# Patient Record
Sex: MALE | Race: BLACK OR AFRICAN AMERICAN | NOT HISPANIC OR LATINO | URBAN - METROPOLITAN AREA
[De-identification: names, ages, dates, MRNs, and addresses within clinical notes are randomized per-mention and may not be internally consistent; named-entity substitution may affect disease eponyms.]

---

## 2017-01-24 ENCOUNTER — INPATIENT (INPATIENT)
Facility: HOSPITAL | Age: 57
LOS: 2 days | Discharge: HOME | End: 2017-01-27
Attending: THORACIC SURGERY (CARDIOTHORACIC VASCULAR SURGERY) | Admitting: THORACIC SURGERY (CARDIOTHORACIC VASCULAR SURGERY)
Payer: MEDICAID

## 2017-01-24 VITALS
RESPIRATION RATE: 20 BRPM | HEIGHT: 65 IN | DIASTOLIC BLOOD PRESSURE: 84 MMHG | OXYGEN SATURATION: 99 % | HEART RATE: 100 BPM | SYSTOLIC BLOOD PRESSURE: 151 MMHG | TEMPERATURE: 98 F | WEIGHT: 187.83 LBS

## 2017-01-24 DIAGNOSIS — J39.8 OTHER SPECIFIED DISEASES OF UPPER RESPIRATORY TRACT: ICD-10-CM

## 2017-01-24 PROCEDURE — 99221 1ST HOSP IP/OBS SF/LOW 40: CPT

## 2017-01-24 RX ORDER — ATORVASTATIN CALCIUM 80 MG/1
80 TABLET, FILM COATED ORAL AT BEDTIME
Qty: 0 | Refills: 0 | Status: DISCONTINUED | OUTPATIENT
Start: 2017-01-24 | End: 2017-01-27

## 2017-01-24 RX ORDER — FAMOTIDINE 10 MG/ML
20 INJECTION INTRAVENOUS DAILY
Qty: 0 | Refills: 0 | Status: DISCONTINUED | OUTPATIENT
Start: 2017-01-24 | End: 2017-01-27

## 2017-01-24 RX ORDER — BUDESONIDE AND FORMOTEROL FUMARATE DIHYDRATE 160; 4.5 UG/1; UG/1
2 AEROSOL RESPIRATORY (INHALATION)
Qty: 0 | Refills: 0 | Status: DISCONTINUED | OUTPATIENT
Start: 2017-01-24 | End: 2017-01-27

## 2017-01-24 RX ORDER — GABAPENTIN 400 MG/1
300 CAPSULE ORAL EVERY 8 HOURS
Qty: 0 | Refills: 0 | Status: DISCONTINUED | OUTPATIENT
Start: 2017-01-24 | End: 2017-01-27

## 2017-01-24 RX ORDER — IPRATROPIUM/ALBUTEROL SULFATE 18-103MCG
3 AEROSOL WITH ADAPTER (GRAM) INHALATION EVERY 6 HOURS
Qty: 0 | Refills: 0 | Status: DISCONTINUED | OUTPATIENT
Start: 2017-01-24 | End: 2017-01-25

## 2017-01-24 RX ORDER — HEPARIN SODIUM 5000 [USP'U]/ML
5000 INJECTION INTRAVENOUS; SUBCUTANEOUS EVERY 8 HOURS
Qty: 0 | Refills: 0 | Status: DISCONTINUED | OUTPATIENT
Start: 2017-01-24 | End: 2017-01-27

## 2017-01-24 RX ORDER — QUETIAPINE FUMARATE 200 MG/1
200 TABLET, FILM COATED ORAL
Qty: 0 | Refills: 0 | Status: DISCONTINUED | OUTPATIENT
Start: 2017-01-24 | End: 2017-01-27

## 2017-01-24 RX ORDER — LOSARTAN POTASSIUM 100 MG/1
50 TABLET, FILM COATED ORAL DAILY
Qty: 0 | Refills: 0 | Status: DISCONTINUED | OUTPATIENT
Start: 2017-01-24 | End: 2017-01-27

## 2017-01-24 NOTE — H&P ADULT. - PSH
History of appendectomy    S/P diskectomy  5/7/12 anterior cervical diskectomy and fusion C 3 to C 6 with posterior laminectomy and fusion C 3 to C 6

## 2017-01-24 NOTE — H&P ADULT. - PMH
AA (alcohol abuse)    Detached retina, right  Right eye blind  DM (diabetes mellitus)    DM (diabetes mellitus), type 2    H/O: HTN (hypertension)    HLD (hyperlipidemia)    HTN (hypertension)    Tracheal stenosis AA (alcohol abuse)    Detached retina, right  Right eye blind  DM (diabetes mellitus)    DM (diabetes mellitus), type 2    H/O: HTN (hypertension)    HLD (hyperlipidemia)    HTN (hypertension)    Seizure  secondary to alcohol abuse  Tracheal stenosis

## 2017-01-24 NOTE — H&P ADULT. - HISTORY OF PRESENT ILLNESS
56 year old male transferred from [facility] with tracheal stenosis.  Patient presented to Emergency Room on 1/20/17 with complaints of shortness of breath and diaphoresis over the past three weeks.   Patient was admitted on 12/26 with similar complaints along with chest pain, discharged on 12/28 with inhaled and po steroids, neb treatments and po abx.  He has returned to the ED 6 times with continued sob, each time patient was using inhaled albuterol with poor symptom control.  Per patient, he was in a coma at [facility] for approximately 90 days until late November/ early December and he suddenly awoke.  Since then patient has had this new onset copd and chest tightness.  Patient quit tobacco use 1.5 weeks ago.  CT of neck showing thickening through the glottis, narrowing of the airway through the larynx.  Patient scoped by ENT 1/24 and found to have stenosis of trachea.  Patient seen by GI for rectal bleeding, possible hemorrhoids recommends miralax daily and outpatient follow up.

## 2017-01-24 NOTE — PATIENT PROFILE ADULT. - VISION (WITH CORRECTIVE LENSES IF THE PATIENT USUALLY WEARS THEM):
Normal vision: sees adequately in most situations; can see medication labels, newsprint/blind in rt eye

## 2017-01-25 DIAGNOSIS — Z98.890 OTHER SPECIFIED POSTPROCEDURAL STATES: Chronic | ICD-10-CM

## 2017-01-25 DIAGNOSIS — E11.9 TYPE 2 DIABETES MELLITUS WITHOUT COMPLICATIONS: ICD-10-CM

## 2017-01-25 DIAGNOSIS — J39.8 OTHER SPECIFIED DISEASES OF UPPER RESPIRATORY TRACT: ICD-10-CM

## 2017-01-25 LAB
APTT BLD: 25.5 SEC — LOW (ref 27.5–37.4)
BLD GP AB SCN SERPL QL: NEGATIVE — SIGNIFICANT CHANGE UP
BUN SERPL-MCNC: 22 MG/DL — SIGNIFICANT CHANGE UP (ref 7–23)
CALCIUM SERPL-MCNC: 9.7 MG/DL — SIGNIFICANT CHANGE UP (ref 8.4–10.5)
CHLORIDE SERPL-SCNC: 94 MMOL/L — LOW (ref 98–107)
CO2 SERPL-SCNC: 29 MMOL/L — SIGNIFICANT CHANGE UP (ref 22–31)
CREAT SERPL-MCNC: 0.94 MG/DL — SIGNIFICANT CHANGE UP (ref 0.5–1.3)
GLUCOSE SERPL-MCNC: 386 MG/DL — HIGH (ref 70–99)
HBA1C BLD-MCNC: 6.3 % — HIGH (ref 4–5.6)
HCT VFR BLD CALC: 40.2 % — SIGNIFICANT CHANGE UP (ref 39–50)
HGB BLD-MCNC: 13.1 G/DL — SIGNIFICANT CHANGE UP (ref 13–17)
INR BLD: 1.03 — SIGNIFICANT CHANGE UP (ref 0.87–1.18)
MCHC RBC-ENTMCNC: 29.6 PG — SIGNIFICANT CHANGE UP (ref 27–34)
MCHC RBC-ENTMCNC: 32.6 % — SIGNIFICANT CHANGE UP (ref 32–36)
MCV RBC AUTO: 91 FL — SIGNIFICANT CHANGE UP (ref 80–100)
PLATELET # BLD AUTO: 346 K/UL — SIGNIFICANT CHANGE UP (ref 150–400)
PMV BLD: 10.2 FL — SIGNIFICANT CHANGE UP (ref 7–13)
POTASSIUM SERPL-MCNC: 4.2 MMOL/L — SIGNIFICANT CHANGE UP (ref 3.5–5.3)
POTASSIUM SERPL-SCNC: 4.2 MMOL/L — SIGNIFICANT CHANGE UP (ref 3.5–5.3)
PROTHROM AB SERPL-ACNC: 11.8 SEC — SIGNIFICANT CHANGE UP (ref 10–13.1)
RBC # BLD: 4.42 M/UL — SIGNIFICANT CHANGE UP (ref 4.2–5.8)
RBC # FLD: 16.2 % — HIGH (ref 10.3–14.5)
RH IG SCN BLD-IMP: POSITIVE — SIGNIFICANT CHANGE UP
SODIUM SERPL-SCNC: 133 MMOL/L — LOW (ref 135–145)
WBC # BLD: 16.53 K/UL — HIGH (ref 3.8–10.5)
WBC # FLD AUTO: 16.53 K/UL — HIGH (ref 3.8–10.5)

## 2017-01-25 PROCEDURE — 70490 CT SOFT TISSUE NECK W/O DYE: CPT | Mod: 26

## 2017-01-25 PROCEDURE — 71010: CPT | Mod: 26

## 2017-01-25 PROCEDURE — 71010: CPT | Mod: 26,77

## 2017-01-25 PROCEDURE — 99223 1ST HOSP IP/OBS HIGH 75: CPT

## 2017-01-25 PROCEDURE — 71250 CT THORAX DX C-: CPT | Mod: 26

## 2017-01-25 PROCEDURE — 93306 TTE W/DOPPLER COMPLETE: CPT | Mod: 26

## 2017-01-25 RX ORDER — GLUCAGON INJECTION, SOLUTION 0.5 MG/.1ML
1 INJECTION, SOLUTION SUBCUTANEOUS ONCE
Qty: 0 | Refills: 0 | Status: DISCONTINUED | OUTPATIENT
Start: 2017-01-25 | End: 2017-01-27

## 2017-01-25 RX ORDER — DEXTROSE 50 % IN WATER 50 %
12.5 SYRINGE (ML) INTRAVENOUS ONCE
Qty: 0 | Refills: 0 | Status: DISCONTINUED | OUTPATIENT
Start: 2017-01-25 | End: 2017-01-27

## 2017-01-25 RX ORDER — INSULIN LISPRO 100/ML
VIAL (ML) SUBCUTANEOUS
Qty: 0 | Refills: 0 | Status: DISCONTINUED | OUTPATIENT
Start: 2017-01-25 | End: 2017-01-27

## 2017-01-25 RX ORDER — POLYETHYLENE GLYCOL 3350 17 G/17G
17 POWDER, FOR SOLUTION ORAL DAILY
Qty: 0 | Refills: 0 | Status: DISCONTINUED | OUTPATIENT
Start: 2017-01-25 | End: 2017-01-27

## 2017-01-25 RX ORDER — DEXTROSE 50 % IN WATER 50 %
1 SYRINGE (ML) INTRAVENOUS ONCE
Qty: 0 | Refills: 0 | Status: DISCONTINUED | OUTPATIENT
Start: 2017-01-25 | End: 2017-01-27

## 2017-01-25 RX ORDER — FOLIC ACID 0.8 MG
1 TABLET ORAL DAILY
Qty: 0 | Refills: 0 | Status: DISCONTINUED | OUTPATIENT
Start: 2017-01-25 | End: 2017-01-27

## 2017-01-25 RX ORDER — INSULIN LISPRO 100/ML
4 VIAL (ML) SUBCUTANEOUS
Qty: 0 | Refills: 0 | Status: DISCONTINUED | OUTPATIENT
Start: 2017-01-25 | End: 2017-01-27

## 2017-01-25 RX ORDER — INSULIN LISPRO 100/ML
VIAL (ML) SUBCUTANEOUS AT BEDTIME
Qty: 0 | Refills: 0 | Status: DISCONTINUED | OUTPATIENT
Start: 2017-01-25 | End: 2017-01-27

## 2017-01-25 RX ORDER — ACETAMINOPHEN 500 MG
650 TABLET ORAL EVERY 6 HOURS
Qty: 0 | Refills: 0 | Status: DISCONTINUED | OUTPATIENT
Start: 2017-01-25 | End: 2017-01-27

## 2017-01-25 RX ORDER — NIFEDIPINE 30 MG
60 TABLET, EXTENDED RELEASE 24 HR ORAL DAILY
Qty: 0 | Refills: 0 | Status: DISCONTINUED | OUTPATIENT
Start: 2017-01-25 | End: 2017-01-25

## 2017-01-25 RX ORDER — SODIUM CHLORIDE 9 MG/ML
1000 INJECTION, SOLUTION INTRAVENOUS
Qty: 0 | Refills: 0 | Status: DISCONTINUED | OUTPATIENT
Start: 2017-01-25 | End: 2017-01-27

## 2017-01-25 RX ORDER — DEXTROSE 50 % IN WATER 50 %
25 SYRINGE (ML) INTRAVENOUS ONCE
Qty: 0 | Refills: 0 | Status: DISCONTINUED | OUTPATIENT
Start: 2017-01-25 | End: 2017-01-27

## 2017-01-25 RX ORDER — SENNA PLUS 8.6 MG/1
2 TABLET ORAL AT BEDTIME
Qty: 0 | Refills: 0 | Status: DISCONTINUED | OUTPATIENT
Start: 2017-01-25 | End: 2017-01-27

## 2017-01-25 RX ORDER — DOCUSATE SODIUM 100 MG
100 CAPSULE ORAL
Qty: 0 | Refills: 0 | Status: DISCONTINUED | OUTPATIENT
Start: 2017-01-25 | End: 2017-01-27

## 2017-01-25 RX ORDER — INSULIN GLARGINE 100 [IU]/ML
16 INJECTION, SOLUTION SUBCUTANEOUS AT BEDTIME
Qty: 0 | Refills: 0 | Status: DISCONTINUED | OUTPATIENT
Start: 2017-01-25 | End: 2017-01-27

## 2017-01-25 RX ORDER — NIFEDIPINE 30 MG
60 TABLET, EXTENDED RELEASE 24 HR ORAL DAILY
Qty: 0 | Refills: 0 | Status: DISCONTINUED | OUTPATIENT
Start: 2017-01-25 | End: 2017-01-27

## 2017-01-25 RX ORDER — INSULIN LISPRO 100/ML
VIAL (ML) SUBCUTANEOUS
Qty: 0 | Refills: 0 | Status: DISCONTINUED | OUTPATIENT
Start: 2017-01-25 | End: 2017-01-25

## 2017-01-25 RX ORDER — AMLODIPINE BESYLATE 2.5 MG/1
5 TABLET ORAL DAILY
Qty: 0 | Refills: 0 | Status: DISCONTINUED | OUTPATIENT
Start: 2017-01-25 | End: 2017-01-25

## 2017-01-25 RX ORDER — THIAMINE MONONITRATE (VIT B1) 100 MG
100 TABLET ORAL DAILY
Qty: 0 | Refills: 0 | Status: DISCONTINUED | OUTPATIENT
Start: 2017-01-25 | End: 2017-01-27

## 2017-01-25 RX ADMIN — POLYETHYLENE GLYCOL 3350 17 GRAM(S): 17 POWDER, FOR SOLUTION ORAL at 11:22

## 2017-01-25 RX ADMIN — HEPARIN SODIUM 5000 UNIT(S): 5000 INJECTION INTRAVENOUS; SUBCUTANEOUS at 21:09

## 2017-01-25 RX ADMIN — Medication: at 12:47

## 2017-01-25 RX ADMIN — FAMOTIDINE 20 MILLIGRAM(S): 10 INJECTION INTRAVENOUS at 11:23

## 2017-01-25 RX ADMIN — BUDESONIDE AND FORMOTEROL FUMARATE DIHYDRATE 2 PUFF(S): 160; 4.5 AEROSOL RESPIRATORY (INHALATION) at 23:32

## 2017-01-25 RX ADMIN — Medication 650 MILLIGRAM(S): at 14:56

## 2017-01-25 RX ADMIN — Medication: at 18:00

## 2017-01-25 RX ADMIN — Medication 100 MILLIGRAM(S): at 17:00

## 2017-01-25 RX ADMIN — Medication 60 MILLIGRAM(S): at 01:16

## 2017-01-25 RX ADMIN — SENNA PLUS 2 TABLET(S): 8.6 TABLET ORAL at 21:09

## 2017-01-25 RX ADMIN — QUETIAPINE FUMARATE 200 MILLIGRAM(S): 200 TABLET, FILM COATED ORAL at 17:14

## 2017-01-25 RX ADMIN — Medication 60 MILLIGRAM(S): at 18:41

## 2017-01-25 RX ADMIN — Medication 100 MILLIGRAM(S): at 11:22

## 2017-01-25 RX ADMIN — Medication 4: at 23:33

## 2017-01-25 RX ADMIN — Medication 60 MILLIGRAM(S): at 21:09

## 2017-01-25 RX ADMIN — INSULIN GLARGINE 16 UNIT(S): 100 INJECTION, SOLUTION SUBCUTANEOUS at 23:33

## 2017-01-25 RX ADMIN — BUDESONIDE AND FORMOTEROL FUMARATE DIHYDRATE 2 PUFF(S): 160; 4.5 AEROSOL RESPIRATORY (INHALATION) at 09:48

## 2017-01-25 RX ADMIN — Medication 100 MILLIGRAM(S): at 06:06

## 2017-01-25 RX ADMIN — Medication: at 08:55

## 2017-01-25 RX ADMIN — HEPARIN SODIUM 5000 UNIT(S): 5000 INJECTION INTRAVENOUS; SUBCUTANEOUS at 06:06

## 2017-01-25 RX ADMIN — GABAPENTIN 300 MILLIGRAM(S): 400 CAPSULE ORAL at 21:10

## 2017-01-25 RX ADMIN — AMLODIPINE BESYLATE 5 MILLIGRAM(S): 2.5 TABLET ORAL at 11:22

## 2017-01-25 RX ADMIN — GABAPENTIN 300 MILLIGRAM(S): 400 CAPSULE ORAL at 06:06

## 2017-01-25 RX ADMIN — QUETIAPINE FUMARATE 200 MILLIGRAM(S): 200 TABLET, FILM COATED ORAL at 06:06

## 2017-01-25 RX ADMIN — Medication 1 MILLIGRAM(S): at 11:22

## 2017-01-25 RX ADMIN — GABAPENTIN 300 MILLIGRAM(S): 400 CAPSULE ORAL at 14:49

## 2017-01-25 RX ADMIN — HEPARIN SODIUM 5000 UNIT(S): 5000 INJECTION INTRAVENOUS; SUBCUTANEOUS at 14:49

## 2017-01-25 RX ADMIN — Medication 3 MILLILITER(S): at 04:59

## 2017-01-25 RX ADMIN — ATORVASTATIN CALCIUM 80 MILLIGRAM(S): 80 TABLET, FILM COATED ORAL at 21:09

## 2017-01-25 RX ADMIN — Medication 4 UNIT(S): at 18:00

## 2017-01-25 RX ADMIN — Medication 650 MILLIGRAM(S): at 15:30

## 2017-01-25 RX ADMIN — Medication 60 MILLIGRAM(S): at 14:48

## 2017-01-25 RX ADMIN — LOSARTAN POTASSIUM 50 MILLIGRAM(S): 100 TABLET, FILM COATED ORAL at 06:06

## 2017-01-26 ENCOUNTER — APPOINTMENT (OUTPATIENT)
Dept: THORACIC SURGERY | Facility: HOSPITAL | Age: 57
End: 2017-01-26

## 2017-01-26 LAB
CHOLEST SERPL-MCNC: 160 MG/DL — SIGNIFICANT CHANGE UP (ref 120–199)
HBA1C BLD-MCNC: 6.6 % — HIGH (ref 4–5.6)
HDLC SERPL-MCNC: 73 MG/DL — HIGH (ref 35–55)
HIV1 AG SER QL: SIGNIFICANT CHANGE UP
HIV1+2 AB SPEC QL: SIGNIFICANT CHANGE UP
LIPID PNL WITH DIRECT LDL SERPL: 77 MG/DL — SIGNIFICANT CHANGE UP
RH IG SCN BLD-IMP: POSITIVE — SIGNIFICANT CHANGE UP
TRIGL SERPL-MCNC: 75 MG/DL — SIGNIFICANT CHANGE UP (ref 10–149)

## 2017-01-26 PROCEDURE — 99232 SBSQ HOSP IP/OBS MODERATE 35: CPT

## 2017-01-26 PROCEDURE — 71010: CPT | Mod: 26

## 2017-01-26 RX ORDER — ONDANSETRON 8 MG/1
4 TABLET, FILM COATED ORAL ONCE
Qty: 0 | Refills: 0 | Status: DISCONTINUED | OUTPATIENT
Start: 2017-01-26 | End: 2017-01-26

## 2017-01-26 RX ORDER — SODIUM CHLORIDE 9 MG/ML
1000 INJECTION, SOLUTION INTRAVENOUS
Qty: 0 | Refills: 0 | Status: DISCONTINUED | OUTPATIENT
Start: 2017-01-26 | End: 2017-01-26

## 2017-01-26 RX ORDER — FENTANYL CITRATE 50 UG/ML
25 INJECTION INTRAVENOUS
Qty: 0 | Refills: 0 | Status: DISCONTINUED | OUTPATIENT
Start: 2017-01-26 | End: 2017-01-26

## 2017-01-26 RX ORDER — BENZOCAINE AND MENTHOL 5; 1 G/100ML; G/100ML
1 LIQUID ORAL
Qty: 0 | Refills: 0 | Status: DISCONTINUED | OUTPATIENT
Start: 2017-01-26 | End: 2017-01-27

## 2017-01-26 RX ADMIN — FAMOTIDINE 20 MILLIGRAM(S): 10 INJECTION INTRAVENOUS at 18:31

## 2017-01-26 RX ADMIN — Medication 60 MILLIGRAM(S): at 21:06

## 2017-01-26 RX ADMIN — GABAPENTIN 300 MILLIGRAM(S): 400 CAPSULE ORAL at 05:43

## 2017-01-26 RX ADMIN — Medication 100 MILLIGRAM(S): at 05:43

## 2017-01-26 RX ADMIN — QUETIAPINE FUMARATE 200 MILLIGRAM(S): 200 TABLET, FILM COATED ORAL at 05:43

## 2017-01-26 RX ADMIN — HEPARIN SODIUM 5000 UNIT(S): 5000 INJECTION INTRAVENOUS; SUBCUTANEOUS at 05:43

## 2017-01-26 RX ADMIN — Medication 6: at 08:10

## 2017-01-26 RX ADMIN — Medication 60 MILLIGRAM(S): at 05:43

## 2017-01-26 RX ADMIN — SENNA PLUS 2 TABLET(S): 8.6 TABLET ORAL at 21:06

## 2017-01-26 RX ADMIN — INSULIN GLARGINE 16 UNIT(S): 100 INJECTION, SOLUTION SUBCUTANEOUS at 21:16

## 2017-01-26 RX ADMIN — HEPARIN SODIUM 5000 UNIT(S): 5000 INJECTION INTRAVENOUS; SUBCUTANEOUS at 21:06

## 2017-01-26 RX ADMIN — SODIUM CHLORIDE 50 MILLILITER(S): 9 INJECTION, SOLUTION INTRAVENOUS at 13:15

## 2017-01-26 RX ADMIN — Medication 60 MILLIGRAM(S): at 15:00

## 2017-01-26 RX ADMIN — BUDESONIDE AND FORMOTEROL FUMARATE DIHYDRATE 2 PUFF(S): 160; 4.5 AEROSOL RESPIRATORY (INHALATION) at 08:10

## 2017-01-26 RX ADMIN — ATORVASTATIN CALCIUM 80 MILLIGRAM(S): 80 TABLET, FILM COATED ORAL at 21:06

## 2017-01-26 RX ADMIN — LOSARTAN POTASSIUM 50 MILLIGRAM(S): 100 TABLET, FILM COATED ORAL at 05:43

## 2017-01-26 RX ADMIN — QUETIAPINE FUMARATE 200 MILLIGRAM(S): 200 TABLET, FILM COATED ORAL at 21:06

## 2017-01-26 RX ADMIN — Medication 1 MILLIGRAM(S): at 18:31

## 2017-01-26 RX ADMIN — Medication 100 MILLIGRAM(S): at 18:31

## 2017-01-26 RX ADMIN — Medication 4 UNIT(S): at 16:30

## 2017-01-26 RX ADMIN — HEPARIN SODIUM 5000 UNIT(S): 5000 INJECTION INTRAVENOUS; SUBCUTANEOUS at 16:08

## 2017-01-26 RX ADMIN — GABAPENTIN 300 MILLIGRAM(S): 400 CAPSULE ORAL at 16:08

## 2017-01-26 RX ADMIN — BENZOCAINE AND MENTHOL 1 LOZENGE: 5; 1 LIQUID ORAL at 21:15

## 2017-01-26 RX ADMIN — BUDESONIDE AND FORMOTEROL FUMARATE DIHYDRATE 2 PUFF(S): 160; 4.5 AEROSOL RESPIRATORY (INHALATION) at 21:06

## 2017-01-26 NOTE — BRIEF OPERATIVE NOTE - PROCEDURE
Bronchoscopy  01/26/2017  Flexible bronchoscopy, BAL, Laser fulgeration, Electrofulgeration and cryotherapy of tracheal stenosis  Active  CSUMMERS

## 2017-01-27 ENCOUNTER — TRANSCRIPTION ENCOUNTER (OUTPATIENT)
Age: 57
End: 2017-01-27

## 2017-01-27 VITALS — WEIGHT: 149.47 LBS

## 2017-01-27 PROCEDURE — 71010: CPT | Mod: 26

## 2017-01-27 PROCEDURE — 99232 SBSQ HOSP IP/OBS MODERATE 35: CPT

## 2017-01-27 RX ORDER — GABAPENTIN 400 MG/1
1 CAPSULE ORAL
Qty: 42 | Refills: 0 | OUTPATIENT
Start: 2017-01-27 | End: 2017-02-10

## 2017-01-27 RX ORDER — NIFEDIPINE 30 MG
1 TABLET, EXTENDED RELEASE 24 HR ORAL
Qty: 30 | Refills: 0 | OUTPATIENT
Start: 2017-01-27 | End: 2017-02-26

## 2017-01-27 RX ORDER — ATORVASTATIN CALCIUM 80 MG/1
1 TABLET, FILM COATED ORAL
Qty: 30 | Refills: 0 | OUTPATIENT
Start: 2017-01-27 | End: 2017-02-26

## 2017-01-27 RX ORDER — LOSARTAN POTASSIUM 100 MG/1
1 TABLET, FILM COATED ORAL
Qty: 0 | Refills: 0 | COMMUNITY
Start: 2017-01-27

## 2017-01-27 RX ORDER — QUETIAPINE FUMARATE 200 MG/1
1 TABLET, FILM COATED ORAL
Qty: 60 | Refills: 0 | OUTPATIENT
Start: 2017-01-27 | End: 2017-02-26

## 2017-01-27 RX ORDER — ACETAMINOPHEN 500 MG
2 TABLET ORAL
Qty: 0 | Refills: 0 | COMMUNITY
Start: 2017-01-27

## 2017-01-27 RX ORDER — METFORMIN HYDROCHLORIDE 850 MG/1
1 TABLET ORAL
Qty: 60 | Refills: 0 | OUTPATIENT
Start: 2017-01-27 | End: 2017-02-26

## 2017-01-27 RX ORDER — ASPIRIN/CALCIUM CARB/MAGNESIUM 324 MG
1 TABLET ORAL
Qty: 30 | Refills: 0 | OUTPATIENT
Start: 2017-01-27 | End: 2017-02-26

## 2017-01-27 RX ORDER — THIAMINE MONONITRATE (VIT B1) 100 MG
1 TABLET ORAL
Qty: 30 | Refills: 0 | OUTPATIENT
Start: 2017-01-27

## 2017-01-27 RX ORDER — IPRATROPIUM/ALBUTEROL SULFATE 18-103MCG
3 AEROSOL WITH ADAPTER (GRAM) INHALATION
Qty: 0 | Refills: 0 | COMMUNITY

## 2017-01-27 RX ORDER — IPRATROPIUM/ALBUTEROL SULFATE 18-103MCG
3 AEROSOL WITH ADAPTER (GRAM) INHALATION
Qty: 30 | Refills: 0 | OUTPATIENT
Start: 2017-01-27 | End: 2017-02-26

## 2017-01-27 RX ORDER — BUDESONIDE AND FORMOTEROL FUMARATE DIHYDRATE 160; 4.5 UG/1; UG/1
2 AEROSOL RESPIRATORY (INHALATION)
Qty: 30 | Refills: 0 | OUTPATIENT
Start: 2017-01-27 | End: 2017-02-26

## 2017-01-27 RX ORDER — TUBERCULIN PURIFIED PROTEIN DERIVATIVE 5 [IU]/.1ML
5 INJECTION, SOLUTION INTRADERMAL ONCE
Qty: 0 | Refills: 0 | Status: COMPLETED | OUTPATIENT
Start: 2017-01-27 | End: 2017-01-27

## 2017-01-27 RX ORDER — FOLIC ACID 0.8 MG
1 TABLET ORAL
Qty: 30 | Refills: 0 | OUTPATIENT
Start: 2017-01-27 | End: 2017-02-26

## 2017-01-27 RX ORDER — OXYCODONE HYDROCHLORIDE 5 MG/1
1 TABLET ORAL
Qty: 20 | Refills: 0 | OUTPATIENT
Start: 2017-01-27 | End: 2017-02-01

## 2017-01-27 RX ORDER — BUDESONIDE AND FORMOTEROL FUMARATE DIHYDRATE 160; 4.5 UG/1; UG/1
2 AEROSOL RESPIRATORY (INHALATION)
Qty: 0 | Refills: 0 | COMMUNITY

## 2017-01-27 RX ORDER — MOXIFLOXACIN HYDROCHLORIDE TABLETS, 400 MG 400 MG/1
250 TABLET, FILM COATED ORAL
Qty: 0 | Refills: 0 | COMMUNITY

## 2017-01-27 RX ADMIN — Medication 8: at 12:01

## 2017-01-27 RX ADMIN — LOSARTAN POTASSIUM 50 MILLIGRAM(S): 100 TABLET, FILM COATED ORAL at 05:05

## 2017-01-27 RX ADMIN — Medication 100 MILLIGRAM(S): at 12:15

## 2017-01-27 RX ADMIN — Medication 4 UNIT(S): at 08:41

## 2017-01-27 RX ADMIN — Medication 60 MILLIGRAM(S): at 05:05

## 2017-01-27 RX ADMIN — GABAPENTIN 300 MILLIGRAM(S): 400 CAPSULE ORAL at 05:04

## 2017-01-27 RX ADMIN — BUDESONIDE AND FORMOTEROL FUMARATE DIHYDRATE 2 PUFF(S): 160; 4.5 AEROSOL RESPIRATORY (INHALATION) at 09:36

## 2017-01-27 RX ADMIN — HEPARIN SODIUM 5000 UNIT(S): 5000 INJECTION INTRAVENOUS; SUBCUTANEOUS at 05:04

## 2017-01-27 RX ADMIN — GABAPENTIN 300 MILLIGRAM(S): 400 CAPSULE ORAL at 14:30

## 2017-01-27 RX ADMIN — Medication 4 UNIT(S): at 12:01

## 2017-01-27 RX ADMIN — Medication 100 MILLIGRAM(S): at 05:04

## 2017-01-27 RX ADMIN — POLYETHYLENE GLYCOL 3350 17 GRAM(S): 17 POWDER, FOR SOLUTION ORAL at 12:16

## 2017-01-27 RX ADMIN — HEPARIN SODIUM 5000 UNIT(S): 5000 INJECTION INTRAVENOUS; SUBCUTANEOUS at 14:31

## 2017-01-27 RX ADMIN — QUETIAPINE FUMARATE 200 MILLIGRAM(S): 200 TABLET, FILM COATED ORAL at 05:05

## 2017-01-27 RX ADMIN — Medication 1 MILLIGRAM(S): at 12:14

## 2017-01-27 RX ADMIN — BENZOCAINE AND MENTHOL 1 LOZENGE: 5; 1 LIQUID ORAL at 09:54

## 2017-01-27 RX ADMIN — Medication: at 08:41

## 2017-01-27 RX ADMIN — Medication 60 MILLIGRAM(S): at 14:02

## 2017-01-27 RX ADMIN — FAMOTIDINE 20 MILLIGRAM(S): 10 INJECTION INTRAVENOUS at 12:14

## 2017-01-27 RX ADMIN — TUBERCULIN PURIFIED PROTEIN DERIVATIVE 5 UNIT(S): 5 INJECTION, SOLUTION INTRADERMAL at 13:00

## 2017-01-27 RX ADMIN — BENZOCAINE AND MENTHOL 1 LOZENGE: 5; 1 LIQUID ORAL at 04:21

## 2017-01-27 NOTE — DIETITIAN INITIAL EVALUATION ADULT. - NS AS NUTRI INTERV ED CONTENT
Purpose of the nutrition education/Recommended modifications/Nutrition relationship to health/disease

## 2017-01-27 NOTE — DISCHARGE NOTE ADULT - INSTRUCTIONS
no new restrictions Take all medications as prescribed, keep all follow up appointments. If symptoms persist seek medical attention.

## 2017-01-27 NOTE — DISCHARGE NOTE ADULT - CARE PROVIDER_API CALL
Ashu Blackman), Surgery; Thoracic Surgery  [address] [place] [address]  Phone: [phone]  Fax: [phone]

## 2017-01-27 NOTE — DIETITIAN INITIAL EVALUATION ADULT. - PERTINENT MEDS FT
oxycodone, ducolax, colace, folic acid, lantus, humalog, miralax, senna, thiamine, Lipitor, seroquel

## 2017-01-27 NOTE — DIETITIAN INITIAL EVALUATION ADULT. - OTHER INFO
Nutrition consult received for presenting , HbA1c, DM2 on steriods, needs edu. Pt currently on CSTCHO (no snack) with PO supplement Glucerna Shake x3. Endorses good appetite and PO intake 100%. Patient denies any nausea/vomiting/diarrhea/constipation or difficulty chewing and swallowing. NKFA. RD provided the patient with extensive verbal and written DM diet education; including, carb counting, label reading, meal planning, pre-prandial and post-prandial finger stick goals, and HbA1c goal. Pt receptive to information provided; made aware RDN remains available.

## 2017-01-27 NOTE — DIETITIAN INITIAL EVALUATION ADULT. - NS AS NUTRI INTERV MEALS SNACK
1. Continue current diet order, which remains appropriate at this time. 2. Monitor weights, labs, BM's, skin integrity, p.o. intake./Other (specify)/Composition of meals/snacks/Carbohydrate - modified diet

## 2017-01-27 NOTE — DISCHARGE NOTE ADULT - CARE PLAN
Principal Discharge DX:	Tracheal stenosis  Goal:	s/p flex bronch, electofulgaration, cryotherapy  Instructions for follow-up, activity and diet:	as instructed

## 2017-01-27 NOTE — DIETITIAN INITIAL EVALUATION ADULT. - NS AS NUTRI INTERV COLLABORAT
Suggest outpt follow up with RD for purpose of long term nutrition evaluation/Collaboration with other nutrition professionals

## 2017-01-27 NOTE — DISCHARGE NOTE ADULT - ADDITIONAL INSTRUCTIONS
continue steroid taper as instructed  See your PCP on Monday 1/30 to have PPD (TB) test checked continue steroid taper as instructed  See your PCP on Monday 1/30 to have PPD (TB) test checked  Call thoracic surgery office at  to make appointment for surveillance bronchoscopy in 4-6 weeks; We will see you in the office and schedule the tracheal resection procedure for about 3 months from now.

## 2017-01-27 NOTE — DISCHARGE NOTE ADULT - MEDICATION SUMMARY - MEDICATIONS TO TAKE
I will START or STAY ON the medications listed below when I get home from the hospital:    predniSONE 20 mg oral tablet  -- 1 tab(s) by mouth once a day as instructed    1/28 40mg  1/29 40mg  1/30 40mg    1/31 30mg  2/1 30mg  2/2 30mg    2/3 20mg  2/4 20mg  2/5 20mg    2/6 10mg  2/7 10mg  2/8 10mg    -- It is very important that you take or use this exactly as directed.  Do not skip doses or discontinue unless directed by your doctor.  Obtain medical advice before taking any non-prescription drugs as some may affect the action of this medication.  Take with food or milk.    -- Indication: For prednisone taper    Aspir 81 81 mg oral delayed release tablet  -- 1 tab(s) by mouth once a day  -- Swallow whole.  Do not crush.  Take with food or milk.    -- Indication: For vessel protection    acetaminophen 325 mg oral tablet  -- 2 tab(s) by mouth every 6 hours, As needed, Mild Pain (1 - 3)  -- Indication: For pain control    losartan 50 mg oral tablet  -- 1 tab(s) by mouth once a day  -- Indication: For hypertension    gabapentin 300 mg oral capsule  -- 1 cap(s) by mouth every 8 hours, As Needed -for moderate pain MDD:3  -- Indication: For pain control    metFORMIN 1000 mg oral tablet  -- 1 tab(s) by mouth 2 times a day  -- Check with your doctor before becoming pregnant.  Do not drink alcoholic beverages when taking this medication.  It is very important that you take or use this exactly as directed.  Do not skip doses or discontinue unless directed by your doctor.  Obtain medical advice before taking any non-prescription drugs as some may affect the action of this medication.  Take with food or milk.    -- Indication: For Diabetes    atorvastatin 80 mg oral tablet  -- 1 tab(s) by mouth once a day (at bedtime)  -- Indication: For hyperlipidemia    QUEtiapine 200 mg oral tablet  -- 1 tab(s) by mouth 2 times a day  -- Indication: For home med    DuoNeb 0.5 mg-2.5 mg/3 mL inhalation solution  -- 3 milliliter(s) inhaled every 4 hours, As Needed -for shortness of breath and/or wheezing  -- Indication: For Opens airways    Symbicort 160 mcg-4.5 mcg/inh inhalation aerosol  -- 2 puff(s) inhaled 2 times a day  -- Indication: For Opens airways    NIFEdipine 60 mg oral tablet, extended release  -- 1 tab(s) by mouth once a day  -- Indication: For cardiac med    folic acid 1 mg oral tablet  -- 1 tab(s) by mouth once a day  -- Indication: For Supplement    thiamine 100 mg oral tablet  -- 1 tab(s) by mouth once a day  -- Indication: For Supplement I will START or STAY ON the medications listed below when I get home from the hospital:    predniSONE 20 mg oral tablet  -- 1 tab(s) by mouth once a day as instructed    1/28 40mg  1/29 40mg  1/30 40mg    1/31 30mg  2/1 30mg  2/2 30mg    2/3 20mg  2/4 20mg  2/5 20mg    2/6 10mg  2/7 10mg  2/8 10mg    -- It is very important that you take or use this exactly as directed.  Do not skip doses or discontinue unless directed by your doctor.  Obtain medical advice before taking any non-prescription drugs as some may affect the action of this medication.  Take with food or milk.    -- Indication: For prednisone taper    Aspir 81 81 mg oral delayed release tablet  -- 1 tab(s) by mouth once a day  -- Swallow whole.  Do not crush.  Take with food or milk.    -- Indication: For vessel protection    acetaminophen 325 mg oral tablet  -- 2 tab(s) by mouth every 6 hours, As needed, Mild Pain (1 - 3)  -- Indication: For pain control    losartan 50 mg oral tablet  -- 1 tab(s) by mouth once a day  -- Indication: For hypertension    gabapentin 300 mg oral capsule  -- 1 cap(s) by mouth every 8 hours, As Needed -for moderate pain MDD:3  -- Indication: For pain control    metFORMIN 1000 mg oral tablet  -- 1 tab(s) by mouth 2 times a day  -- Check with your doctor before becoming pregnant.  Do not drink alcoholic beverages when taking this medication.  It is very important that you take or use this exactly as directed.  Do not skip doses or discontinue unless directed by your doctor.  Obtain medical advice before taking any non-prescription drugs as some may affect the action of this medication.  Take with food or milk.    -- Indication: For Diabetes    atorvastatin 80 mg oral tablet  -- 1 tab(s) by mouth once a day (at bedtime)  -- Indication: For hyperlipidemia    QUEtiapine 200 mg oral tablet  -- 1 tab(s) by mouth 2 times a day  -- Indication: For home med    Symbicort 160 mcg-4.5 mcg/inh inhalation aerosol  -- 2 puff(s) inhaled 2 times a day  -- Indication: For Opens airways    DuoNeb 0.5 mg-2.5 mg/3 mL inhalation solution  -- 3 milliliter(s) inhaled every 4 hours, As Needed -for shortness of breath and/or wheezing  -- Indication: For Opens airways    NIFEdipine 60 mg oral tablet, extended release  -- 1 tab(s) by mouth once a day  -- Indication: For cardiac med    folic acid 1 mg oral tablet  -- 1 tab(s) by mouth once a day  -- Indication: For Supplement    thiamine 100 mg oral tablet  -- 1 tab(s) by mouth once a day  -- Indication: For Supplement

## 2017-01-27 NOTE — DISCHARGE NOTE ADULT - HOSPITAL COURSE
56 year old male transferred from [facility] with tracheal stenosis.  Patient presented to Emergency Room on 1/20/17 with complaints of shortness of breath and diaphoresis over the past three weeks.   Patient was admitted on 12/26 with similar complaints along with chest pain, discharged on 12/28 with inhaled and po steroids, neb treatments and po abx.  He has returned to the ED 6 times with continued sob, each time patient was using inhaled albuterol with poor symptom control.  Per patient, he was in a coma at [facility] for approximately 90 days until late November/ early December and he suddenly awoke.  Since then patient has had this new onset copd and chest tightness.  Patient quit tobacco use 1.5 weeks ago.  CT of neck showing thickening through the glottis, narrowing of the airway through the larynx.  Patient scoped by ENT 1/24 and found to have stenosis of trachea.  Patient seen by GI for rectal bleeding, possible hemorrhoids recommends miralax daily and outpatient follow up.        Pt s/p flex bronch, electro fulgeration and cryotherapy of tracheal stenosis 1/26/17.  Postop course routine. Pt started steroid taper.  He is stable for discharge home to follow up as an outpatient. 56 year old male transferred from [facility] with tracheal stenosis.  Patient presented to Emergency Room on 1/20/17 with complaints of shortness of breath and diaphoresis over the past three weeks.   Patient was admitted on 12/26 with similar complaints along with chest pain, discharged on 12/28 with inhaled and po steroids, neb treatments and po abx.  He has returned to the ED 6 times with continued sob, each time patient was using inhaled albuterol with poor symptom control.  Per patient, he was in a coma at [facility] for approximately 90 days until late November/ early December and he suddenly awoke.  Since then patient has had this new onset copd and chest tightness.  Patient quit tobacco use 1.5 weeks ago.  CT of neck showing thickening through the glottis, narrowing of the airway through the larynx.  Patient scoped by ENT 1/24 and found to have stenosis of trachea.  Patient seen by GI for rectal bleeding, possible hemorrhoids recommends miralax daily and outpatient follow up.        Pt s/p flex bronch, electro fulgeration and cryotherapy of tracheal stenosis 1/26/17.  Postop course routine. Pt started steroid taper.  He is stable for discharge home to follow up as an outpatient. Surveillance bronch in 4-6 weeks; return for tracheal resection in about 3 months from now - must be off steroids for 3 months prior to procedure

## 2017-01-27 NOTE — DISCHARGE NOTE ADULT - PATIENT PORTAL LINK FT
“You can access the FollowHealth Patient Portal, offered by [facility], by registering with the following website: [email or web address]”

## 2017-01-31 PROBLEM — H33.21 SEROUS RETINAL DETACHMENT, RIGHT EYE: Chronic | Status: ACTIVE | Noted: 2017-01-25

## 2017-01-31 PROBLEM — J39.8 OTHER SPECIFIED DISEASES OF UPPER RESPIRATORY TRACT: Chronic | Status: ACTIVE | Noted: 2017-01-25

## 2017-01-31 PROBLEM — R56.9 UNSPECIFIED CONVULSIONS: Chronic | Status: ACTIVE | Noted: 2017-01-25

## 2017-02-21 ENCOUNTER — INPATIENT (INPATIENT)
Facility: HOSPITAL | Age: 57
LOS: 5 days | Discharge: HOME | End: 2017-02-27
Attending: THORACIC SURGERY (CARDIOTHORACIC VASCULAR SURGERY) | Admitting: THORACIC SURGERY (CARDIOTHORACIC VASCULAR SURGERY)
Payer: MEDICAID

## 2017-02-21 VITALS
TEMPERATURE: 98 F | OXYGEN SATURATION: 99 % | DIASTOLIC BLOOD PRESSURE: 99 MMHG | SYSTOLIC BLOOD PRESSURE: 159 MMHG | RESPIRATION RATE: 20 BRPM | HEART RATE: 103 BPM

## 2017-02-21 DIAGNOSIS — Z98.890 OTHER SPECIFIED POSTPROCEDURAL STATES: Chronic | ICD-10-CM

## 2017-02-21 DIAGNOSIS — J39.8 OTHER SPECIFIED DISEASES OF UPPER RESPIRATORY TRACT: ICD-10-CM

## 2017-02-21 LAB
ALBUMIN SERPL ELPH-MCNC: 4 G/DL — SIGNIFICANT CHANGE UP (ref 3.3–5)
ALP SERPL-CCNC: 98 U/L — SIGNIFICANT CHANGE UP (ref 40–120)
ALT FLD-CCNC: 18 U/L — SIGNIFICANT CHANGE UP (ref 4–41)
ANISOCYTOSIS BLD QL: SLIGHT — SIGNIFICANT CHANGE UP
AST SERPL-CCNC: 33 U/L — SIGNIFICANT CHANGE UP (ref 4–40)
BASE EXCESS BLDV CALC-SCNC: 4.5 MMOL/L — SIGNIFICANT CHANGE UP
BASOPHILS # BLD AUTO: 0.03 K/UL — SIGNIFICANT CHANGE UP (ref 0–0.2)
BASOPHILS NFR BLD AUTO: 0.5 % — SIGNIFICANT CHANGE UP (ref 0–2)
BILIRUB SERPL-MCNC: 0.7 MG/DL — SIGNIFICANT CHANGE UP (ref 0.2–1.2)
BLOOD GAS VENOUS - CREATININE: 0.64 MG/DL — SIGNIFICANT CHANGE UP (ref 0.5–1.3)
BUN SERPL-MCNC: 10 MG/DL — SIGNIFICANT CHANGE UP (ref 7–23)
CALCIUM SERPL-MCNC: 9.9 MG/DL — SIGNIFICANT CHANGE UP (ref 8.4–10.5)
CHLORIDE BLDV-SCNC: 103 MMOL/L — SIGNIFICANT CHANGE UP (ref 96–108)
CHLORIDE SERPL-SCNC: 95 MMOL/L — LOW (ref 98–107)
CO2 SERPL-SCNC: 25 MMOL/L — SIGNIFICANT CHANGE UP (ref 22–31)
CREAT SERPL-MCNC: 0.74 MG/DL — SIGNIFICANT CHANGE UP (ref 0.5–1.3)
EOSINOPHIL # BLD AUTO: 0.04 K/UL — SIGNIFICANT CHANGE UP (ref 0–0.5)
EOSINOPHIL NFR BLD AUTO: 0.6 % — SIGNIFICANT CHANGE UP (ref 0–6)
GAS PNL BLDV: 131 MMOL/L — LOW (ref 136–146)
GIANT PLATELETS BLD QL SMEAR: PRESENT — SIGNIFICANT CHANGE UP
GLUCOSE BLDV-MCNC: 103 — HIGH (ref 70–99)
GLUCOSE SERPL-MCNC: 105 MG/DL — HIGH (ref 70–99)
HCO3 BLDV-SCNC: 28 MMOL/L — HIGH (ref 20–27)
HCT VFR BLD CALC: 39.5 % — SIGNIFICANT CHANGE UP (ref 39–50)
HCT VFR BLDV CALC: 41.2 % — SIGNIFICANT CHANGE UP (ref 39–51)
HGB BLD-MCNC: 13.1 G/DL — SIGNIFICANT CHANGE UP (ref 13–17)
HGB BLDV-MCNC: 13.4 G/DL — SIGNIFICANT CHANGE UP (ref 13–17)
IMM GRANULOCYTES NFR BLD AUTO: 0.2 % — SIGNIFICANT CHANGE UP (ref 0–1.5)
LACTATE BLDV-MCNC: 1.3 MMOL/L — SIGNIFICANT CHANGE UP (ref 0.5–2)
LG PLATELETS BLD QL AUTO: SLIGHT — SIGNIFICANT CHANGE UP
LYMPHOCYTES # BLD AUTO: 2.03 K/UL — SIGNIFICANT CHANGE UP (ref 1–3.3)
LYMPHOCYTES # BLD AUTO: 31.7 % — SIGNIFICANT CHANGE UP (ref 13–44)
MANUAL SMEAR VERIFICATION: SIGNIFICANT CHANGE UP
MCHC RBC-ENTMCNC: 30.1 PG — SIGNIFICANT CHANGE UP (ref 27–34)
MCHC RBC-ENTMCNC: 33.2 % — SIGNIFICANT CHANGE UP (ref 32–36)
MCV RBC AUTO: 90.8 FL — SIGNIFICANT CHANGE UP (ref 80–100)
MONOCYTES # BLD AUTO: 0.45 K/UL — SIGNIFICANT CHANGE UP (ref 0–0.9)
MONOCYTES NFR BLD AUTO: 7 % — SIGNIFICANT CHANGE UP (ref 2–14)
NEUTROPHILS # BLD AUTO: 3.84 K/UL — SIGNIFICANT CHANGE UP (ref 1.8–7.4)
NEUTROPHILS NFR BLD AUTO: 60 % — SIGNIFICANT CHANGE UP (ref 43–77)
PCO2 BLDV: 46 MMHG — SIGNIFICANT CHANGE UP (ref 41–51)
PH BLDV: 7.41 PH — SIGNIFICANT CHANGE UP (ref 7.32–7.43)
PLATELET # BLD AUTO: 437 K/UL — HIGH (ref 150–400)
PLATELET COUNT - ESTIMATE: NORMAL — SIGNIFICANT CHANGE UP
PMV BLD: 9.9 FL — SIGNIFICANT CHANGE UP (ref 7–13)
PO2 BLDV: 54 MMHG — HIGH (ref 35–40)
POTASSIUM BLDV-SCNC: 4.5 MMOL/L — SIGNIFICANT CHANGE UP (ref 3.4–4.5)
POTASSIUM SERPL-MCNC: 4.9 MMOL/L — SIGNIFICANT CHANGE UP (ref 3.5–5.3)
POTASSIUM SERPL-SCNC: 4.9 MMOL/L — SIGNIFICANT CHANGE UP (ref 3.5–5.3)
PROT SERPL-MCNC: 7.9 G/DL — SIGNIFICANT CHANGE UP (ref 6–8.3)
RBC # BLD: 4.35 M/UL — SIGNIFICANT CHANGE UP (ref 4.2–5.8)
RBC # FLD: 15.2 % — HIGH (ref 10.3–14.5)
SAO2 % BLDV: 86.1 % — HIGH (ref 60–85)
SODIUM SERPL-SCNC: 137 MMOL/L — SIGNIFICANT CHANGE UP (ref 135–145)
WBC # BLD: 6.4 K/UL — SIGNIFICANT CHANGE UP (ref 3.8–10.5)
WBC # FLD AUTO: 6.4 K/UL — SIGNIFICANT CHANGE UP (ref 3.8–10.5)

## 2017-02-21 PROCEDURE — 31622 DX BRONCHOSCOPE/WASH: CPT | Mod: GC

## 2017-02-21 PROCEDURE — 71020: CPT | Mod: 26

## 2017-02-21 PROCEDURE — 99222 1ST HOSP IP/OBS MODERATE 55: CPT

## 2017-02-21 RX ORDER — HEPARIN SODIUM 5000 [USP'U]/ML
5000 INJECTION INTRAVENOUS; SUBCUTANEOUS EVERY 12 HOURS
Qty: 0 | Refills: 0 | Status: DISCONTINUED | OUTPATIENT
Start: 2017-02-21 | End: 2017-02-27

## 2017-02-21 RX ORDER — DEXTROSE 50 % IN WATER 50 %
25 SYRINGE (ML) INTRAVENOUS ONCE
Qty: 0 | Refills: 0 | Status: DISCONTINUED | OUTPATIENT
Start: 2017-02-21 | End: 2017-02-27

## 2017-02-21 RX ORDER — GLUCAGON INJECTION, SOLUTION 0.5 MG/.1ML
1 INJECTION, SOLUTION SUBCUTANEOUS ONCE
Qty: 0 | Refills: 0 | Status: DISCONTINUED | OUTPATIENT
Start: 2017-02-21 | End: 2017-02-27

## 2017-02-21 RX ORDER — INSULIN LISPRO 100/ML
VIAL (ML) SUBCUTANEOUS
Qty: 0 | Refills: 0 | Status: DISCONTINUED | OUTPATIENT
Start: 2017-02-21 | End: 2017-02-27

## 2017-02-21 RX ORDER — IPRATROPIUM/ALBUTEROL SULFATE 18-103MCG
3 AEROSOL WITH ADAPTER (GRAM) INHALATION EVERY 6 HOURS
Qty: 0 | Refills: 0 | Status: DISCONTINUED | OUTPATIENT
Start: 2017-02-21 | End: 2017-02-23

## 2017-02-21 RX ORDER — DEXTROSE 50 % IN WATER 50 %
12.5 SYRINGE (ML) INTRAVENOUS ONCE
Qty: 0 | Refills: 0 | Status: DISCONTINUED | OUTPATIENT
Start: 2017-02-21 | End: 2017-02-27

## 2017-02-21 RX ORDER — PANTOPRAZOLE SODIUM 20 MG/1
40 TABLET, DELAYED RELEASE ORAL
Qty: 0 | Refills: 0 | Status: DISCONTINUED | OUTPATIENT
Start: 2017-02-21 | End: 2017-02-27

## 2017-02-21 RX ORDER — IPRATROPIUM/ALBUTEROL SULFATE 18-103MCG
3 AEROSOL WITH ADAPTER (GRAM) INHALATION ONCE
Qty: 0 | Refills: 0 | Status: COMPLETED | OUTPATIENT
Start: 2017-02-21 | End: 2017-02-21

## 2017-02-21 RX ORDER — INSULIN LISPRO 100/ML
2 VIAL (ML) SUBCUTANEOUS ONCE
Qty: 0 | Refills: 0 | Status: COMPLETED | OUTPATIENT
Start: 2017-02-21 | End: 2017-02-21

## 2017-02-21 RX ORDER — ACETAMINOPHEN 500 MG
650 TABLET ORAL ONCE
Qty: 0 | Refills: 0 | Status: COMPLETED | OUTPATIENT
Start: 2017-02-21 | End: 2017-02-21

## 2017-02-21 RX ORDER — GABAPENTIN 400 MG/1
300 CAPSULE ORAL ONCE
Qty: 0 | Refills: 0 | Status: COMPLETED | OUTPATIENT
Start: 2017-02-21 | End: 2017-02-21

## 2017-02-21 RX ORDER — SODIUM CHLORIDE 9 MG/ML
1000 INJECTION, SOLUTION INTRAVENOUS
Qty: 0 | Refills: 0 | Status: DISCONTINUED | OUTPATIENT
Start: 2017-02-21 | End: 2017-02-27

## 2017-02-21 RX ORDER — LOSARTAN POTASSIUM 100 MG/1
50 TABLET, FILM COATED ORAL DAILY
Qty: 0 | Refills: 0 | Status: DISCONTINUED | OUTPATIENT
Start: 2017-02-21 | End: 2017-02-27

## 2017-02-21 RX ORDER — DEXTROSE 50 % IN WATER 50 %
1 SYRINGE (ML) INTRAVENOUS ONCE
Qty: 0 | Refills: 0 | Status: DISCONTINUED | OUTPATIENT
Start: 2017-02-21 | End: 2017-02-27

## 2017-02-21 RX ADMIN — Medication: at 18:45

## 2017-02-21 RX ADMIN — Medication 2 UNIT(S): at 18:13

## 2017-02-21 RX ADMIN — Medication 3 MILLILITER(S): at 13:29

## 2017-02-21 RX ADMIN — Medication 650 MILLIGRAM(S): at 13:23

## 2017-02-21 RX ADMIN — LOSARTAN POTASSIUM 50 MILLIGRAM(S): 100 TABLET, FILM COATED ORAL at 15:06

## 2017-02-21 RX ADMIN — Medication 3 MILLILITER(S): at 13:40

## 2017-02-21 RX ADMIN — Medication 650 MILLIGRAM(S): at 14:36

## 2017-02-21 RX ADMIN — Medication 3 MILLILITER(S): at 13:23

## 2017-02-21 RX ADMIN — HEPARIN SODIUM 5000 UNIT(S): 5000 INJECTION INTRAVENOUS; SUBCUTANEOUS at 18:14

## 2017-02-21 RX ADMIN — GABAPENTIN 300 MILLIGRAM(S): 400 CAPSULE ORAL at 15:06

## 2017-02-21 NOTE — ED PROVIDER NOTE - ATTENDING CONTRIBUTION TO CARE
Dr Bloch- Patient alert NAD, HEENT nml , no stridor, lungs clear, abd soft, heart sounds nml, ext nml, IMP stable tracheal stenosis, concern for infection, labs cxr, medicaid lapse  incurring inability to comply with DM CAD and breathing treatments.

## 2017-02-21 NOTE — ED ADULT NURSE REASSESSMENT NOTE - NS ED NURSE REASSESS COMMENT FT1
pt AO x3, ambulatory, c/o chest pain and leg pain. Due medication given as ordered but pain has not changed. MD informed. CXR done. VSS at his baseline. Will continue to monitor.

## 2017-02-21 NOTE — ED ADULT NURSE REASSESSMENT NOTE - NS ED NURSE REASSESS COMMENT FT1
pt AOx3, will be admitted for tracheal stenosis. Discussed with MD Mirza. #[id number]. Allowed to eat. Will continue to monitor.

## 2017-02-21 NOTE — ED ADULT NURSE NOTE - OBJECTIVE STATEMENT
Alert and oriented x 4. Pt received to spot 22 complaining of abdominal pain 6/10 on and off , non radiating x 1 week and the feeling of throat closing . Pt states this happened before and he came to the hospital. Pt states he is nauseous and has been vomiting . Pt denies chest pain ,shortness of breath, dizziness or diarrhea. Pt ambulates. IV 20g to left AC. Labs drawn. VSS. Pt is sinus rhythm on cardiac monitor. Will continue to monitor. RN Facilitator.

## 2017-02-21 NOTE — ED PROVIDER NOTE - OBJECTIVE STATEMENT
56yoM hx of htn, hld, dm, tracheal stenosis pw increasing shortness of breath made worse when laying down, as well as increasing cough with post tussive emesis.   Dr. Blackman CT surgery

## 2017-02-21 NOTE — ED PROVIDER NOTE - PMH
AA (alcohol abuse)    Detached retina, right  Right eye blind  DM (diabetes mellitus)    DM (diabetes mellitus), type 2    H/O: HTN (hypertension)    HLD (hyperlipidemia)    HTN (hypertension)    Seizure  secondary to alcohol abuse  Tracheal stenosis

## 2017-02-21 NOTE — ED ADULT NURSE REASSESSMENT NOTE - NS ED NURSE REASSESS COMMENT FT1
Report given 9:10PM to RN for T802A. Patient appears comfortable. Awaiting transport. Will continue to monitor.

## 2017-02-21 NOTE — ED ADULT TRIAGE NOTE - CHIEF COMPLAINT QUOTE
C/o throat discomfort, SOB, chest pain, dizziness/lightheadedness, and fevers/chills x 1 month. Describes as "throat is closing." PMH tracheal stenosis, HTN, seizures. Harsh stridor like breathing and labored noted. No cyanosis noted. Claims that has not taken medications because of insurance issues.

## 2017-02-21 NOTE — H&P ADULT. - HISTORY OF PRESENT ILLNESS
The patient is a 56-year-old male w/history of tracheal stenosis, s/p flex bronch, laser fulgeration, electrofulgeration & cryotherapy on 1/26/2017 who presents to the ED today with two days of worsening shortness of breath, dyspnea and one episode of post-tussive emesis. The patient was discharged following his procedure on 1/27/2016 on Duonebs and symbicort. The patient is stridorous with end-expiratory wheezing who is currently saturating 100% on room air, is afebrile, hemodynamically stable and denies chills, chest pain or nausea.

## 2017-02-22 PROCEDURE — 99223 1ST HOSP IP/OBS HIGH 75: CPT

## 2017-02-22 PROCEDURE — 99232 SBSQ HOSP IP/OBS MODERATE 35: CPT

## 2017-02-22 PROCEDURE — 99223 1ST HOSP IP/OBS HIGH 75: CPT | Mod: GC

## 2017-02-22 PROCEDURE — 71250 CT THORAX DX C-: CPT | Mod: 26

## 2017-02-22 PROCEDURE — 70490 CT SOFT TISSUE NECK W/O DYE: CPT | Mod: 26

## 2017-02-22 RX ORDER — QUETIAPINE FUMARATE 200 MG/1
100 TABLET, FILM COATED ORAL
Qty: 0 | Refills: 0 | Status: DISCONTINUED | OUTPATIENT
Start: 2017-02-22 | End: 2017-02-24

## 2017-02-22 RX ORDER — BENZOYL PEROXIDE 50 MG/ML
1 GEL TOPICAL DAILY
Qty: 0 | Refills: 0 | Status: DISCONTINUED | OUTPATIENT
Start: 2017-02-22 | End: 2017-02-22

## 2017-02-22 RX ORDER — QUETIAPINE FUMARATE 200 MG/1
200 TABLET, FILM COATED ORAL EVERY 12 HOURS
Qty: 0 | Refills: 0 | Status: DISCONTINUED | OUTPATIENT
Start: 2017-02-22 | End: 2017-02-22

## 2017-02-22 RX ORDER — OXYCODONE HYDROCHLORIDE 5 MG/1
5 TABLET ORAL EVERY 4 HOURS
Qty: 0 | Refills: 0 | Status: DISCONTINUED | OUTPATIENT
Start: 2017-02-22 | End: 2017-02-22

## 2017-02-22 RX ORDER — BUDESONIDE AND FORMOTEROL FUMARATE DIHYDRATE 160; 4.5 UG/1; UG/1
2 AEROSOL RESPIRATORY (INHALATION)
Qty: 0 | Refills: 0 | Status: DISCONTINUED | OUTPATIENT
Start: 2017-02-22 | End: 2017-02-22

## 2017-02-22 RX ORDER — ACETAMINOPHEN 500 MG
650 TABLET ORAL EVERY 6 HOURS
Qty: 0 | Refills: 0 | Status: DISCONTINUED | OUTPATIENT
Start: 2017-02-22 | End: 2017-02-27

## 2017-02-22 RX ORDER — BENZOYL PEROXIDE 50 MG/ML
1 GEL TOPICAL DAILY
Qty: 0 | Refills: 0 | Status: DISCONTINUED | OUTPATIENT
Start: 2017-02-22 | End: 2017-02-27

## 2017-02-22 RX ORDER — POLYETHYLENE GLYCOL 3350 17 G/17G
17 POWDER, FOR SOLUTION ORAL DAILY
Qty: 0 | Refills: 0 | Status: DISCONTINUED | OUTPATIENT
Start: 2017-02-22 | End: 2017-02-27

## 2017-02-22 RX ORDER — GLYCERIN ADULT
1 SUPPOSITORY, RECTAL RECTAL DAILY
Qty: 0 | Refills: 0 | Status: DISCONTINUED | OUTPATIENT
Start: 2017-02-22 | End: 2017-02-27

## 2017-02-22 RX ORDER — AER TRAVELER 0.5 G/1
1 SOLUTION RECTAL; TOPICAL
Qty: 0 | Refills: 0 | Status: DISCONTINUED | OUTPATIENT
Start: 2017-02-22 | End: 2017-02-27

## 2017-02-22 RX ORDER — IPRATROPIUM BROMIDE 0.2 MG/ML
500 SOLUTION, NON-ORAL INHALATION EVERY 6 HOURS
Qty: 0 | Refills: 0 | Status: DISCONTINUED | OUTPATIENT
Start: 2017-02-22 | End: 2017-02-22

## 2017-02-22 RX ORDER — QUETIAPINE FUMARATE 200 MG/1
200 TABLET, FILM COATED ORAL EVERY 24 HOURS
Qty: 0 | Refills: 0 | Status: DISCONTINUED | OUTPATIENT
Start: 2017-02-22 | End: 2017-02-22

## 2017-02-22 RX ORDER — HYDROCORTISONE 1 %
1 OINTMENT (GRAM) TOPICAL
Qty: 0 | Refills: 0 | Status: DISCONTINUED | OUTPATIENT
Start: 2017-02-22 | End: 2017-02-22

## 2017-02-22 RX ORDER — OXYCODONE HYDROCHLORIDE 5 MG/1
5 TABLET ORAL EVERY 4 HOURS
Qty: 0 | Refills: 0 | Status: DISCONTINUED | OUTPATIENT
Start: 2017-02-22 | End: 2017-02-27

## 2017-02-22 RX ORDER — GABAPENTIN 400 MG/1
300 CAPSULE ORAL EVERY 8 HOURS
Qty: 0 | Refills: 0 | Status: DISCONTINUED | OUTPATIENT
Start: 2017-02-22 | End: 2017-02-27

## 2017-02-22 RX ORDER — QUETIAPINE FUMARATE 200 MG/1
200 TABLET, FILM COATED ORAL AT BEDTIME
Qty: 0 | Refills: 0 | Status: DISCONTINUED | OUTPATIENT
Start: 2017-02-22 | End: 2017-02-24

## 2017-02-22 RX ORDER — HYDROCORTISONE 1 %
1 OINTMENT (GRAM) TOPICAL DAILY
Qty: 0 | Refills: 0 | Status: DISCONTINUED | OUTPATIENT
Start: 2017-02-22 | End: 2017-02-22

## 2017-02-22 RX ORDER — BUDESONIDE AND FORMOTEROL FUMARATE DIHYDRATE 160; 4.5 UG/1; UG/1
2 AEROSOL RESPIRATORY (INHALATION)
Qty: 0 | Refills: 0 | Status: DISCONTINUED | OUTPATIENT
Start: 2017-02-22 | End: 2017-02-27

## 2017-02-22 RX ORDER — CLINDAMYCIN PHOSPHATE GEL USP, 1% 10 MG/G
1 GEL TOPICAL
Qty: 0 | Refills: 0 | Status: DISCONTINUED | OUTPATIENT
Start: 2017-02-22 | End: 2017-02-27

## 2017-02-22 RX ORDER — ALBUTEROL 90 UG/1
2.5 AEROSOL, METERED ORAL EVERY 6 HOURS
Qty: 0 | Refills: 0 | Status: DISCONTINUED | OUTPATIENT
Start: 2017-02-22 | End: 2017-02-22

## 2017-02-22 RX ORDER — OXYCODONE HYDROCHLORIDE 5 MG/1
10 TABLET ORAL EVERY 4 HOURS
Qty: 0 | Refills: 0 | Status: DISCONTINUED | OUTPATIENT
Start: 2017-02-22 | End: 2017-02-27

## 2017-02-22 RX ORDER — DOCUSATE SODIUM 100 MG
100 CAPSULE ORAL THREE TIMES A DAY
Qty: 0 | Refills: 0 | Status: DISCONTINUED | OUTPATIENT
Start: 2017-02-22 | End: 2017-02-27

## 2017-02-22 RX ADMIN — Medication 1 APPLICATION(S): at 06:09

## 2017-02-22 RX ADMIN — HEPARIN SODIUM 5000 UNIT(S): 5000 INJECTION INTRAVENOUS; SUBCUTANEOUS at 17:17

## 2017-02-22 RX ADMIN — BENZOYL PEROXIDE 1 APPLICATION(S): 50 GEL TOPICAL at 20:11

## 2017-02-22 RX ADMIN — LOSARTAN POTASSIUM 50 MILLIGRAM(S): 100 TABLET, FILM COATED ORAL at 06:08

## 2017-02-22 RX ADMIN — Medication 3 MILLILITER(S): at 16:06

## 2017-02-22 RX ADMIN — QUETIAPINE FUMARATE 200 MILLIGRAM(S): 200 TABLET, FILM COATED ORAL at 21:39

## 2017-02-22 RX ADMIN — OXYCODONE HYDROCHLORIDE 10 MILLIGRAM(S): 5 TABLET ORAL at 09:27

## 2017-02-22 RX ADMIN — OXYCODONE HYDROCHLORIDE 10 MILLIGRAM(S): 5 TABLET ORAL at 12:37

## 2017-02-22 RX ADMIN — OXYCODONE HYDROCHLORIDE 10 MILLIGRAM(S): 5 TABLET ORAL at 13:30

## 2017-02-22 RX ADMIN — Medication 100 MILLIGRAM(S): at 12:37

## 2017-02-22 RX ADMIN — OXYCODONE HYDROCHLORIDE 10 MILLIGRAM(S): 5 TABLET ORAL at 08:49

## 2017-02-22 RX ADMIN — Medication 3 MILLILITER(S): at 21:55

## 2017-02-22 RX ADMIN — Medication 1 SUPPOSITORY(S): at 12:37

## 2017-02-22 RX ADMIN — Medication 100 MILLIGRAM(S): at 06:08

## 2017-02-22 RX ADMIN — GABAPENTIN 300 MILLIGRAM(S): 400 CAPSULE ORAL at 21:39

## 2017-02-22 RX ADMIN — CLINDAMYCIN PHOSPHATE GEL USP, 1% 1 APPLICATION(S): 10 GEL TOPICAL at 21:39

## 2017-02-22 RX ADMIN — GABAPENTIN 300 MILLIGRAM(S): 400 CAPSULE ORAL at 12:37

## 2017-02-22 RX ADMIN — Medication 1 APPLICATION(S): at 21:39

## 2017-02-22 RX ADMIN — AER TRAVELER 1 APPLICATION(S): 0.5 SOLUTION RECTAL; TOPICAL at 19:52

## 2017-02-22 RX ADMIN — AER TRAVELER 1 APPLICATION(S): 0.5 SOLUTION RECTAL; TOPICAL at 03:00

## 2017-02-22 RX ADMIN — OXYCODONE HYDROCHLORIDE 10 MILLIGRAM(S): 5 TABLET ORAL at 19:52

## 2017-02-22 RX ADMIN — Medication 100 MILLIGRAM(S): at 21:39

## 2017-02-22 RX ADMIN — OXYCODONE HYDROCHLORIDE 10 MILLIGRAM(S): 5 TABLET ORAL at 20:39

## 2017-02-22 RX ADMIN — BUDESONIDE AND FORMOTEROL FUMARATE DIHYDRATE 2 PUFF(S): 160; 4.5 AEROSOL RESPIRATORY (INHALATION) at 21:39

## 2017-02-22 RX ADMIN — HEPARIN SODIUM 5000 UNIT(S): 5000 INJECTION INTRAVENOUS; SUBCUTANEOUS at 06:09

## 2017-02-22 RX ADMIN — Medication 3 MILLILITER(S): at 10:14

## 2017-02-22 RX ADMIN — Medication 3 MILLILITER(S): at 03:46

## 2017-02-22 RX ADMIN — PANTOPRAZOLE SODIUM 40 MILLIGRAM(S): 20 TABLET, DELAYED RELEASE ORAL at 06:08

## 2017-02-23 RX ORDER — ASPIRIN/CALCIUM CARB/MAGNESIUM 324 MG
81 TABLET ORAL DAILY
Qty: 0 | Refills: 0 | Status: DISCONTINUED | OUTPATIENT
Start: 2017-02-23 | End: 2017-02-27

## 2017-02-23 RX ORDER — LEVALBUTEROL 1.25 MG/.5ML
0.63 SOLUTION, CONCENTRATE RESPIRATORY (INHALATION) EVERY 6 HOURS
Qty: 0 | Refills: 0 | Status: DISCONTINUED | OUTPATIENT
Start: 2017-02-23 | End: 2017-02-27

## 2017-02-23 RX ORDER — GLYCERIN ADULT
1 SUPPOSITORY, RECTAL RECTAL ONCE
Qty: 0 | Refills: 0 | Status: COMPLETED | OUTPATIENT
Start: 2017-02-23 | End: 2017-02-23

## 2017-02-23 RX ADMIN — Medication 1 APPLICATION(S): at 13:11

## 2017-02-23 RX ADMIN — Medication 1 SUPPOSITORY(S): at 17:07

## 2017-02-23 RX ADMIN — HEPARIN SODIUM 5000 UNIT(S): 5000 INJECTION INTRAVENOUS; SUBCUTANEOUS at 17:07

## 2017-02-23 RX ADMIN — OXYCODONE HYDROCHLORIDE 10 MILLIGRAM(S): 5 TABLET ORAL at 15:05

## 2017-02-23 RX ADMIN — BUDESONIDE AND FORMOTEROL FUMARATE DIHYDRATE 2 PUFF(S): 160; 4.5 AEROSOL RESPIRATORY (INHALATION) at 21:48

## 2017-02-23 RX ADMIN — LOSARTAN POTASSIUM 50 MILLIGRAM(S): 100 TABLET, FILM COATED ORAL at 06:16

## 2017-02-23 RX ADMIN — AER TRAVELER 1 APPLICATION(S): 0.5 SOLUTION RECTAL; TOPICAL at 17:06

## 2017-02-23 RX ADMIN — Medication 650 MILLIGRAM(S): at 17:06

## 2017-02-23 RX ADMIN — GABAPENTIN 300 MILLIGRAM(S): 400 CAPSULE ORAL at 21:47

## 2017-02-23 RX ADMIN — QUETIAPINE FUMARATE 100 MILLIGRAM(S): 200 TABLET, FILM COATED ORAL at 09:20

## 2017-02-23 RX ADMIN — GABAPENTIN 300 MILLIGRAM(S): 400 CAPSULE ORAL at 06:16

## 2017-02-23 RX ADMIN — GABAPENTIN 300 MILLIGRAM(S): 400 CAPSULE ORAL at 13:12

## 2017-02-23 RX ADMIN — OXYCODONE HYDROCHLORIDE 10 MILLIGRAM(S): 5 TABLET ORAL at 16:06

## 2017-02-23 RX ADMIN — Medication 3 MILLILITER(S): at 03:17

## 2017-02-23 RX ADMIN — Medication 100 MILLIGRAM(S): at 06:16

## 2017-02-23 RX ADMIN — Medication 650 MILLIGRAM(S): at 18:00

## 2017-02-23 RX ADMIN — QUETIAPINE FUMARATE 200 MILLIGRAM(S): 200 TABLET, FILM COATED ORAL at 21:46

## 2017-02-23 RX ADMIN — OXYCODONE HYDROCHLORIDE 10 MILLIGRAM(S): 5 TABLET ORAL at 09:17

## 2017-02-23 RX ADMIN — CLINDAMYCIN PHOSPHATE GEL USP, 1% 1 APPLICATION(S): 10 GEL TOPICAL at 17:06

## 2017-02-23 RX ADMIN — OXYCODONE HYDROCHLORIDE 10 MILLIGRAM(S): 5 TABLET ORAL at 21:53

## 2017-02-23 RX ADMIN — Medication 1 APPLICATION(S): at 06:16

## 2017-02-23 RX ADMIN — BUDESONIDE AND FORMOTEROL FUMARATE DIHYDRATE 2 PUFF(S): 160; 4.5 AEROSOL RESPIRATORY (INHALATION) at 09:20

## 2017-02-23 RX ADMIN — BENZOYL PEROXIDE 1 APPLICATION(S): 50 GEL TOPICAL at 13:12

## 2017-02-23 RX ADMIN — Medication 3 MILLILITER(S): at 09:39

## 2017-02-23 RX ADMIN — OXYCODONE HYDROCHLORIDE 10 MILLIGRAM(S): 5 TABLET ORAL at 10:17

## 2017-02-23 RX ADMIN — OXYCODONE HYDROCHLORIDE 10 MILLIGRAM(S): 5 TABLET ORAL at 22:23

## 2017-02-23 RX ADMIN — CLINDAMYCIN PHOSPHATE GEL USP, 1% 1 APPLICATION(S): 10 GEL TOPICAL at 06:16

## 2017-02-23 RX ADMIN — Medication 1 SUPPOSITORY(S): at 21:48

## 2017-02-23 RX ADMIN — Medication 1 APPLICATION(S): at 21:47

## 2017-02-23 RX ADMIN — Medication 100 MILLIGRAM(S): at 21:47

## 2017-02-23 RX ADMIN — HEPARIN SODIUM 5000 UNIT(S): 5000 INJECTION INTRAVENOUS; SUBCUTANEOUS at 06:16

## 2017-02-23 RX ADMIN — Medication 100 MILLIGRAM(S): at 13:11

## 2017-02-23 RX ADMIN — PANTOPRAZOLE SODIUM 40 MILLIGRAM(S): 20 TABLET, DELAYED RELEASE ORAL at 06:16

## 2017-02-24 PROCEDURE — 99232 SBSQ HOSP IP/OBS MODERATE 35: CPT

## 2017-02-24 PROCEDURE — 71010: CPT | Mod: 26

## 2017-02-24 RX ORDER — ATORVASTATIN CALCIUM 80 MG/1
80 TABLET, FILM COATED ORAL AT BEDTIME
Qty: 0 | Refills: 0 | Status: DISCONTINUED | OUTPATIENT
Start: 2017-02-24 | End: 2017-02-27

## 2017-02-24 RX ORDER — QUETIAPINE FUMARATE 200 MG/1
200 TABLET, FILM COATED ORAL
Qty: 0 | Refills: 0 | Status: DISCONTINUED | OUTPATIENT
Start: 2017-02-24 | End: 2017-02-27

## 2017-02-24 RX ORDER — THIAMINE MONONITRATE (VIT B1) 100 MG
100 TABLET ORAL DAILY
Qty: 0 | Refills: 0 | Status: DISCONTINUED | OUTPATIENT
Start: 2017-02-24 | End: 2017-02-27

## 2017-02-24 RX ORDER — FOLIC ACID 0.8 MG
1 TABLET ORAL DAILY
Qty: 0 | Refills: 0 | Status: DISCONTINUED | OUTPATIENT
Start: 2017-02-24 | End: 2017-02-27

## 2017-02-24 RX ORDER — IBUPROFEN 200 MG
400 TABLET ORAL EVERY 6 HOURS
Qty: 0 | Refills: 0 | Status: DISCONTINUED | OUTPATIENT
Start: 2017-02-24 | End: 2017-02-25

## 2017-02-24 RX ADMIN — OXYCODONE HYDROCHLORIDE 10 MILLIGRAM(S): 5 TABLET ORAL at 05:48

## 2017-02-24 RX ADMIN — GABAPENTIN 300 MILLIGRAM(S): 400 CAPSULE ORAL at 05:15

## 2017-02-24 RX ADMIN — QUETIAPINE FUMARATE 200 MILLIGRAM(S): 200 TABLET, FILM COATED ORAL at 18:12

## 2017-02-24 RX ADMIN — BUDESONIDE AND FORMOTEROL FUMARATE DIHYDRATE 2 PUFF(S): 160; 4.5 AEROSOL RESPIRATORY (INHALATION) at 22:51

## 2017-02-24 RX ADMIN — OXYCODONE HYDROCHLORIDE 10 MILLIGRAM(S): 5 TABLET ORAL at 16:00

## 2017-02-24 RX ADMIN — CLINDAMYCIN PHOSPHATE GEL USP, 1% 1 APPLICATION(S): 10 GEL TOPICAL at 05:20

## 2017-02-24 RX ADMIN — Medication 81 MILLIGRAM(S): at 11:59

## 2017-02-24 RX ADMIN — Medication 100 MILLIGRAM(S): at 21:19

## 2017-02-24 RX ADMIN — LOSARTAN POTASSIUM 50 MILLIGRAM(S): 100 TABLET, FILM COATED ORAL at 05:15

## 2017-02-24 RX ADMIN — Medication 1 APPLICATION(S): at 05:13

## 2017-02-24 RX ADMIN — Medication 1 APPLICATION(S): at 22:37

## 2017-02-24 RX ADMIN — GABAPENTIN 300 MILLIGRAM(S): 400 CAPSULE ORAL at 13:04

## 2017-02-24 RX ADMIN — Medication 100 MILLIGRAM(S): at 05:15

## 2017-02-24 RX ADMIN — HEPARIN SODIUM 5000 UNIT(S): 5000 INJECTION INTRAVENOUS; SUBCUTANEOUS at 18:11

## 2017-02-24 RX ADMIN — BENZOYL PEROXIDE 1 APPLICATION(S): 50 GEL TOPICAL at 12:01

## 2017-02-24 RX ADMIN — Medication 1 APPLICATION(S): at 13:05

## 2017-02-24 RX ADMIN — CLINDAMYCIN PHOSPHATE GEL USP, 1% 1 APPLICATION(S): 10 GEL TOPICAL at 18:12

## 2017-02-24 RX ADMIN — PANTOPRAZOLE SODIUM 40 MILLIGRAM(S): 20 TABLET, DELAYED RELEASE ORAL at 05:19

## 2017-02-24 RX ADMIN — Medication 100 MILLIGRAM(S): at 11:59

## 2017-02-24 RX ADMIN — OXYCODONE HYDROCHLORIDE 10 MILLIGRAM(S): 5 TABLET ORAL at 05:18

## 2017-02-24 RX ADMIN — BUDESONIDE AND FORMOTEROL FUMARATE DIHYDRATE 2 PUFF(S): 160; 4.5 AEROSOL RESPIRATORY (INHALATION) at 09:49

## 2017-02-24 RX ADMIN — Medication 1 SUPPOSITORY(S): at 12:00

## 2017-02-24 RX ADMIN — ATORVASTATIN CALCIUM 80 MILLIGRAM(S): 80 TABLET, FILM COATED ORAL at 21:19

## 2017-02-24 RX ADMIN — Medication 1 MILLIGRAM(S): at 12:00

## 2017-02-24 RX ADMIN — QUETIAPINE FUMARATE 100 MILLIGRAM(S): 200 TABLET, FILM COATED ORAL at 08:02

## 2017-02-24 RX ADMIN — GABAPENTIN 300 MILLIGRAM(S): 400 CAPSULE ORAL at 21:19

## 2017-02-24 RX ADMIN — Medication 100 MILLIGRAM(S): at 13:05

## 2017-02-24 RX ADMIN — OXYCODONE HYDROCHLORIDE 10 MILLIGRAM(S): 5 TABLET ORAL at 15:30

## 2017-02-24 RX ADMIN — Medication 400 MILLIGRAM(S): at 17:05

## 2017-02-24 RX ADMIN — HEPARIN SODIUM 5000 UNIT(S): 5000 INJECTION INTRAVENOUS; SUBCUTANEOUS at 05:15

## 2017-02-24 RX ADMIN — Medication 400 MILLIGRAM(S): at 17:35

## 2017-02-24 NOTE — PROVIDER CONTACT NOTE (OTHER) - RECOMMENDATIONS
10 mg oxycodone PO given as per md order
oxycodone given 10 mg as per md order. Will continue to monitor
Continue to monitor patient closely

## 2017-02-25 LAB
BLD GP AB SCN SERPL QL: NEGATIVE — SIGNIFICANT CHANGE UP
BUN SERPL-MCNC: 18 MG/DL — SIGNIFICANT CHANGE UP (ref 7–23)
CALCIUM SERPL-MCNC: 9.2 MG/DL — SIGNIFICANT CHANGE UP (ref 8.4–10.5)
CHLORIDE SERPL-SCNC: 100 MMOL/L — SIGNIFICANT CHANGE UP (ref 98–107)
CO2 SERPL-SCNC: 26 MMOL/L — SIGNIFICANT CHANGE UP (ref 22–31)
CREAT SERPL-MCNC: 0.7 MG/DL — SIGNIFICANT CHANGE UP (ref 0.5–1.3)
GLUCOSE SERPL-MCNC: 134 MG/DL — HIGH (ref 70–99)
HCT VFR BLD CALC: 38.2 % — LOW (ref 39–50)
HGB BLD-MCNC: 12.3 G/DL — LOW (ref 13–17)
MCHC RBC-ENTMCNC: 29.5 PG — SIGNIFICANT CHANGE UP (ref 27–34)
MCHC RBC-ENTMCNC: 32.2 % — SIGNIFICANT CHANGE UP (ref 32–36)
MCV RBC AUTO: 91.6 FL — SIGNIFICANT CHANGE UP (ref 80–100)
PLATELET # BLD AUTO: 443 K/UL — HIGH (ref 150–400)
PMV BLD: 9.8 FL — SIGNIFICANT CHANGE UP (ref 7–13)
POTASSIUM SERPL-MCNC: 4.9 MMOL/L — SIGNIFICANT CHANGE UP (ref 3.5–5.3)
POTASSIUM SERPL-SCNC: 4.9 MMOL/L — SIGNIFICANT CHANGE UP (ref 3.5–5.3)
RBC # BLD: 4.17 M/UL — LOW (ref 4.2–5.8)
RBC # FLD: 15.4 % — HIGH (ref 10.3–14.5)
RH IG SCN BLD-IMP: POSITIVE — SIGNIFICANT CHANGE UP
SODIUM SERPL-SCNC: 139 MMOL/L — SIGNIFICANT CHANGE UP (ref 135–145)
WBC # BLD: 8.19 K/UL — SIGNIFICANT CHANGE UP (ref 3.8–10.5)
WBC # FLD AUTO: 8.19 K/UL — SIGNIFICANT CHANGE UP (ref 3.8–10.5)

## 2017-02-25 RX ORDER — PHENYLEPHRINE-SHARK LIVER OIL-MINERAL OIL-PETROLATUM RECTAL OINTMENT
1 OINTMENT (GRAM) RECTAL
Qty: 0 | Refills: 0 | Status: DISCONTINUED | OUTPATIENT
Start: 2017-02-25 | End: 2017-02-25

## 2017-02-25 RX ORDER — HYDROCORTISONE 1 %
1 OINTMENT (GRAM) TOPICAL
Qty: 0 | Refills: 0 | Status: DISCONTINUED | OUTPATIENT
Start: 2017-02-25 | End: 2017-02-27

## 2017-02-25 RX ORDER — CEPHALEXIN 500 MG
500 CAPSULE ORAL
Qty: 0 | Refills: 0 | Status: DISCONTINUED | OUTPATIENT
Start: 2017-02-25 | End: 2017-02-27

## 2017-02-25 RX ORDER — IBUPROFEN 200 MG
400 TABLET ORAL EVERY 8 HOURS
Qty: 0 | Refills: 0 | Status: DISCONTINUED | OUTPATIENT
Start: 2017-02-25 | End: 2017-02-27

## 2017-02-25 RX ADMIN — QUETIAPINE FUMARATE 200 MILLIGRAM(S): 200 TABLET, FILM COATED ORAL at 05:49

## 2017-02-25 RX ADMIN — Medication 400 MILLIGRAM(S): at 15:20

## 2017-02-25 RX ADMIN — OXYCODONE HYDROCHLORIDE 10 MILLIGRAM(S): 5 TABLET ORAL at 15:20

## 2017-02-25 RX ADMIN — AER TRAVELER 1 APPLICATION(S): 0.5 SOLUTION RECTAL; TOPICAL at 12:14

## 2017-02-25 RX ADMIN — OXYCODONE HYDROCHLORIDE 10 MILLIGRAM(S): 5 TABLET ORAL at 04:05

## 2017-02-25 RX ADMIN — BUDESONIDE AND FORMOTEROL FUMARATE DIHYDRATE 2 PUFF(S): 160; 4.5 AEROSOL RESPIRATORY (INHALATION) at 21:50

## 2017-02-25 RX ADMIN — Medication 650 MILLIGRAM(S): at 17:34

## 2017-02-25 RX ADMIN — Medication 1 SUPPOSITORY(S): at 12:08

## 2017-02-25 RX ADMIN — Medication 1 APPLICATION(S): at 15:20

## 2017-02-25 RX ADMIN — Medication 100 MILLIGRAM(S): at 12:08

## 2017-02-25 RX ADMIN — Medication 650 MILLIGRAM(S): at 05:44

## 2017-02-25 RX ADMIN — LOSARTAN POTASSIUM 50 MILLIGRAM(S): 100 TABLET, FILM COATED ORAL at 05:45

## 2017-02-25 RX ADMIN — ATORVASTATIN CALCIUM 80 MILLIGRAM(S): 80 TABLET, FILM COATED ORAL at 21:50

## 2017-02-25 RX ADMIN — HEPARIN SODIUM 5000 UNIT(S): 5000 INJECTION INTRAVENOUS; SUBCUTANEOUS at 05:55

## 2017-02-25 RX ADMIN — Medication 100 MILLIGRAM(S): at 05:45

## 2017-02-25 RX ADMIN — Medication 100 MILLIGRAM(S): at 21:50

## 2017-02-25 RX ADMIN — HEPARIN SODIUM 5000 UNIT(S): 5000 INJECTION INTRAVENOUS; SUBCUTANEOUS at 17:33

## 2017-02-25 RX ADMIN — Medication 650 MILLIGRAM(S): at 06:44

## 2017-02-25 RX ADMIN — Medication: at 21:50

## 2017-02-25 RX ADMIN — BUDESONIDE AND FORMOTEROL FUMARATE DIHYDRATE 2 PUFF(S): 160; 4.5 AEROSOL RESPIRATORY (INHALATION) at 09:20

## 2017-02-25 RX ADMIN — CLINDAMYCIN PHOSPHATE GEL USP, 1% 1 APPLICATION(S): 10 GEL TOPICAL at 05:44

## 2017-02-25 RX ADMIN — Medication 1 APPLICATION(S): at 05:45

## 2017-02-25 RX ADMIN — GABAPENTIN 300 MILLIGRAM(S): 400 CAPSULE ORAL at 21:50

## 2017-02-25 RX ADMIN — PANTOPRAZOLE SODIUM 40 MILLIGRAM(S): 20 TABLET, DELAYED RELEASE ORAL at 05:45

## 2017-02-25 RX ADMIN — Medication 400 MILLIGRAM(S): at 22:50

## 2017-02-25 RX ADMIN — GABAPENTIN 300 MILLIGRAM(S): 400 CAPSULE ORAL at 05:55

## 2017-02-25 RX ADMIN — OXYCODONE HYDROCHLORIDE 10 MILLIGRAM(S): 5 TABLET ORAL at 16:18

## 2017-02-25 RX ADMIN — Medication 400 MILLIGRAM(S): at 16:18

## 2017-02-25 RX ADMIN — CLINDAMYCIN PHOSPHATE GEL USP, 1% 1 APPLICATION(S): 10 GEL TOPICAL at 17:33

## 2017-02-25 RX ADMIN — QUETIAPINE FUMARATE 200 MILLIGRAM(S): 200 TABLET, FILM COATED ORAL at 17:33

## 2017-02-25 RX ADMIN — Medication 81 MILLIGRAM(S): at 12:08

## 2017-02-25 RX ADMIN — BENZOYL PEROXIDE 1 APPLICATION(S): 50 GEL TOPICAL at 12:08

## 2017-02-25 RX ADMIN — Medication 650 MILLIGRAM(S): at 18:30

## 2017-02-25 RX ADMIN — Medication 1 APPLICATION(S): at 17:33

## 2017-02-25 RX ADMIN — Medication 100 MILLIGRAM(S): at 15:20

## 2017-02-25 RX ADMIN — Medication 1 MILLIGRAM(S): at 12:08

## 2017-02-25 RX ADMIN — Medication 500 MILLIGRAM(S): at 17:33

## 2017-02-25 RX ADMIN — Medication 400 MILLIGRAM(S): at 21:50

## 2017-02-25 RX ADMIN — OXYCODONE HYDROCHLORIDE 10 MILLIGRAM(S): 5 TABLET ORAL at 03:10

## 2017-02-25 RX ADMIN — Medication 1 APPLICATION(S): at 21:51

## 2017-02-25 RX ADMIN — GABAPENTIN 300 MILLIGRAM(S): 400 CAPSULE ORAL at 15:20

## 2017-02-25 NOTE — PROVIDER CONTACT NOTE (OTHER) - SITUATION
HR up to 130 on tele monitor, nonsustained
on assessment L AC site noted to be swollen, patient stating pain, slight leaking when flushing, when removed, skin noted to be indurated with pus like drainage. pt persistently tachycardia, HR [id number]
Chest pain midsternal 10/10  VSS Patient A&ox4

## 2017-02-25 NOTE — PROVIDER CONTACT NOTE (OTHER) - BACKGROUND
Pt admitted 2/21 for tracheal stenosis hx of DM, seizures, alcohol abuse, HTN, HLD
hx tracheal stenosis, admit for SOB.
Chest pain midsternal 10/10  VSS Patient A&ox4

## 2017-02-25 NOTE — PROVIDER CONTACT NOTE (OTHER) - ASSESSMENT
133 /86 Hr 87 Temp 97.9F oral
96 percent room air HR 91
Pt A&O x4. c/o pain at IV site. L AC swollen, indurated with pus like drainage. pt denies palpitations and chest pain.
Pt sleeping in bed

## 2017-02-25 NOTE — PROVIDER CONTACT NOTE (OTHER) - REASON
IV site and tachycardia
10/10 midsternal jeancarlos pain and throat  pain, Patient A&0x4.
Chest pain midsternal a nd throat pain 10/10  VSS Patient A&ox4
HR up to 130

## 2017-02-26 PROCEDURE — 99232 SBSQ HOSP IP/OBS MODERATE 35: CPT

## 2017-02-26 RX ADMIN — Medication 100 MILLIGRAM(S): at 12:21

## 2017-02-26 RX ADMIN — Medication 2: at 22:55

## 2017-02-26 RX ADMIN — CLINDAMYCIN PHOSPHATE GEL USP, 1% 1 APPLICATION(S): 10 GEL TOPICAL at 17:16

## 2017-02-26 RX ADMIN — GABAPENTIN 300 MILLIGRAM(S): 400 CAPSULE ORAL at 22:54

## 2017-02-26 RX ADMIN — Medication 500 MILLIGRAM(S): at 12:21

## 2017-02-26 RX ADMIN — OXYCODONE HYDROCHLORIDE 10 MILLIGRAM(S): 5 TABLET ORAL at 09:40

## 2017-02-26 RX ADMIN — Medication 1 APPLICATION(S): at 05:34

## 2017-02-26 RX ADMIN — QUETIAPINE FUMARATE 200 MILLIGRAM(S): 200 TABLET, FILM COATED ORAL at 17:15

## 2017-02-26 RX ADMIN — Medication 1 APPLICATION(S): at 17:16

## 2017-02-26 RX ADMIN — Medication 1 APPLICATION(S): at 05:35

## 2017-02-26 RX ADMIN — Medication 500 MILLIGRAM(S): at 17:15

## 2017-02-26 RX ADMIN — OXYCODONE HYDROCHLORIDE 10 MILLIGRAM(S): 5 TABLET ORAL at 15:23

## 2017-02-26 RX ADMIN — Medication 1 SUPPOSITORY(S): at 12:23

## 2017-02-26 RX ADMIN — Medication 81 MILLIGRAM(S): at 12:21

## 2017-02-26 RX ADMIN — Medication 650 MILLIGRAM(S): at 13:20

## 2017-02-26 RX ADMIN — CLINDAMYCIN PHOSPHATE GEL USP, 1% 1 APPLICATION(S): 10 GEL TOPICAL at 05:34

## 2017-02-26 RX ADMIN — HEPARIN SODIUM 5000 UNIT(S): 5000 INJECTION INTRAVENOUS; SUBCUTANEOUS at 17:15

## 2017-02-26 RX ADMIN — QUETIAPINE FUMARATE 200 MILLIGRAM(S): 200 TABLET, FILM COATED ORAL at 05:35

## 2017-02-26 RX ADMIN — Medication 100 MILLIGRAM(S): at 15:19

## 2017-02-26 RX ADMIN — LOSARTAN POTASSIUM 50 MILLIGRAM(S): 100 TABLET, FILM COATED ORAL at 05:35

## 2017-02-26 RX ADMIN — Medication 400 MILLIGRAM(S): at 15:19

## 2017-02-26 RX ADMIN — GABAPENTIN 300 MILLIGRAM(S): 400 CAPSULE ORAL at 15:19

## 2017-02-26 RX ADMIN — ATORVASTATIN CALCIUM 80 MILLIGRAM(S): 80 TABLET, FILM COATED ORAL at 22:53

## 2017-02-26 RX ADMIN — Medication 500 MILLIGRAM(S): at 08:58

## 2017-02-26 RX ADMIN — Medication 1 MILLIGRAM(S): at 12:21

## 2017-02-26 RX ADMIN — Medication 650 MILLIGRAM(S): at 12:21

## 2017-02-26 RX ADMIN — Medication 500 MILLIGRAM(S): at 23:01

## 2017-02-26 RX ADMIN — Medication 400 MILLIGRAM(S): at 06:35

## 2017-02-26 RX ADMIN — OXYCODONE HYDROCHLORIDE 10 MILLIGRAM(S): 5 TABLET ORAL at 08:58

## 2017-02-26 RX ADMIN — Medication 400 MILLIGRAM(S): at 05:35

## 2017-02-26 RX ADMIN — BENZOYL PEROXIDE 1 APPLICATION(S): 50 GEL TOPICAL at 15:19

## 2017-02-26 RX ADMIN — GABAPENTIN 300 MILLIGRAM(S): 400 CAPSULE ORAL at 05:35

## 2017-02-26 RX ADMIN — PANTOPRAZOLE SODIUM 40 MILLIGRAM(S): 20 TABLET, DELAYED RELEASE ORAL at 05:35

## 2017-02-26 RX ADMIN — AER TRAVELER 1 APPLICATION(S): 0.5 SOLUTION RECTAL; TOPICAL at 12:27

## 2017-02-26 RX ADMIN — Medication 400 MILLIGRAM(S): at 16:15

## 2017-02-26 RX ADMIN — HEPARIN SODIUM 5000 UNIT(S): 5000 INJECTION INTRAVENOUS; SUBCUTANEOUS at 05:35

## 2017-02-26 RX ADMIN — BUDESONIDE AND FORMOTEROL FUMARATE DIHYDRATE 2 PUFF(S): 160; 4.5 AEROSOL RESPIRATORY (INHALATION) at 08:58

## 2017-02-26 RX ADMIN — Medication 1 APPLICATION(S): at 22:54

## 2017-02-26 RX ADMIN — OXYCODONE HYDROCHLORIDE 10 MILLIGRAM(S): 5 TABLET ORAL at 21:38

## 2017-02-26 RX ADMIN — Medication 500 MILLIGRAM(S): at 02:01

## 2017-02-26 RX ADMIN — OXYCODONE HYDROCHLORIDE 10 MILLIGRAM(S): 5 TABLET ORAL at 16:15

## 2017-02-26 RX ADMIN — Medication 100 MILLIGRAM(S): at 05:35

## 2017-02-26 RX ADMIN — Medication 400 MILLIGRAM(S): at 23:43

## 2017-02-26 RX ADMIN — Medication 400 MILLIGRAM(S): at 22:53

## 2017-02-26 RX ADMIN — Medication 1 APPLICATION(S): at 15:19

## 2017-02-26 RX ADMIN — BUDESONIDE AND FORMOTEROL FUMARATE DIHYDRATE 2 PUFF(S): 160; 4.5 AEROSOL RESPIRATORY (INHALATION) at 20:38

## 2017-02-26 RX ADMIN — Medication 100 MILLIGRAM(S): at 22:53

## 2017-02-26 RX ADMIN — OXYCODONE HYDROCHLORIDE 10 MILLIGRAM(S): 5 TABLET ORAL at 20:38

## 2017-02-27 ENCOUNTER — TRANSCRIPTION ENCOUNTER (OUTPATIENT)
Age: 57
End: 2017-02-27

## 2017-02-27 ENCOUNTER — APPOINTMENT (OUTPATIENT)
Dept: THORACIC SURGERY | Facility: HOSPITAL | Age: 57
End: 2017-02-27

## 2017-02-27 VITALS
DIASTOLIC BLOOD PRESSURE: 72 MMHG | TEMPERATURE: 99 F | HEART RATE: 114 BPM | RESPIRATION RATE: 18 BRPM | OXYGEN SATURATION: 96 % | SYSTOLIC BLOOD PRESSURE: 141 MMHG

## 2017-02-27 PROCEDURE — 99238 HOSP IP/OBS DSCHRG MGMT 30/<: CPT

## 2017-02-27 RX ORDER — CLINDAMYCIN PHOSPHATE GEL USP, 1% 10 MG/G
1 GEL TOPICAL
Qty: 1 | Refills: 0 | OUTPATIENT
Start: 2017-02-27 | End: 2017-03-06

## 2017-02-27 RX ORDER — ONDANSETRON 8 MG/1
4 TABLET, FILM COATED ORAL ONCE
Qty: 0 | Refills: 0 | Status: DISCONTINUED | OUTPATIENT
Start: 2017-02-27 | End: 2017-02-27

## 2017-02-27 RX ORDER — FOLIC ACID 0.8 MG
1 TABLET ORAL
Qty: 30 | Refills: 0 | OUTPATIENT
Start: 2017-02-27 | End: 2017-03-29

## 2017-02-27 RX ORDER — BENZOYL PEROXIDE 50 MG/ML
1 GEL TOPICAL
Qty: 1 | Refills: 0 | OUTPATIENT
Start: 2017-02-27 | End: 2017-03-06

## 2017-02-27 RX ORDER — ASPIRIN/CALCIUM CARB/MAGNESIUM 324 MG
1 TABLET ORAL
Qty: 0 | Refills: 0 | COMMUNITY
Start: 2017-02-27

## 2017-02-27 RX ORDER — POLYETHYLENE GLYCOL 3350 17 G/17G
17 POWDER, FOR SOLUTION ORAL
Qty: 0 | Refills: 0 | COMMUNITY
Start: 2017-02-27

## 2017-02-27 RX ORDER — LOSARTAN POTASSIUM 100 MG/1
1 TABLET, FILM COATED ORAL
Qty: 30 | Refills: 0 | OUTPATIENT
Start: 2017-02-27 | End: 2017-03-29

## 2017-02-27 RX ORDER — ATORVASTATIN CALCIUM 80 MG/1
1 TABLET, FILM COATED ORAL
Qty: 30 | Refills: 0 | OUTPATIENT
Start: 2017-02-27 | End: 2017-03-29

## 2017-02-27 RX ORDER — FENTANYL CITRATE 50 UG/ML
25 INJECTION INTRAVENOUS
Qty: 0 | Refills: 0 | Status: DISCONTINUED | OUTPATIENT
Start: 2017-02-27 | End: 2017-02-27

## 2017-02-27 RX ORDER — GABAPENTIN 400 MG/1
1 CAPSULE ORAL
Qty: 90 | Refills: 0 | OUTPATIENT
Start: 2017-02-27 | End: 2017-03-29

## 2017-02-27 RX ORDER — HYDROCORTISONE 1 %
1 OINTMENT (GRAM) TOPICAL
Qty: 1 | Refills: 0 | OUTPATIENT
Start: 2017-02-27 | End: 2017-03-06

## 2017-02-27 RX ORDER — QUETIAPINE FUMARATE 200 MG/1
1 TABLET, FILM COATED ORAL
Qty: 60 | Refills: 0 | OUTPATIENT
Start: 2017-02-27 | End: 2017-03-29

## 2017-02-27 RX ORDER — BUDESONIDE AND FORMOTEROL FUMARATE DIHYDRATE 160; 4.5 UG/1; UG/1
2 AEROSOL RESPIRATORY (INHALATION)
Qty: 1 | Refills: 0 | OUTPATIENT
Start: 2017-02-27 | End: 2017-03-29

## 2017-02-27 RX ORDER — DOCUSATE SODIUM 100 MG
1 CAPSULE ORAL
Qty: 0 | Refills: 0 | COMMUNITY
Start: 2017-02-27

## 2017-02-27 RX ORDER — AER TRAVELER 0.5 G/1
1 SOLUTION RECTAL; TOPICAL
Qty: 0 | Refills: 0 | COMMUNITY
Start: 2017-02-27

## 2017-02-27 RX ORDER — CEPHALEXIN 500 MG
1 CAPSULE ORAL
Qty: 12 | Refills: 0 | OUTPATIENT
Start: 2017-02-27 | End: 2017-03-02

## 2017-02-27 RX ORDER — METFORMIN HYDROCHLORIDE 850 MG/1
1 TABLET ORAL
Qty: 60 | Refills: 0 | OUTPATIENT
Start: 2017-02-27 | End: 2017-03-29

## 2017-02-27 RX ORDER — IBUPROFEN 200 MG
1 TABLET ORAL
Qty: 0 | Refills: 0 | COMMUNITY
Start: 2017-02-27

## 2017-02-27 RX ORDER — THIAMINE MONONITRATE (VIT B1) 100 MG
1 TABLET ORAL
Qty: 30 | Refills: 0 | OUTPATIENT
Start: 2017-02-27 | End: 2017-03-29

## 2017-02-27 RX ORDER — GLYCERIN ADULT
1 SUPPOSITORY, RECTAL RECTAL
Qty: 0 | Refills: 0 | COMMUNITY
Start: 2017-02-27

## 2017-02-27 RX ORDER — METFORMIN HYDROCHLORIDE 850 MG/1
1000 TABLET ORAL
Qty: 0 | Refills: 0 | Status: DISCONTINUED | OUTPATIENT
Start: 2017-02-27 | End: 2017-02-27

## 2017-02-27 RX ADMIN — Medication 400 MILLIGRAM(S): at 16:30

## 2017-02-27 RX ADMIN — Medication 100 MILLIGRAM(S): at 05:07

## 2017-02-27 RX ADMIN — GABAPENTIN 300 MILLIGRAM(S): 400 CAPSULE ORAL at 05:06

## 2017-02-27 RX ADMIN — Medication 1 SUPPOSITORY(S): at 12:22

## 2017-02-27 RX ADMIN — OXYCODONE HYDROCHLORIDE 10 MILLIGRAM(S): 5 TABLET ORAL at 05:06

## 2017-02-27 RX ADMIN — HEPARIN SODIUM 5000 UNIT(S): 5000 INJECTION INTRAVENOUS; SUBCUTANEOUS at 05:06

## 2017-02-27 RX ADMIN — PANTOPRAZOLE SODIUM 40 MILLIGRAM(S): 20 TABLET, DELAYED RELEASE ORAL at 05:06

## 2017-02-27 RX ADMIN — Medication 400 MILLIGRAM(S): at 05:06

## 2017-02-27 RX ADMIN — QUETIAPINE FUMARATE 200 MILLIGRAM(S): 200 TABLET, FILM COATED ORAL at 05:06

## 2017-02-27 RX ADMIN — Medication 400 MILLIGRAM(S): at 06:06

## 2017-02-27 RX ADMIN — Medication 1 MILLIGRAM(S): at 12:21

## 2017-02-27 RX ADMIN — Medication 500 MILLIGRAM(S): at 05:06

## 2017-02-27 RX ADMIN — Medication 1 APPLICATION(S): at 05:06

## 2017-02-27 RX ADMIN — LOSARTAN POTASSIUM 50 MILLIGRAM(S): 100 TABLET, FILM COATED ORAL at 05:06

## 2017-02-27 RX ADMIN — Medication 500 MILLIGRAM(S): at 12:19

## 2017-02-27 RX ADMIN — GABAPENTIN 300 MILLIGRAM(S): 400 CAPSULE ORAL at 15:16

## 2017-02-27 RX ADMIN — OXYCODONE HYDROCHLORIDE 10 MILLIGRAM(S): 5 TABLET ORAL at 06:06

## 2017-02-27 RX ADMIN — Medication 1 APPLICATION(S): at 15:17

## 2017-02-27 RX ADMIN — Medication 100 MILLIGRAM(S): at 15:14

## 2017-02-27 RX ADMIN — Medication 400 MILLIGRAM(S): at 15:14

## 2017-02-27 RX ADMIN — Medication 81 MILLIGRAM(S): at 12:19

## 2017-02-27 RX ADMIN — Medication 1 APPLICATION(S): at 05:05

## 2017-02-27 RX ADMIN — Medication 100 MILLIGRAM(S): at 12:19

## 2017-02-27 RX ADMIN — CLINDAMYCIN PHOSPHATE GEL USP, 1% 1 APPLICATION(S): 10 GEL TOPICAL at 05:06

## 2017-02-27 NOTE — DISCHARGE NOTE ADULT - CARE PLAN
Principal Discharge DX:	Tracheal stenosis  Goal:	s/p Bronchoscopy. Goal is improved breathing  Instructions for follow-up, activity and diet:	walk 4-5 x per day. No physical restrictions. Take all medications as prescribed. See Dr. Blackman in the office on March 14th at 1:30pm. Call with any questions.  Secondary Diagnosis:	AA (alcohol abuse)  Instructions for follow-up, activity and diet:	Follow up with [facility] Psychiatry as an outpatient. Call to make an apt. [phone] Principal Discharge DX:	Tracheal stenosis  Goal:	s/p Bronchoscopy. Goal is improved breathing  Instructions for follow-up, activity and diet:	walk 4-5 x per day. No physical restrictions. Take all medications as prescribed. See Dr. Blackman in the office on March 14th at 1:30pm. Call with any questions.  Secondary Diagnosis:	AA (alcohol abuse)  Instructions for follow-up, activity and diet:	Follow up with [facility] Psychiatry as an outpatient. Call to make an apt. [phone]

## 2017-02-27 NOTE — DISCHARGE NOTE ADULT - NS AS ACTIVITY OBS
Return to Work/School allowed/Showering allowed/Walking-Outdoors allowed/Stairs allowed/Driving allowed/Sex allowed/Walking-Indoors allowed

## 2017-02-27 NOTE — DISCHARGE NOTE ADULT - HOSPITAL COURSE
: 55 yo male w/ PMHx EtOH abuse, DM, HTN, HLD, and tracheal stenosis.  Recently discharged to follow up as outpatient for possible tracheal resection, pt did not f/u and presented  to ED 2/21 with identical complaints. Admitted for observation vs. treatment. CT scan done shows some improved stenosis. Seen by Derm for folliculitis. Followed by Psych for med management. Optimized as per Medicine consult. Seen by Pulm for ?need for steroids. 2/25-Left AC phlebitis, started on Keflex and warm compresses. Today 2/27 pt had Bronchoscopy with Dr. Blackman. NO other intevention done. Pt. w grade 2-3 tracheal stenosis. Pt. states breathing improved. Pt. cleared for discharge to home with outpt fu in 2 weeks. Apt. made for pt.

## 2017-02-27 NOTE — DISCHARGE NOTE ADULT - PROVIDER TOKENS
FREE:[LAST:[Dr. Blackman],PHONE:[phone]],FAX:[(   )    -],ADDRESS:[Oncology Building at [facility].]]

## 2017-02-27 NOTE — DISCHARGE NOTE ADULT - PLAN OF CARE
s/p Bronchoscopy. Goal is improved breathing walk 4-5 x per day. No physical restrictions. Take all medications as prescribed. See Dr. Blackman in the office on March 14th at 1:30pm. Call with any questions. Follow up with [facility] Psychiatry as an outpatient. Call to make an apt. [phone]

## 2017-02-27 NOTE — DISCHARGE NOTE ADULT - PATIENT PORTAL LINK FT
“You can access the Follow[employer] Patient Portal, offered by [facility], by registering with the following website: [email or web address]”

## 2017-02-27 NOTE — DISCHARGE NOTE ADULT - MEDICATION SUMMARY - MEDICATIONS TO TAKE
I will START or STAY ON the medications listed below when I get home from the hospital:    aspirin 81 mg oral tablet, chewable  -- 1 tab(s) by mouth once a day  -- Indication: For anti platelet    acetaminophen 325 mg oral tablet  -- 2 tab(s) by mouth every 6 hours, As needed, Mild Pain (1 - 3)  -- Indication: For pain    ibuprofen 400 mg oral tablet  -- 1 tab(s) by mouth every 6 hours, As Needed for pain  -- Indication: For pain    losartan 50 mg oral tablet  -- 1 tab(s) by mouth once a day  -- Indication: For blood pressure    gabapentin 300 mg oral capsule  -- 1 cap(s) by mouth every 8 hours  -- Indication: For pain    metFORMIN 1000 mg oral tablet  -- 1 tab(s) by mouth 2 times a day  -- Check with your doctor before becoming pregnant.  Do not drink alcoholic beverages when taking this medication.  It is very important that you take or use this exactly as directed.  Do not skip doses or discontinue unless directed by your doctor.  Obtain medical advice before taking any non-prescription drugs as some may affect the action of this medication.  Take with food or milk.    -- Indication: For diabetes    atorvastatin 80 mg oral tablet  -- 1 tab(s) by mouth once a day (at bedtime)  -- Indication: For cholesterol    QUEtiapine 200 mg oral tablet  -- 1 tab(s) by mouth 2 times a day  -- Indication: For psych med    Symbicort 160 mcg-4.5 mcg/inh inhalation aerosol  -- 2 puff(s) inhaled 2 times a day  -- Indication: For breathing    cephalexin 500 mg oral capsule  -- 1 cap(s) by mouth 4 times a day x 3 days  -- Indication: For antibiotic    clindamycin 1% topical solution  -- 1 application on skin 2 times a day to back x 1 week  -- Indication: For rash    benzoyl peroxide 10% topical gel  -- 1 application on skin once a day to back x 1 week.  -- Indication: For rash    hydrocortisone 1% topical cream  -- 1 application on skin 2 times a day to buttocks x 1 week  -- Indication: For rash    triamcinolone 0.1% topical cream  -- 1 application on skin 3 times a day to back x 1 week  -- Indication: For rash    witch hazel 50% rectal pad  -- 1 application rectally 2 times a day, As Needed for hemorrhoids  -- Indication: For hemorrhoids    glycerin adult rectal suppository  -- 1 suppository(ies) rectally once a day  -- Indication: For hemorrhoids    docusate sodium 100 mg oral capsule  -- 1 cap(s) by mouth 3 times a day  -- Indication: For constipation    polyethylene glycol 3350 oral powder for reconstitution  -- 17 gram(s) by mouth once a day, As needed, Constipation  -- Indication: For constipation    folic acid 1 mg oral tablet  -- 1 tab(s) by mouth once a day  -- Indication: For vitamin    thiamine 100 mg oral tablet  -- 1 tab(s) by mouth once a day  -- Indication: For vitamin

## 2017-02-27 NOTE — DISCHARGE NOTE ADULT - CARE PROVIDER_API CALL
Dr. Blackman,   Oncology Building at [facility] for apt.  Phone: [phone]  Fax: (   )    -

## 2017-02-27 NOTE — DISCHARGE NOTE ADULT - MEDICATION SUMMARY - MEDICATIONS TO STOP TAKING
I will STOP taking the medications listed below when I get home from the hospital:    predniSONE 20 mg oral tablet  -- 1 tab(s) by mouth once a day as instructed    1/28 40mg  1/29 40mg  1/30 40mg    1/31 30mg  2/1 30mg  2/2 30mg    2/3 20mg  2/4 20mg  2/5 20mg    2/6 10mg  2/7 10mg  2/8 10mg    -- It is very important that you take or use this exactly as directed.  Do not skip doses or discontinue unless directed by your doctor.  Obtain medical advice before taking any non-prescription drugs as some may affect the action of this medication.  Take with food or milk.    DuoNeb 0.5 mg-2.5 mg/3 mL inhalation solution  -- 3 milliliter(s) inhaled every 4 hours, As Needed -for shortness of breath and/or wheezing    NIFEdipine 60 mg oral tablet, extended release  -- 1 tab(s) by mouth once a day

## 2017-02-28 ENCOUNTER — APPOINTMENT (OUTPATIENT)
Dept: THORACIC SURGERY | Facility: CLINIC | Age: 57
End: 2017-02-28

## 2017-03-06 ENCOUNTER — TRANSCRIPTION ENCOUNTER (OUTPATIENT)
Age: 57
End: 2017-03-06

## 2017-03-17 ENCOUNTER — APPOINTMENT (OUTPATIENT)
Dept: THORACIC SURGERY | Facility: CLINIC | Age: 57
End: 2017-03-17

## 2017-04-16 ENCOUNTER — HOSPITAL ENCOUNTER (EMERGENCY)
Dept: HOSPITAL 74 - JERFT | Age: 57
Discharge: HOME | End: 2017-04-16
Payer: COMMERCIAL

## 2017-04-16 VITALS — DIASTOLIC BLOOD PRESSURE: 69 MMHG | TEMPERATURE: 98.1 F | HEART RATE: 119 BPM | SYSTOLIC BLOOD PRESSURE: 126 MMHG

## 2017-04-16 VITALS — BODY MASS INDEX: 33.4 KG/M2

## 2017-04-16 DIAGNOSIS — I10: ICD-10-CM

## 2017-04-16 DIAGNOSIS — J45.909: ICD-10-CM

## 2017-04-16 DIAGNOSIS — F31.9: ICD-10-CM

## 2017-04-16 DIAGNOSIS — L02.224: Primary | ICD-10-CM

## 2017-04-16 DIAGNOSIS — E78.5: ICD-10-CM

## 2017-04-16 DIAGNOSIS — L02.426: ICD-10-CM

## 2017-04-16 DIAGNOSIS — F17.210: ICD-10-CM

## 2017-04-16 DIAGNOSIS — Z86.69: ICD-10-CM

## 2017-04-16 DIAGNOSIS — F10.10: ICD-10-CM

## 2017-04-16 DIAGNOSIS — L02.32: ICD-10-CM

## 2017-04-16 DIAGNOSIS — E11.9: ICD-10-CM

## 2017-04-16 DIAGNOSIS — K64.4: ICD-10-CM

## 2017-04-16 DIAGNOSIS — K21.9: ICD-10-CM

## 2017-04-16 DIAGNOSIS — F20.9: ICD-10-CM

## 2017-04-16 PROCEDURE — 0H9JXZZ DRAINAGE OF LEFT UPPER LEG SKIN, EXTERNAL APPROACH: ICD-10-PCS

## 2017-04-16 PROCEDURE — 3E0F7GC INTRODUCTION OF OTHER THERAPEUTIC SUBSTANCE INTO RESPIRATORY TRACT, VIA NATURAL OR ARTIFICIAL OPENING: ICD-10-PCS

## 2017-04-16 PROCEDURE — 0H9AXZZ DRAINAGE OF INGUINAL SKIN, EXTERNAL APPROACH: ICD-10-PCS

## 2017-04-16 NOTE — PDOC
History of Present Illness





- General


Chief Complaint: Abscess Boil


Stated Complaint: LEG PAIN


Time Seen by Provider: 04/16/17 14:00





- History of Present Illness


Initial Comments: 


04/16/17 14:54


CHIEF COMPLAINT:





HISTORY OF PRESENT ILLNESS:  55 yo M with hx of HTN, NIDDM, HLD, bipolar 

disorder, schizophrenia, ETOH abuse presents to fast track with abscess to 

right groin, right buttock, and L inner thigh. Patient reports having them for 

"over a week" and was given Bactrim by a doctor in rehab, from which he was 

discharged today. Denies fever, nausea, vomiting, diarrhea, but complains of 

hemorrhoids and requests medication.





PAST MEDICAL HISTORY: as per HPI





FAMILY HISTORY: Denies





SOCIAL HISTORY: Current smoker, 30 cigarettes daily.  ETOH abuse.  Denies 

illicit drug use. 





SURGICAL HISTORY: Denies





ALLERGIES: No known drug allergies





REVIEW OF SYSTEMS


General/Constitutional: Denies fever or chills. Denies weakness, weight change.





HEENT: Denies change in vision. Denies ear pain or discharge. Denies sore 

throat.





Cardiovascular: Denies chest pain or shortness of breath.





Respiratory: Denies cough, wheezing, or hemoptysis.





Gastrointestinal: Denies nausea, vomiting, diarrhea or constipation. Denies 

rectal bleeding.





Genitourinary: Denies dysuria, frequency, or change in urination.





Musculoskeletal: Denies joint or muscle swelling or pain. Denies neck or back 

pain.





Skin and breasts: Abscess to right groin, buttock and inner thigh.  Denies rash 

or easy bruising.





PHYSICAL EXAM


General Appearance: Well-appearing, appropriately dressed.  No apparent distress

, no intoxication.





HEENT: EOMI, PERRLA, normal ENT inspection, normal voice, TMs normal, pharynx 

normal.  No conjunctival pallor.  No photophobia, scleral icterus.





Neck: Supple.  Trachea midline. No tenderness, rigidity, carotid bruit, stridor

, lymphadenopathy, or thyromegaly. 





Respiratory/Chest: Lungs CTAB.  No shortness of breath, chest tenderness, 

respiratory distress, accessory muscle use. No crackles, rales, rhonchi, stridor

, wheezing, dullness





Cardiovascular: RRR. S1, S2.  





Gastrointestinal/Abdominal: Normal bowel sounds.  Abdomen soft, non-distended.  

No tenderness or rebound tenderness. No  organomegaly, pulsatile mass, guarding

, hernia, hepatomegaly, splenomegaly.





Lymphatic: No adenopathy, tenderness.





Musculoskeletal/Extremities:  Normal inspection. FROM of all extremities, 

normal capillary refill.  Pelvis Stable.  No CVA tenderness. No tenderness to 

extremities, pedal edema, swelling, erythema or deformity.





Integumentary: 2 cm x 2 cm fluctuant abscess to right mons pubis.  2 cm x 2 cm  

chronic abscess to right buttock. 1cm x 1 cm non-fluctuant abscess to L thigh.  

Appropriate color, dry, warm.  





Neurologic: CNs II-XII intact. Fully oriented, alert.  Appropriate mood/affect. 

Motor strength 5/5.  No appreciable EOM palsy, facial droop or sensory deficit.











Past History





- Past Medical History


Allergies/Adverse Reactions: 


 Allergies











Allergy/AdvReac Type Severity Reaction Status Date / Time


 


No Known Drug Allergies Allergy   Verified 04/16/17 13:29











Home Medications: 


Ambulatory Orders





Trazodone HCl 100 mg PO HS #30 tablet 05/04/16 


Quetiapine Fumarate [Seroquel] 200 tab PO BID #60 tablet 05/09/16 


Fluticasone Prop 0.05% Nasal [Flonase -] 1 spray NS BID #1 spray 05/31/16 


Hydrochlorothiazide [Hctz -] 25 mg PO DAILY #30 tablet 05/31/16 


Folic Acid - 1 mg PO DAILY 04/11/17 


Gabapentin [Neurontin -] 300 mg PO TID 04/11/17 


Metformin HCl [Glucophage -] 1,000 mg PO BID 04/11/17 


Polyethylene Glycol 3350 [Purelax] 17 gm PO DAILY 04/11/17 


Triamcinolone Acet 0.1% Cream [Aristocort] 80 gm TP DAILY 04/11/17 


Gabapentin 300 mg PO TID #90 capsule 04/12/17 


Quetiapine Fumarate [Seroquel -] 200 mg PO BID #60 tab 04/12/17 


Trazodone HCl [Desyrel -] 100 mg PO HS #30 tablet 04/12/17 


Amlodipine Besylate [Norvasc -] 10 mg PO DAILY #30 tablet 04/16/17 


Aspirin [ASA -] 81 mg PO DAILY #30 tab.chew 04/16/17 


Budesonide/Formeterol Fumarate [SYMBICORT 160/4.5mcg -] 1 inh PO BID #1 inhaler 

04/16/17 


Clindamycin [Cleocin -] 300 mg PO Q6HPO #40 capsule 04/16/17 


Docusate Sodium [Colace -] 100 mg PO TID #30 tab 04/16/17 


Hydrocortisone [Preparation H] 1 applic TP QID PRN #1 tube 04/16/17 


Ibuprofen 400 mg PO QID PRN #40 tablet 04/16/17 


Lisinopril [Prinivil] 10 mg PO DAILY #30 tab 04/16/17 


Metformin HCl [Glucophage -] 500 mg PO BID@0700,1630 #60 tablet 04/16/17 


Rosuvastatin Calcium [Crestor] 20 mg PO HS #30 tab 04/16/17 


Sulfamethoxazole/Trimethoprim [Bactrim DS -] 1 each PO BID #10 tablet 04/16/17 








Anemia: No


Asthma: Yes (MDI)


Cancer: No


Cardiac Disorders: No


CVA: No


COPD: No


CHF: No


Dementia: No


Diabetes: Yes (NIDDM)


GI Disorders: No (acid reflux)


 Disorders: No


HTN: Yes (ON MED)


Hypercholesterolemia: Yes


Kidney Stones: No


Liver Disease: No


Suicide Attempt (Hx): No (DENIES "ONLY  WITH THE LIQUOR BOTTLE".)


Seizures: Yes (alcohol related-last episode was in 9/2016)


Thyroid Disease: No





- Surgical History


Abdominal Surgery: No


Appendectomy: Yes (at age 13)


Cardiac Surgery: No


Cholecystectomy: No


Lung Surgery: No


Neurologic Surgery: No


Orthopedic Surgery: Yes





- Reproductive History


Testicular Surgery: No





- Psycho/Social/Smoking Cessation Hx


Anxiety: Yes


Suicidal Ideation: No


Smoking History: Current every day smoker


Have you smoked in the past 12 months: Yes


Number of Cigarettes Smoked Daily: 30


Cigars Per Day: 0


Information on smoking cessation initiated: No


'Breaking Loose' booklet given: 04/11/17


Hx Alcohol Use: Yes (daily)


Drug/Substance Use Hx: No


Substance Use Type: Alcohol


[facility]





*Physical Exam





- Vital Signs


 Last Vital Signs











Temp Pulse Resp BP Pulse Ox


 


 98.1 F   119 H  20   126/69   95 


 


 04/16/17 13:25  04/16/17 13:25  04/16/17 13:25  04/16/17 13:25  04/16/17 13:25














ED Treatment Course





- RADIOLOGY


Radiology Studies Ordered: 














 Category Date Time Status


 


 PELVIS CT WITHOUT CONTRAST [CT] Stat CT Scan  04/16/17 14:39 Ordered














Medical Decision Making





- Medical Decision Making


04/16/17 15:05


55 yo M with hx of HTN, NIDDM, HLD, bipolar disorder, schizophrenia, ETOH abuse 

presents to fast track with abscess to right groin, right buttock, and right 

inner thigh.





-I&D of abscess to right mons pubis.  (see procedure note).  


-Needle aspiration to R inner thigh, no drainage appreciated. 





Other abscesses with no fluctuance. 





External hemorrhoids.





Preparation H. 














*DC/Admit/Observation/Transfer


Diagnosis at time of Disposition: 


 Furuncle of buttock, Furuncle of groin





- Discharge Dispostion


Disposition: HOME


Condition at time of disposition: Stable


Admit: No





- Prescriptions


Prescriptions: 


Clindamycin [Cleocin -] 300 mg PO Q6HPO #40 capsule


Ibuprofen 400 mg PO QID PRN #40 tablet


 PRN Reason: Pain


Hydrocortisone [Preparation H] 1 applic TP QID PRN #1 tube


 PRN Reason: Hemorrhoids


Budesonide/Formeterol Fumarate [SYMBICORT 160/4.5mcg -] 1 inh PO BID #1 inhaler





- Referrals


Referrals: 


Kevin Gupta MD [Staff Physician] - 


Cierra Wilson MD [Staff Physician] - 





- Patient Instructions


Printed Discharge Instructions:  DI for Incision and Drainage of a Skin Abscess

, DI for Anal Abscess, Hemorrhoids


Additional Instructions: 


Please keep the area of drainage clean and dry for the next 24-48 hours.  As 

discussed, you must follow up in 2 days for a wound recheck and/or packing 

removal.  You must establish care with a primary care doctor for continued 

management of your asthma, diabetes, high blood pressure, high cholesterol, 

bipolar disorder, and schizophrenia.  Also as discussed, if you experience any 

fever, chills, nausea, vomiting, diarrhea, chest pain, shortness of breath, or 

any new or worsening symptoms, please return to the ER.

## 2017-04-16 NOTE — PDOC
*Physical Exam





- Vital Signs


 Last Vital Signs











Temp Pulse Resp BP Pulse Ox


 


 98.1 F   119 H  20   126/69   95 


 


 04/16/17 13:25  04/16/17 13:25  04/16/17 13:25  04/16/17 13:25  04/16/17 13:25














Medical Decision Making





- Medical Decision Making





04/16/17 14:56


Called to bedside to evaluate pt for multiple skin abscesses. He has one to the 

mons pubis, one to the L inner thigh, both can be I&D'd. Also noted to have 

small chronic abscess to L buttock that appears chronic. Will defer, as it is 

not indurated and not tense or tender.





*DC/Admit/Observation/Transfer


Diagnosis at time of Disposition: 


 Furuncle of buttock, Furuncle of groin





- Discharge Dispostion


Disposition: HOME


Condition at time of disposition: Stable





- Prescriptions


Prescriptions: 


Clindamycin [Cleocin -] 300 mg PO Q6HPO #40 capsule


Ibuprofen 400 mg PO QID PRN #40 tablet


 PRN Reason: Pain


Hydrocortisone [Preparation H] 1 applic TP QID PRN #1 tube


 PRN Reason: Hemorrhoids


Budesonide/Formeterol Fumarate [SYMBICORT 160/4.5mcg -] 1 inh PO BID #1 inhaler





- Referrals


Referrals: 


Kevin Gupta MD [Staff Physician] - 


Cierra Wilson MD [Staff Physician] - 





- Patient Instructions


Printed Discharge Instructions:  Hemorrhoids, DI for Anal Abscess, DI for 

Incision and Drainage of a Skin Abscess


Additional Instructions: 


Please keep the area of drainage clean and dry for the next 24-48 hours.  As 

discussed, you must follow up in 2 days for a wound recheck and/or packing 

removal.  You must establish care with a primary care doctor for continued 

management of your asthma, diabetes, high blood pressure, high cholesterol, 

bipolar disorder, and schizophrenia.  Also as discussed, if you experience any 

fever, chills, nausea, vomiting, diarrhea, chest pain, shortness of breath, or 

any new or worsening symptoms, please return to the ER.

## 2017-04-17 ENCOUNTER — HOSPITAL ENCOUNTER (INPATIENT)
Dept: HOSPITAL 74 - YASAS | Age: 57
LOS: 28 days | Discharge: HOME | DRG: 772 | End: 2017-05-15
Attending: PSYCHIATRY & NEUROLOGY | Admitting: PSYCHIATRY & NEUROLOGY
Payer: COMMERCIAL

## 2017-04-17 VITALS — BODY MASS INDEX: 34.9 KG/M2

## 2017-04-17 DIAGNOSIS — F25.1: ICD-10-CM

## 2017-04-17 DIAGNOSIS — E66.09: ICD-10-CM

## 2017-04-17 DIAGNOSIS — Z96.89: ICD-10-CM

## 2017-04-17 DIAGNOSIS — F17.213: ICD-10-CM

## 2017-04-17 DIAGNOSIS — I10: ICD-10-CM

## 2017-04-17 DIAGNOSIS — Z97.0: ICD-10-CM

## 2017-04-17 DIAGNOSIS — F10.230: Primary | ICD-10-CM

## 2017-04-17 DIAGNOSIS — E11.9: ICD-10-CM

## 2017-04-17 RX ADMIN — IBUPROFEN PRN MG: 400 TABLET, FILM COATED ORAL at 15:37

## 2017-04-17 RX ADMIN — CLINDAMYCIN HYDROCHLORIDE SCH MG: 150 CAPSULE ORAL at 23:00

## 2017-04-17 RX ADMIN — Medication SCH MG: at 21:29

## 2017-04-17 RX ADMIN — FLUTICASONE PROPIONATE SCH SPRAY: 50 SPRAY, METERED NASAL at 21:33

## 2017-04-17 RX ADMIN — CLINDAMYCIN HYDROCHLORIDE SCH MG: 150 CAPSULE ORAL at 17:12

## 2017-04-17 RX ADMIN — ROSUVASTATIN CALCIUM SCH MG: 20 TABLET, FILM COATED ORAL at 21:29

## 2017-04-17 RX ADMIN — NICOTINE SCH MG: 21 PATCH TRANSDERMAL at 15:37

## 2017-04-17 RX ADMIN — ACETAMINOPHEN PRN MG: 325 TABLET ORAL at 20:35

## 2017-04-17 RX ADMIN — DOCUSATE SODIUM SCH MG: 100 CAPSULE, LIQUID FILLED ORAL at 15:37

## 2017-04-17 RX ADMIN — DOCUSATE SODIUM SCH MG: 100 CAPSULE, LIQUID FILLED ORAL at 21:29

## 2017-04-17 RX ADMIN — GABAPENTIN SCH MG: 300 CAPSULE ORAL at 15:37

## 2017-04-17 RX ADMIN — QUETIAPINE FUMARATE SCH MG: 100 TABLET ORAL at 21:32

## 2017-04-17 RX ADMIN — BUDESONIDE AND FORMOTEROL FUMARATE DIHYDRATE SCH PUFF: 160; 4.5 AEROSOL RESPIRATORY (INHALATION) at 22:11

## 2017-04-17 RX ADMIN — TRAZODONE HYDROCHLORIDE SCH MG: 100 TABLET ORAL at 21:31

## 2017-04-17 RX ADMIN — METFORMIN HYDROCHLORIDE SCH MG: 500 TABLET ORAL at 17:11

## 2017-04-17 RX ADMIN — GABAPENTIN SCH MG: 300 CAPSULE ORAL at 21:29

## 2017-04-17 NOTE — HP
[facility] MD Rehab Assess/Revision





- Admission History


Admitted to Rehab from: [facility]


Date of Admission to Rehab: 04/17/17





- Vital signs


Vital Signs: 


 Vital Signs











 Period  Temp  Pulse  Resp  BP Sys/Yeh  Pulse Ox


 


 Last 24 Hr  96 F  100  18  140/86  














- Findings


Detox History & Physical reviewed: Yes


Concur with findings: Yes


Comments/Additional Findings: for rehab as protocol,patient was admitted to 

[facility] detox from 04/11/17 to.  04/16/17

## 2017-04-17 NOTE — HP
Psychiatrist Admission





- Data


Date of interview: 04/17/17


Admission source: [facility]


Identifying data: This is one of the several inpatient rehabilitation 

admissions for this 56 year old single unemployed and domiciled black male, 

residing with his c/l and his 3 year old daughter,  supported by SSI/SSD.


Medical History: Obesity, DM, HTN and right prosthetic eye (congenital 

retinopathy) since age 16, history of appendectomy and cervical surgery in 

2007. Furuncle of groin, Smokes cigarettes 1,5 PPD.


Psychiatric History: Patient carries a diagnosis of  Schizoaffective disorder,  

several psychiatric hospitalizations  at [facility]. 

Non-compliant with medications and aftercare, reports most of the time he 

obtains his medications visiting ERs, stated he currently on Seroquel 200 po 

BID and Trazodone 100 mg po hs.


Physical/Sexual Abuse/Trauma History: Denies history of sexual, physical and 

verbal abuse.


Vital Signs: 


 Vital Signs - 24 hr











  04/17/17





  12:40


 


Temperature 96 F L


 


Pulse Rate 100 H


 


Respiratory 18





Rate 


 


Blood Pressure 140/86











Allergies/Adverse Reactions: 


 Allergies











Allergy/AdvReac Type Severity Reaction Status Date / Time


 


No Known Drug Allergies Allergy   Verified 04/17/17 13:23











Date of last physical exam: 04/12/17


Concur with the findings of this exam: Yes





- Substance Abuse/Tx History


Hx Alcohol Use: Yes


Hx Substance Use: Yes


Substance Use Type: Alcohol (drinks 2 cases of beer, wine, liquor.)


Hx Substance Use Treatment: Yes





- Admission Criteria


Previous failed treatment: Yes


Poor recovery environment: Yes


Comorbidities: Yes


Lacks judgement: Yes





Mental Status Exam





- Mental Status Exam


Alert and Oriented to: Time, Place, Person


Cognitive Function: Good


Patient Appearance: Well Groomed


Mood: Hopeful


Affect: Appropriate, Mood Congruent


Patient Behavior: Appropriate, Cooperative


Speech Pattern: Clear, Appropriate


Voice Loudness: Normal


Thought Process: Goal Oriented


Thought Disorder: Not Present


Hallucinations: Denies


Suicidal Ideation: Denies


Homicidal Ideation: Denies


Insight/Judgement: Fair


Sleep: Fair


Appetite: Good


Muscle strength/Tone: Normal


Gait/Station: Normal





Psychiatric Findings





- Problem List (Axis 1, 2,3)


(1) Furuncle of groin


Current Visit: No   Status: Acute





(2) Nicotine dependence


Current Visit: No   Status: Acute   Qualifiers: 


     Nicotine product type: cigarettes     Substance use status: in withdrawal 

       Qualified Code(s): F17.213 - Nicotine dependence, cigarettes, with 

withdrawal  





(3) Schizoaffective disorder


Current Visit: No   Status: Chronic   Qualifiers: 


     Schizoaffective disorder type: depressive        Qualified Code(s): F25.1 

- Schizoaffective disorder, depressive type  








- Initial Treatment Plan


Initial Treatment Plan: will continue his current medications, monitor progress 

as needed.

## 2017-04-17 NOTE — HP
[facility] MD Rehab Assess/Revision





- Vital signs


Vital Signs: 


 Vital Signs











 Period  Temp  Pulse  Resp  BP Sys/Yeh  Pulse Ox


 


 Last 24 Hr  96 F  100  18  140/86

## 2017-04-18 RX ADMIN — PHENOL PRN EACH: 14.5 LOZENGE ORAL at 23:10

## 2017-04-18 RX ADMIN — PHENOL PRN EACH: 14.5 LOZENGE ORAL at 03:45

## 2017-04-18 RX ADMIN — TRAZODONE HYDROCHLORIDE SCH MG: 100 TABLET ORAL at 21:38

## 2017-04-18 RX ADMIN — AMLODIPINE BESYLATE SCH MG: 10 TABLET ORAL at 09:05

## 2017-04-18 RX ADMIN — ROSUVASTATIN CALCIUM SCH MG: 20 TABLET, FILM COATED ORAL at 21:38

## 2017-04-18 RX ADMIN — ACETAMINOPHEN PRN MG: 325 TABLET ORAL at 23:09

## 2017-04-18 RX ADMIN — QUETIAPINE FUMARATE SCH MG: 100 TABLET ORAL at 21:34

## 2017-04-18 RX ADMIN — CLINDAMYCIN HYDROCHLORIDE SCH MG: 150 CAPSULE ORAL at 17:06

## 2017-04-18 RX ADMIN — METFORMIN HYDROCHLORIDE SCH MG: 500 TABLET ORAL at 17:05

## 2017-04-18 RX ADMIN — DOCUSATE SODIUM SCH MG: 100 CAPSULE, LIQUID FILLED ORAL at 14:23

## 2017-04-18 RX ADMIN — HYDROCORTISONE SCH APPLIC: 25 CREAM TOPICAL at 21:37

## 2017-04-18 RX ADMIN — METFORMIN HYDROCHLORIDE SCH MG: 500 TABLET ORAL at 06:19

## 2017-04-18 RX ADMIN — ASPIRIN 81 MG SCH MG: 81 TABLET ORAL at 09:05

## 2017-04-18 RX ADMIN — DOCUSATE SODIUM SCH MG: 100 CAPSULE, LIQUID FILLED ORAL at 21:34

## 2017-04-18 RX ADMIN — NICOTINE SCH MG: 21 PATCH TRANSDERMAL at 09:17

## 2017-04-18 RX ADMIN — GABAPENTIN SCH MG: 300 CAPSULE ORAL at 06:19

## 2017-04-18 RX ADMIN — ACETAMINOPHEN PRN MG: 325 TABLET ORAL at 10:58

## 2017-04-18 RX ADMIN — CLINDAMYCIN HYDROCHLORIDE SCH MG: 150 CAPSULE ORAL at 06:19

## 2017-04-18 RX ADMIN — CLINDAMYCIN HYDROCHLORIDE SCH MG: 150 CAPSULE ORAL at 11:57

## 2017-04-18 RX ADMIN — Medication SCH TAB: at 09:09

## 2017-04-18 RX ADMIN — Medication SCH MG: at 21:36

## 2017-04-18 RX ADMIN — LISINOPRIL SCH MG: 10 TABLET ORAL at 09:49

## 2017-04-18 RX ADMIN — CLINDAMYCIN HYDROCHLORIDE SCH MG: 150 CAPSULE ORAL at 23:54

## 2017-04-18 RX ADMIN — ACETAMINOPHEN PRN MG: 325 TABLET ORAL at 03:43

## 2017-04-18 RX ADMIN — BUDESONIDE AND FORMOTEROL FUMARATE DIHYDRATE SCH PUFF: 160; 4.5 AEROSOL RESPIRATORY (INHALATION) at 21:34

## 2017-04-18 RX ADMIN — BUDESONIDE AND FORMOTEROL FUMARATE DIHYDRATE SCH PUFF: 160; 4.5 AEROSOL RESPIRATORY (INHALATION) at 09:10

## 2017-04-18 RX ADMIN — GABAPENTIN SCH MG: 300 CAPSULE ORAL at 21:34

## 2017-04-18 RX ADMIN — DOCUSATE SODIUM SCH MG: 100 CAPSULE, LIQUID FILLED ORAL at 06:19

## 2017-04-18 RX ADMIN — FLUTICASONE PROPIONATE SCH SPRAY: 50 SPRAY, METERED NASAL at 21:35

## 2017-04-18 RX ADMIN — Medication SCH GM: at 21:36

## 2017-04-18 RX ADMIN — GABAPENTIN SCH MG: 300 CAPSULE ORAL at 14:23

## 2017-04-18 RX ADMIN — FLUTICASONE PROPIONATE SCH SPRAY: 50 SPRAY, METERED NASAL at 09:08

## 2017-04-18 RX ADMIN — QUETIAPINE FUMARATE SCH MG: 100 TABLET ORAL at 09:15

## 2017-04-19 RX ADMIN — CLINDAMYCIN HYDROCHLORIDE SCH MG: 150 CAPSULE ORAL at 12:01

## 2017-04-19 RX ADMIN — DOCUSATE SODIUM SCH MG: 100 CAPSULE, LIQUID FILLED ORAL at 21:36

## 2017-04-19 RX ADMIN — BACITRACIN ZINC SCH GM: 500 OINTMENT TOPICAL at 21:37

## 2017-04-19 RX ADMIN — CLINDAMYCIN HYDROCHLORIDE SCH MG: 150 CAPSULE ORAL at 17:07

## 2017-04-19 RX ADMIN — Medication SCH TAB: at 09:06

## 2017-04-19 RX ADMIN — CLINDAMYCIN HYDROCHLORIDE SCH MG: 150 CAPSULE ORAL at 23:23

## 2017-04-19 RX ADMIN — ROSUVASTATIN CALCIUM SCH MG: 20 TABLET, FILM COATED ORAL at 21:36

## 2017-04-19 RX ADMIN — FLUTICASONE PROPIONATE SCH SPRAY: 50 SPRAY, METERED NASAL at 21:35

## 2017-04-19 RX ADMIN — Medication SCH GM: at 09:06

## 2017-04-19 RX ADMIN — METFORMIN HYDROCHLORIDE SCH MG: 500 TABLET ORAL at 06:34

## 2017-04-19 RX ADMIN — FLUTICASONE PROPIONATE SCH SPRAY: 50 SPRAY, METERED NASAL at 09:07

## 2017-04-19 RX ADMIN — BUDESONIDE AND FORMOTEROL FUMARATE DIHYDRATE SCH PUFF: 160; 4.5 AEROSOL RESPIRATORY (INHALATION) at 09:06

## 2017-04-19 RX ADMIN — AMLODIPINE BESYLATE SCH MG: 10 TABLET ORAL at 09:06

## 2017-04-19 RX ADMIN — DOCUSATE SODIUM SCH MG: 100 CAPSULE, LIQUID FILLED ORAL at 06:35

## 2017-04-19 RX ADMIN — GABAPENTIN SCH MG: 300 CAPSULE ORAL at 21:37

## 2017-04-19 RX ADMIN — NICOTINE SCH MG: 21 PATCH TRANSDERMAL at 09:09

## 2017-04-19 RX ADMIN — ACETAMINOPHEN PRN MG: 325 TABLET ORAL at 14:22

## 2017-04-19 RX ADMIN — QUETIAPINE FUMARATE SCH MG: 100 TABLET ORAL at 21:36

## 2017-04-19 RX ADMIN — QUETIAPINE FUMARATE SCH MG: 100 TABLET ORAL at 09:06

## 2017-04-19 RX ADMIN — HYDROCORTISONE SCH APPLIC: 25 CREAM TOPICAL at 21:36

## 2017-04-19 RX ADMIN — GABAPENTIN SCH MG: 300 CAPSULE ORAL at 14:21

## 2017-04-19 RX ADMIN — ASPIRIN 81 MG SCH MG: 81 TABLET ORAL at 09:06

## 2017-04-19 RX ADMIN — METFORMIN HYDROCHLORIDE SCH MG: 500 TABLET ORAL at 17:07

## 2017-04-19 RX ADMIN — TRAZODONE HYDROCHLORIDE SCH MG: 100 TABLET ORAL at 21:37

## 2017-04-19 RX ADMIN — BUDESONIDE AND FORMOTEROL FUMARATE DIHYDRATE SCH PUFF: 160; 4.5 AEROSOL RESPIRATORY (INHALATION) at 21:36

## 2017-04-19 RX ADMIN — GABAPENTIN SCH MG: 300 CAPSULE ORAL at 06:34

## 2017-04-19 RX ADMIN — DOCUSATE SODIUM SCH MG: 100 CAPSULE, LIQUID FILLED ORAL at 14:21

## 2017-04-19 RX ADMIN — CLINDAMYCIN HYDROCHLORIDE SCH MG: 150 CAPSULE ORAL at 06:35

## 2017-04-19 RX ADMIN — Medication SCH GM: at 21:36

## 2017-04-19 RX ADMIN — HYDROCORTISONE SCH APPLIC: 25 CREAM TOPICAL at 09:10

## 2017-04-19 RX ADMIN — LISINOPRIL SCH MG: 10 TABLET ORAL at 09:07

## 2017-04-19 RX ADMIN — Medication SCH MG: at 21:36

## 2017-04-20 RX ADMIN — FLUTICASONE PROPIONATE SCH SPRAY: 50 SPRAY, METERED NASAL at 10:02

## 2017-04-20 RX ADMIN — TRAZODONE HYDROCHLORIDE SCH MG: 100 TABLET ORAL at 21:43

## 2017-04-20 RX ADMIN — HYDROCORTISONE SCH APPLIC: 25 CREAM TOPICAL at 21:42

## 2017-04-20 RX ADMIN — GABAPENTIN SCH MG: 300 CAPSULE ORAL at 06:17

## 2017-04-20 RX ADMIN — NICOTINE SCH MG: 21 PATCH TRANSDERMAL at 10:02

## 2017-04-20 RX ADMIN — Medication SCH GM: at 10:01

## 2017-04-20 RX ADMIN — DOCUSATE SODIUM SCH MG: 100 CAPSULE, LIQUID FILLED ORAL at 13:10

## 2017-04-20 RX ADMIN — QUETIAPINE FUMARATE SCH MG: 100 TABLET ORAL at 09:59

## 2017-04-20 RX ADMIN — ASPIRIN 81 MG SCH MG: 81 TABLET ORAL at 09:59

## 2017-04-20 RX ADMIN — METFORMIN HYDROCHLORIDE SCH MG: 500 TABLET ORAL at 06:17

## 2017-04-20 RX ADMIN — LISINOPRIL SCH MG: 10 TABLET ORAL at 09:59

## 2017-04-20 RX ADMIN — BUDESONIDE AND FORMOTEROL FUMARATE DIHYDRATE SCH PUFF: 160; 4.5 AEROSOL RESPIRATORY (INHALATION) at 09:59

## 2017-04-20 RX ADMIN — Medication SCH TAB: at 09:59

## 2017-04-20 RX ADMIN — CLINDAMYCIN HYDROCHLORIDE SCH MG: 150 CAPSULE ORAL at 17:04

## 2017-04-20 RX ADMIN — CLINDAMYCIN HYDROCHLORIDE SCH MG: 150 CAPSULE ORAL at 06:17

## 2017-04-20 RX ADMIN — Medication SCH GM: at 21:43

## 2017-04-20 RX ADMIN — AMLODIPINE BESYLATE SCH MG: 10 TABLET ORAL at 09:59

## 2017-04-20 RX ADMIN — GABAPENTIN SCH MG: 300 CAPSULE ORAL at 13:12

## 2017-04-20 RX ADMIN — QUETIAPINE FUMARATE SCH MG: 100 TABLET ORAL at 21:43

## 2017-04-20 RX ADMIN — CLINDAMYCIN HYDROCHLORIDE SCH MG: 150 CAPSULE ORAL at 13:10

## 2017-04-20 RX ADMIN — GABAPENTIN SCH MG: 300 CAPSULE ORAL at 21:43

## 2017-04-20 RX ADMIN — FLUTICASONE PROPIONATE SCH SPRAY: 50 SPRAY, METERED NASAL at 21:42

## 2017-04-20 RX ADMIN — ALUMINUM HYDROXIDE, MAGNESIUM HYDROXIDE, AND SIMETHICONE PRN ML: 200; 200; 20 SUSPENSION ORAL at 23:14

## 2017-04-20 RX ADMIN — DOCUSATE SODIUM SCH MG: 100 CAPSULE, LIQUID FILLED ORAL at 06:17

## 2017-04-20 RX ADMIN — BACITRACIN ZINC SCH GM: 500 OINTMENT TOPICAL at 10:05

## 2017-04-20 RX ADMIN — Medication SCH MG: at 21:43

## 2017-04-20 RX ADMIN — ROSUVASTATIN CALCIUM SCH MG: 20 TABLET, FILM COATED ORAL at 21:43

## 2017-04-20 RX ADMIN — HYDROCORTISONE SCH APPLIC: 25 CREAM TOPICAL at 09:59

## 2017-04-20 RX ADMIN — BUDESONIDE AND FORMOTEROL FUMARATE DIHYDRATE SCH PUFF: 160; 4.5 AEROSOL RESPIRATORY (INHALATION) at 21:42

## 2017-04-20 RX ADMIN — CLINDAMYCIN HYDROCHLORIDE SCH MG: 150 CAPSULE ORAL at 23:02

## 2017-04-20 RX ADMIN — METFORMIN HYDROCHLORIDE SCH MG: 500 TABLET ORAL at 17:04

## 2017-04-20 RX ADMIN — BACITRACIN ZINC SCH GM: 500 OINTMENT TOPICAL at 21:42

## 2017-04-20 RX ADMIN — DOCUSATE SODIUM SCH MG: 100 CAPSULE, LIQUID FILLED ORAL at 21:43

## 2017-04-21 RX ADMIN — NICOTINE SCH MG: 21 PATCH TRANSDERMAL at 09:57

## 2017-04-21 RX ADMIN — GABAPENTIN SCH MG: 300 CAPSULE ORAL at 21:37

## 2017-04-21 RX ADMIN — BACITRACIN ZINC SCH GM: 500 OINTMENT TOPICAL at 09:54

## 2017-04-21 RX ADMIN — CLINDAMYCIN HYDROCHLORIDE SCH MG: 150 CAPSULE ORAL at 11:54

## 2017-04-21 RX ADMIN — Medication SCH GM: at 09:58

## 2017-04-21 RX ADMIN — QUETIAPINE FUMARATE SCH MG: 100 TABLET ORAL at 21:37

## 2017-04-21 RX ADMIN — DOCUSATE SODIUM SCH MG: 100 CAPSULE, LIQUID FILLED ORAL at 13:07

## 2017-04-21 RX ADMIN — ASPIRIN 81 MG SCH MG: 81 TABLET ORAL at 09:55

## 2017-04-21 RX ADMIN — FLUTICASONE PROPIONATE SCH SPRAY: 50 SPRAY, METERED NASAL at 09:56

## 2017-04-21 RX ADMIN — GABAPENTIN SCH MG: 300 CAPSULE ORAL at 13:07

## 2017-04-21 RX ADMIN — QUETIAPINE FUMARATE SCH MG: 100 TABLET ORAL at 09:55

## 2017-04-21 RX ADMIN — GUAIFENESIN AND DEXTROMETHORPHAN PRN ML: 100; 10 SYRUP ORAL at 21:39

## 2017-04-21 RX ADMIN — CLINDAMYCIN HYDROCHLORIDE SCH MG: 150 CAPSULE ORAL at 23:27

## 2017-04-21 RX ADMIN — DOCUSATE SODIUM SCH MG: 100 CAPSULE, LIQUID FILLED ORAL at 21:37

## 2017-04-21 RX ADMIN — METFORMIN HYDROCHLORIDE SCH MG: 500 TABLET ORAL at 17:04

## 2017-04-21 RX ADMIN — CLINDAMYCIN HYDROCHLORIDE SCH MG: 150 CAPSULE ORAL at 06:26

## 2017-04-21 RX ADMIN — METFORMIN HYDROCHLORIDE SCH MG: 500 TABLET ORAL at 06:26

## 2017-04-21 RX ADMIN — TRAZODONE HYDROCHLORIDE SCH MG: 100 TABLET ORAL at 21:37

## 2017-04-21 RX ADMIN — CLINDAMYCIN HYDROCHLORIDE SCH MG: 150 CAPSULE ORAL at 17:05

## 2017-04-21 RX ADMIN — LISINOPRIL SCH MG: 10 TABLET ORAL at 09:55

## 2017-04-21 RX ADMIN — Medication SCH GM: at 21:36

## 2017-04-21 RX ADMIN — BUDESONIDE AND FORMOTEROL FUMARATE DIHYDRATE SCH PUFF: 160; 4.5 AEROSOL RESPIRATORY (INHALATION) at 21:36

## 2017-04-21 RX ADMIN — ROSUVASTATIN CALCIUM SCH MG: 20 TABLET, FILM COATED ORAL at 21:38

## 2017-04-21 RX ADMIN — DOCUSATE SODIUM SCH MG: 100 CAPSULE, LIQUID FILLED ORAL at 06:26

## 2017-04-21 RX ADMIN — GABAPENTIN SCH MG: 300 CAPSULE ORAL at 06:26

## 2017-04-21 RX ADMIN — HYDROCORTISONE SCH APPLIC: 25 CREAM TOPICAL at 09:55

## 2017-04-21 RX ADMIN — ALBUTEROL SULFATE PRN PUFF: 90 AEROSOL, METERED RESPIRATORY (INHALATION) at 16:14

## 2017-04-21 RX ADMIN — INSULIN ASPART SCH UNIT: 100 INJECTION, SOLUTION INTRAVENOUS; SUBCUTANEOUS at 17:04

## 2017-04-21 RX ADMIN — BUDESONIDE AND FORMOTEROL FUMARATE DIHYDRATE SCH PUFF: 160; 4.5 AEROSOL RESPIRATORY (INHALATION) at 09:54

## 2017-04-21 RX ADMIN — GUAIFENESIN AND DEXTROMETHORPHAN PRN ML: 100; 10 SYRUP ORAL at 12:00

## 2017-04-21 RX ADMIN — FLUTICASONE PROPIONATE SCH SPRAY: 50 SPRAY, METERED NASAL at 21:36

## 2017-04-21 RX ADMIN — BACITRACIN ZINC SCH GM: 500 OINTMENT TOPICAL at 21:37

## 2017-04-21 RX ADMIN — AMLODIPINE BESYLATE SCH MG: 10 TABLET ORAL at 09:55

## 2017-04-21 RX ADMIN — Medication SCH TAB: at 09:56

## 2017-04-21 RX ADMIN — HYDROCORTISONE SCH APPLIC: 25 CREAM TOPICAL at 21:36

## 2017-04-21 RX ADMIN — Medication SCH MG: at 21:37

## 2017-04-22 RX ADMIN — ASPIRIN 81 MG SCH MG: 81 TABLET ORAL at 09:54

## 2017-04-22 RX ADMIN — BACITRACIN ZINC SCH GM: 500 OINTMENT TOPICAL at 21:02

## 2017-04-22 RX ADMIN — DOCUSATE SODIUM SCH MG: 100 CAPSULE, LIQUID FILLED ORAL at 13:58

## 2017-04-22 RX ADMIN — METFORMIN HYDROCHLORIDE SCH MG: 500 TABLET ORAL at 16:31

## 2017-04-22 RX ADMIN — AMLODIPINE BESYLATE SCH MG: 10 TABLET ORAL at 09:54

## 2017-04-22 RX ADMIN — DOCUSATE SODIUM SCH MG: 100 CAPSULE, LIQUID FILLED ORAL at 05:55

## 2017-04-22 RX ADMIN — METFORMIN HYDROCHLORIDE SCH MG: 500 TABLET ORAL at 06:38

## 2017-04-22 RX ADMIN — DOCUSATE SODIUM SCH MG: 100 CAPSULE, LIQUID FILLED ORAL at 21:02

## 2017-04-22 RX ADMIN — GUAIFENESIN AND DEXTROMETHORPHAN PRN ML: 100; 10 SYRUP ORAL at 13:59

## 2017-04-22 RX ADMIN — FLUTICASONE PROPIONATE SCH SPRAY: 50 SPRAY, METERED NASAL at 21:06

## 2017-04-22 RX ADMIN — BACITRACIN ZINC SCH GM: 500 OINTMENT TOPICAL at 09:54

## 2017-04-22 RX ADMIN — QUETIAPINE FUMARATE SCH MG: 100 TABLET ORAL at 21:02

## 2017-04-22 RX ADMIN — HYDROCORTISONE SCH APPLIC: 25 CREAM TOPICAL at 09:57

## 2017-04-22 RX ADMIN — NICOTINE SCH MG: 21 PATCH TRANSDERMAL at 09:58

## 2017-04-22 RX ADMIN — CLINDAMYCIN HYDROCHLORIDE SCH MG: 150 CAPSULE ORAL at 11:37

## 2017-04-22 RX ADMIN — BUDESONIDE AND FORMOTEROL FUMARATE DIHYDRATE SCH PUFF: 160; 4.5 AEROSOL RESPIRATORY (INHALATION) at 09:53

## 2017-04-22 RX ADMIN — ROSUVASTATIN CALCIUM SCH MG: 20 TABLET, FILM COATED ORAL at 21:02

## 2017-04-22 RX ADMIN — HYDROCORTISONE SCH APPLIC: 25 CREAM TOPICAL at 21:04

## 2017-04-22 RX ADMIN — TRAZODONE HYDROCHLORIDE SCH MG: 100 TABLET ORAL at 21:02

## 2017-04-22 RX ADMIN — LISINOPRIL SCH MG: 10 TABLET ORAL at 09:54

## 2017-04-22 RX ADMIN — Medication SCH MG: at 21:07

## 2017-04-22 RX ADMIN — QUETIAPINE FUMARATE SCH MG: 100 TABLET ORAL at 09:54

## 2017-04-22 RX ADMIN — CLINDAMYCIN HYDROCHLORIDE SCH MG: 150 CAPSULE ORAL at 17:47

## 2017-04-22 RX ADMIN — INSULIN ASPART SCH UNIT: 100 INJECTION, SOLUTION INTRAVENOUS; SUBCUTANEOUS at 16:30

## 2017-04-22 RX ADMIN — INSULIN ASPART SCH: 100 INJECTION, SOLUTION INTRAVENOUS; SUBCUTANEOUS at 06:02

## 2017-04-22 RX ADMIN — Medication SCH TAB: at 09:54

## 2017-04-22 RX ADMIN — CLINDAMYCIN HYDROCHLORIDE SCH MG: 150 CAPSULE ORAL at 23:19

## 2017-04-22 RX ADMIN — Medication SCH GM: at 09:59

## 2017-04-22 RX ADMIN — GABAPENTIN SCH MG: 300 CAPSULE ORAL at 21:02

## 2017-04-22 RX ADMIN — GABAPENTIN SCH MG: 300 CAPSULE ORAL at 05:55

## 2017-04-22 RX ADMIN — GABAPENTIN SCH MG: 300 CAPSULE ORAL at 13:58

## 2017-04-22 RX ADMIN — Medication SCH GM: at 21:07

## 2017-04-22 RX ADMIN — FLUTICASONE PROPIONATE SCH SPRAY: 50 SPRAY, METERED NASAL at 09:56

## 2017-04-22 RX ADMIN — BUDESONIDE AND FORMOTEROL FUMARATE DIHYDRATE SCH PUFF: 160; 4.5 AEROSOL RESPIRATORY (INHALATION) at 21:06

## 2017-04-22 RX ADMIN — CLINDAMYCIN HYDROCHLORIDE SCH MG: 150 CAPSULE ORAL at 05:55

## 2017-04-23 RX ADMIN — HYDROCORTISONE SCH: 25 CREAM TOPICAL at 22:00

## 2017-04-23 RX ADMIN — LISINOPRIL SCH MG: 10 TABLET ORAL at 09:52

## 2017-04-23 RX ADMIN — Medication SCH MG: at 21:48

## 2017-04-23 RX ADMIN — ASPIRIN 81 MG SCH MG: 81 TABLET ORAL at 09:52

## 2017-04-23 RX ADMIN — Medication SCH GM: at 21:50

## 2017-04-23 RX ADMIN — CLINDAMYCIN HYDROCHLORIDE SCH MG: 150 CAPSULE ORAL at 17:33

## 2017-04-23 RX ADMIN — METFORMIN HYDROCHLORIDE SCH MG: 500 TABLET ORAL at 06:39

## 2017-04-23 RX ADMIN — NICOTINE SCH MG: 21 PATCH TRANSDERMAL at 09:53

## 2017-04-23 RX ADMIN — FLUTICASONE PROPIONATE SCH SPRAY: 50 SPRAY, METERED NASAL at 21:49

## 2017-04-23 RX ADMIN — Medication SCH GM: at 09:54

## 2017-04-23 RX ADMIN — CLINDAMYCIN HYDROCHLORIDE SCH MG: 150 CAPSULE ORAL at 23:39

## 2017-04-23 RX ADMIN — BUDESONIDE AND FORMOTEROL FUMARATE DIHYDRATE SCH PUFF: 160; 4.5 AEROSOL RESPIRATORY (INHALATION) at 09:51

## 2017-04-23 RX ADMIN — METFORMIN HYDROCHLORIDE SCH MG: 500 TABLET ORAL at 16:32

## 2017-04-23 RX ADMIN — INSULIN ASPART SCH UNIT: 100 INJECTION, SOLUTION INTRAVENOUS; SUBCUTANEOUS at 16:32

## 2017-04-23 RX ADMIN — DOCUSATE SODIUM SCH MG: 100 CAPSULE, LIQUID FILLED ORAL at 06:39

## 2017-04-23 RX ADMIN — GABAPENTIN SCH MG: 300 CAPSULE ORAL at 13:06

## 2017-04-23 RX ADMIN — FLUTICASONE PROPIONATE SCH SPRAY: 50 SPRAY, METERED NASAL at 09:53

## 2017-04-23 RX ADMIN — DOCUSATE SODIUM SCH MG: 100 CAPSULE, LIQUID FILLED ORAL at 21:48

## 2017-04-23 RX ADMIN — BUDESONIDE AND FORMOTEROL FUMARATE DIHYDRATE SCH PUFF: 160; 4.5 AEROSOL RESPIRATORY (INHALATION) at 21:49

## 2017-04-23 RX ADMIN — GABAPENTIN SCH MG: 300 CAPSULE ORAL at 21:48

## 2017-04-23 RX ADMIN — BACITRACIN ZINC SCH GM: 500 OINTMENT TOPICAL at 21:48

## 2017-04-23 RX ADMIN — AMLODIPINE BESYLATE SCH MG: 10 TABLET ORAL at 09:52

## 2017-04-23 RX ADMIN — GUAIFENESIN AND DEXTROMETHORPHAN PRN ML: 100; 10 SYRUP ORAL at 11:07

## 2017-04-23 RX ADMIN — INSULIN ASPART SCH: 100 INJECTION, SOLUTION INTRAVENOUS; SUBCUTANEOUS at 06:40

## 2017-04-23 RX ADMIN — GABAPENTIN SCH MG: 300 CAPSULE ORAL at 06:39

## 2017-04-23 RX ADMIN — Medication SCH TAB: at 09:52

## 2017-04-23 RX ADMIN — CLINDAMYCIN HYDROCHLORIDE SCH MG: 150 CAPSULE ORAL at 11:07

## 2017-04-23 RX ADMIN — ACETAMINOPHEN PRN MG: 325 TABLET ORAL at 11:07

## 2017-04-23 RX ADMIN — BACITRACIN ZINC SCH GM: 500 OINTMENT TOPICAL at 09:52

## 2017-04-23 RX ADMIN — QUETIAPINE FUMARATE SCH MG: 100 TABLET ORAL at 09:52

## 2017-04-23 RX ADMIN — CLINDAMYCIN HYDROCHLORIDE SCH MG: 150 CAPSULE ORAL at 06:39

## 2017-04-23 RX ADMIN — ROSUVASTATIN CALCIUM SCH MG: 20 TABLET, FILM COATED ORAL at 21:48

## 2017-04-23 RX ADMIN — QUETIAPINE FUMARATE SCH MG: 100 TABLET ORAL at 21:48

## 2017-04-23 RX ADMIN — DOCUSATE SODIUM SCH MG: 100 CAPSULE, LIQUID FILLED ORAL at 13:06

## 2017-04-23 RX ADMIN — TRAZODONE HYDROCHLORIDE SCH MG: 100 TABLET ORAL at 21:48

## 2017-04-23 RX ADMIN — HYDROCORTISONE SCH APPLIC: 25 CREAM TOPICAL at 09:53

## 2017-04-24 RX ADMIN — AMLODIPINE BESYLATE SCH MG: 10 TABLET ORAL at 10:25

## 2017-04-24 RX ADMIN — BACITRACIN ZINC SCH GM: 500 OINTMENT TOPICAL at 10:25

## 2017-04-24 RX ADMIN — QUETIAPINE FUMARATE SCH MG: 100 TABLET ORAL at 10:25

## 2017-04-24 RX ADMIN — ROSUVASTATIN CALCIUM SCH MG: 20 TABLET, FILM COATED ORAL at 21:51

## 2017-04-24 RX ADMIN — TRAZODONE HYDROCHLORIDE SCH MG: 100 TABLET ORAL at 21:51

## 2017-04-24 RX ADMIN — Medication SCH TAB: at 10:25

## 2017-04-24 RX ADMIN — BUDESONIDE AND FORMOTEROL FUMARATE DIHYDRATE SCH PUFF: 160; 4.5 AEROSOL RESPIRATORY (INHALATION) at 21:50

## 2017-04-24 RX ADMIN — GABAPENTIN SCH MG: 300 CAPSULE ORAL at 14:29

## 2017-04-24 RX ADMIN — Medication SCH GM: at 10:28

## 2017-04-24 RX ADMIN — TRIAMCINOLONE ACETONIDE SCH APPLIC: 1 CREAM TOPICAL at 21:53

## 2017-04-24 RX ADMIN — CLINDAMYCIN HYDROCHLORIDE SCH MG: 150 CAPSULE ORAL at 17:03

## 2017-04-24 RX ADMIN — Medication SCH MG: at 21:51

## 2017-04-24 RX ADMIN — DOCUSATE SODIUM SCH MG: 100 CAPSULE, LIQUID FILLED ORAL at 21:50

## 2017-04-24 RX ADMIN — HYDROCORTISONE SCH APPLIC: 25 CREAM TOPICAL at 10:26

## 2017-04-24 RX ADMIN — DOCUSATE SODIUM SCH MG: 100 CAPSULE, LIQUID FILLED ORAL at 06:23

## 2017-04-24 RX ADMIN — CLINDAMYCIN HYDROCHLORIDE SCH MG: 150 CAPSULE ORAL at 11:58

## 2017-04-24 RX ADMIN — ASPIRIN 81 MG SCH MG: 81 TABLET ORAL at 10:25

## 2017-04-24 RX ADMIN — CLINDAMYCIN HYDROCHLORIDE SCH MG: 150 CAPSULE ORAL at 06:23

## 2017-04-24 RX ADMIN — GABAPENTIN SCH MG: 300 CAPSULE ORAL at 21:51

## 2017-04-24 RX ADMIN — HYDROCORTISONE SCH APPLIC: 25 CREAM TOPICAL at 21:54

## 2017-04-24 RX ADMIN — METFORMIN HYDROCHLORIDE SCH MG: 500 TABLET ORAL at 17:03

## 2017-04-24 RX ADMIN — DOCUSATE SODIUM SCH MG: 100 CAPSULE, LIQUID FILLED ORAL at 14:29

## 2017-04-24 RX ADMIN — METFORMIN HYDROCHLORIDE SCH MG: 500 TABLET ORAL at 06:23

## 2017-04-24 RX ADMIN — INSULIN ASPART SCH: 100 INJECTION, SOLUTION INTRAVENOUS; SUBCUTANEOUS at 06:23

## 2017-04-24 RX ADMIN — INSULIN ASPART SCH UNIT: 100 INJECTION, SOLUTION INTRAVENOUS; SUBCUTANEOUS at 17:00

## 2017-04-24 RX ADMIN — GABAPENTIN SCH MG: 300 CAPSULE ORAL at 06:23

## 2017-04-24 RX ADMIN — BUDESONIDE AND FORMOTEROL FUMARATE DIHYDRATE SCH PUFF: 160; 4.5 AEROSOL RESPIRATORY (INHALATION) at 10:25

## 2017-04-24 RX ADMIN — FLUTICASONE PROPIONATE SCH SPRAY: 50 SPRAY, METERED NASAL at 21:52

## 2017-04-24 RX ADMIN — QUETIAPINE FUMARATE SCH MG: 100 TABLET ORAL at 21:51

## 2017-04-24 RX ADMIN — LISINOPRIL SCH MG: 10 TABLET ORAL at 10:25

## 2017-04-24 RX ADMIN — BACITRACIN ZINC SCH GM: 500 OINTMENT TOPICAL at 21:50

## 2017-04-24 RX ADMIN — NICOTINE SCH MG: 21 PATCH TRANSDERMAL at 10:27

## 2017-04-24 RX ADMIN — FLUTICASONE PROPIONATE SCH SPRAY: 50 SPRAY, METERED NASAL at 10:27

## 2017-04-25 RX ADMIN — ALBUTEROL SULFATE PRN PUFF: 90 AEROSOL, METERED RESPIRATORY (INHALATION) at 11:52

## 2017-04-25 RX ADMIN — DOCUSATE SODIUM SCH MG: 100 CAPSULE, LIQUID FILLED ORAL at 21:40

## 2017-04-25 RX ADMIN — GABAPENTIN SCH MG: 300 CAPSULE ORAL at 21:43

## 2017-04-25 RX ADMIN — GABAPENTIN SCH MG: 300 CAPSULE ORAL at 05:48

## 2017-04-25 RX ADMIN — HYDROCORTISONE SCH APPLIC: 25 CREAM TOPICAL at 21:45

## 2017-04-25 RX ADMIN — QUETIAPINE FUMARATE SCH MG: 100 TABLET ORAL at 21:40

## 2017-04-25 RX ADMIN — METFORMIN HYDROCHLORIDE SCH MG: 500 TABLET ORAL at 16:45

## 2017-04-25 RX ADMIN — DOCUSATE SODIUM SCH MG: 100 CAPSULE, LIQUID FILLED ORAL at 14:27

## 2017-04-25 RX ADMIN — DOCUSATE SODIUM SCH MG: 100 CAPSULE, LIQUID FILLED ORAL at 05:48

## 2017-04-25 RX ADMIN — NICOTINE SCH MG: 21 PATCH TRANSDERMAL at 10:22

## 2017-04-25 RX ADMIN — ROSUVASTATIN CALCIUM SCH MG: 20 TABLET, FILM COATED ORAL at 21:40

## 2017-04-25 RX ADMIN — BUDESONIDE AND FORMOTEROL FUMARATE DIHYDRATE SCH PUFF: 160; 4.5 AEROSOL RESPIRATORY (INHALATION) at 10:18

## 2017-04-25 RX ADMIN — FLUTICASONE PROPIONATE SCH SPRAY: 50 SPRAY, METERED NASAL at 21:46

## 2017-04-25 RX ADMIN — GABAPENTIN SCH MG: 300 CAPSULE ORAL at 14:27

## 2017-04-25 RX ADMIN — FLUTICASONE PROPIONATE SCH SPRAY: 50 SPRAY, METERED NASAL at 10:21

## 2017-04-25 RX ADMIN — ASPIRIN 81 MG SCH MG: 81 TABLET ORAL at 10:19

## 2017-04-25 RX ADMIN — INSULIN ASPART SCH UNIT: 100 INJECTION, SOLUTION INTRAVENOUS; SUBCUTANEOUS at 16:47

## 2017-04-25 RX ADMIN — TRIAMCINOLONE ACETONIDE SCH APPLIC: 1 CREAM TOPICAL at 21:42

## 2017-04-25 RX ADMIN — TRAZODONE HYDROCHLORIDE SCH MG: 100 TABLET ORAL at 21:40

## 2017-04-25 RX ADMIN — AMLODIPINE BESYLATE SCH MG: 10 TABLET ORAL at 10:19

## 2017-04-25 RX ADMIN — METFORMIN HYDROCHLORIDE SCH MG: 500 TABLET ORAL at 06:29

## 2017-04-25 RX ADMIN — BACITRACIN ZINC SCH GM: 500 OINTMENT TOPICAL at 10:19

## 2017-04-25 RX ADMIN — BUDESONIDE AND FORMOTEROL FUMARATE DIHYDRATE SCH PUFF: 160; 4.5 AEROSOL RESPIRATORY (INHALATION) at 21:45

## 2017-04-25 RX ADMIN — INSULIN ASPART SCH: 100 INJECTION, SOLUTION INTRAVENOUS; SUBCUTANEOUS at 06:28

## 2017-04-25 RX ADMIN — Medication SCH MG: at 21:40

## 2017-04-25 RX ADMIN — Medication SCH TAB: at 10:18

## 2017-04-25 RX ADMIN — QUETIAPINE FUMARATE SCH MG: 100 TABLET ORAL at 10:19

## 2017-04-25 RX ADMIN — HYDROCORTISONE SCH: 25 CREAM TOPICAL at 10:23

## 2017-04-25 RX ADMIN — LISINOPRIL SCH MG: 10 TABLET ORAL at 10:22

## 2017-04-25 RX ADMIN — TRIAMCINOLONE ACETONIDE SCH APPLIC: 1 CREAM TOPICAL at 10:20

## 2017-04-25 RX ADMIN — BACITRACIN ZINC SCH GM: 500 OINTMENT TOPICAL at 21:39

## 2017-04-26 RX ADMIN — METFORMIN HYDROCHLORIDE SCH MG: 500 TABLET ORAL at 06:27

## 2017-04-26 RX ADMIN — BUDESONIDE AND FORMOTEROL FUMARATE DIHYDRATE SCH PUFF: 160; 4.5 AEROSOL RESPIRATORY (INHALATION) at 09:46

## 2017-04-26 RX ADMIN — BACITRACIN ZINC SCH GM: 500 OINTMENT TOPICAL at 21:54

## 2017-04-26 RX ADMIN — NICOTINE SCH MG: 21 PATCH TRANSDERMAL at 09:47

## 2017-04-26 RX ADMIN — TRAZODONE HYDROCHLORIDE SCH MG: 100 TABLET ORAL at 22:00

## 2017-04-26 RX ADMIN — FLUTICASONE PROPIONATE SCH SPRAY: 50 SPRAY, METERED NASAL at 21:58

## 2017-04-26 RX ADMIN — TRIAMCINOLONE ACETONIDE SCH APPLIC: 1 CREAM TOPICAL at 09:46

## 2017-04-26 RX ADMIN — ACETAMINOPHEN PRN MG: 325 TABLET ORAL at 22:02

## 2017-04-26 RX ADMIN — FLUTICASONE PROPIONATE SCH SPRAY: 50 SPRAY, METERED NASAL at 09:46

## 2017-04-26 RX ADMIN — TRIAMCINOLONE ACETONIDE SCH APPLIC: 1 CREAM TOPICAL at 21:58

## 2017-04-26 RX ADMIN — GABAPENTIN SCH MG: 300 CAPSULE ORAL at 06:27

## 2017-04-26 RX ADMIN — Medication SCH MG: at 21:55

## 2017-04-26 RX ADMIN — ROSUVASTATIN CALCIUM SCH MG: 20 TABLET, FILM COATED ORAL at 21:55

## 2017-04-26 RX ADMIN — HYDROCORTISONE SCH APPLIC: 25 CREAM TOPICAL at 21:56

## 2017-04-26 RX ADMIN — METFORMIN HYDROCHLORIDE SCH MG: 500 TABLET ORAL at 16:43

## 2017-04-26 RX ADMIN — QUETIAPINE FUMARATE SCH MG: 100 TABLET ORAL at 09:46

## 2017-04-26 RX ADMIN — DOCUSATE SODIUM SCH MG: 100 CAPSULE, LIQUID FILLED ORAL at 06:27

## 2017-04-26 RX ADMIN — GABAPENTIN SCH MG: 300 CAPSULE ORAL at 14:12

## 2017-04-26 RX ADMIN — INSULIN ASPART SCH: 100 INJECTION, SOLUTION INTRAVENOUS; SUBCUTANEOUS at 06:28

## 2017-04-26 RX ADMIN — QUETIAPINE FUMARATE SCH MG: 100 TABLET ORAL at 21:54

## 2017-04-26 RX ADMIN — AMLODIPINE BESYLATE SCH MG: 10 TABLET ORAL at 09:47

## 2017-04-26 RX ADMIN — DOCUSATE SODIUM SCH MG: 100 CAPSULE, LIQUID FILLED ORAL at 21:55

## 2017-04-26 RX ADMIN — Medication SCH TAB: at 09:47

## 2017-04-26 RX ADMIN — DOCUSATE SODIUM SCH MG: 100 CAPSULE, LIQUID FILLED ORAL at 14:12

## 2017-04-26 RX ADMIN — LISINOPRIL SCH MG: 10 TABLET ORAL at 09:47

## 2017-04-26 RX ADMIN — ALBUTEROL SULFATE PRN PUFF: 90 AEROSOL, METERED RESPIRATORY (INHALATION) at 12:57

## 2017-04-26 RX ADMIN — GABAPENTIN SCH MG: 300 CAPSULE ORAL at 21:55

## 2017-04-26 RX ADMIN — HYDROCORTISONE SCH APPLIC: 25 CREAM TOPICAL at 09:45

## 2017-04-26 RX ADMIN — BUDESONIDE AND FORMOTEROL FUMARATE DIHYDRATE SCH PUFF: 160; 4.5 AEROSOL RESPIRATORY (INHALATION) at 21:56

## 2017-04-26 RX ADMIN — BACITRACIN ZINC SCH GM: 500 OINTMENT TOPICAL at 09:46

## 2017-04-26 RX ADMIN — ASPIRIN 81 MG SCH MG: 81 TABLET ORAL at 09:47

## 2017-04-26 RX ADMIN — INSULIN ASPART SCH UNIT: 100 INJECTION, SOLUTION INTRAVENOUS; SUBCUTANEOUS at 16:47

## 2017-04-27 RX ADMIN — TRIAMCINOLONE ACETONIDE SCH APPLIC: 1 CREAM TOPICAL at 21:50

## 2017-04-27 RX ADMIN — GABAPENTIN SCH MG: 300 CAPSULE ORAL at 13:52

## 2017-04-27 RX ADMIN — FLUTICASONE PROPIONATE SCH SPRAY: 50 SPRAY, METERED NASAL at 21:50

## 2017-04-27 RX ADMIN — QUETIAPINE FUMARATE SCH MG: 100 TABLET ORAL at 10:46

## 2017-04-27 RX ADMIN — GABAPENTIN SCH MG: 300 CAPSULE ORAL at 21:49

## 2017-04-27 RX ADMIN — AMLODIPINE BESYLATE SCH MG: 10 TABLET ORAL at 10:46

## 2017-04-27 RX ADMIN — ASPIRIN 81 MG SCH MG: 81 TABLET ORAL at 10:45

## 2017-04-27 RX ADMIN — BUDESONIDE AND FORMOTEROL FUMARATE DIHYDRATE SCH PUFF: 160; 4.5 AEROSOL RESPIRATORY (INHALATION) at 21:49

## 2017-04-27 RX ADMIN — DOCUSATE SODIUM SCH MG: 100 CAPSULE, LIQUID FILLED ORAL at 21:48

## 2017-04-27 RX ADMIN — FLUTICASONE PROPIONATE SCH SPRAY: 50 SPRAY, METERED NASAL at 10:48

## 2017-04-27 RX ADMIN — INSULIN ASPART SCH UNIT: 100 INJECTION, SOLUTION INTRAVENOUS; SUBCUTANEOUS at 16:45

## 2017-04-27 RX ADMIN — BACITRACIN ZINC SCH GM: 500 OINTMENT TOPICAL at 21:48

## 2017-04-27 RX ADMIN — INSULIN ASPART SCH UNIT: 100 INJECTION, SOLUTION INTRAVENOUS; SUBCUTANEOUS at 06:05

## 2017-04-27 RX ADMIN — HYDROCORTISONE SCH APPLIC: 25 CREAM TOPICAL at 21:50

## 2017-04-27 RX ADMIN — Medication SCH TAB: at 10:46

## 2017-04-27 RX ADMIN — GABAPENTIN SCH MG: 300 CAPSULE ORAL at 06:04

## 2017-04-27 RX ADMIN — NICOTINE SCH MG: 21 PATCH TRANSDERMAL at 10:48

## 2017-04-27 RX ADMIN — TRIAMCINOLONE ACETONIDE SCH APPLIC: 1 CREAM TOPICAL at 10:46

## 2017-04-27 RX ADMIN — LISINOPRIL SCH MG: 10 TABLET ORAL at 10:46

## 2017-04-27 RX ADMIN — METFORMIN HYDROCHLORIDE SCH MG: 500 TABLET ORAL at 16:44

## 2017-04-27 RX ADMIN — HYDROCORTISONE SCH APPLIC: 25 CREAM TOPICAL at 10:46

## 2017-04-27 RX ADMIN — Medication SCH MG: at 21:49

## 2017-04-27 RX ADMIN — DOCUSATE SODIUM SCH MG: 100 CAPSULE, LIQUID FILLED ORAL at 13:52

## 2017-04-27 RX ADMIN — GUAIFENESIN AND DEXTROMETHORPHAN PRN ML: 100; 10 SYRUP ORAL at 03:45

## 2017-04-27 RX ADMIN — TRAZODONE HYDROCHLORIDE SCH MG: 100 TABLET ORAL at 21:49

## 2017-04-27 RX ADMIN — BUDESONIDE AND FORMOTEROL FUMARATE DIHYDRATE SCH PUFF: 160; 4.5 AEROSOL RESPIRATORY (INHALATION) at 10:45

## 2017-04-27 RX ADMIN — BACITRACIN ZINC SCH GM: 500 OINTMENT TOPICAL at 10:46

## 2017-04-27 RX ADMIN — METFORMIN HYDROCHLORIDE SCH MG: 500 TABLET ORAL at 06:04

## 2017-04-27 RX ADMIN — ALBUTEROL SULFATE PRN PUFF: 90 AEROSOL, METERED RESPIRATORY (INHALATION) at 03:45

## 2017-04-27 RX ADMIN — ACETAMINOPHEN PRN MG: 325 TABLET ORAL at 13:53

## 2017-04-27 RX ADMIN — ROSUVASTATIN CALCIUM SCH MG: 20 TABLET, FILM COATED ORAL at 21:49

## 2017-04-27 RX ADMIN — DOCUSATE SODIUM SCH MG: 100 CAPSULE, LIQUID FILLED ORAL at 06:04

## 2017-04-28 RX ADMIN — BUDESONIDE AND FORMOTEROL FUMARATE DIHYDRATE SCH PUFF: 160; 4.5 AEROSOL RESPIRATORY (INHALATION) at 10:31

## 2017-04-28 RX ADMIN — HYDROCORTISONE SCH APPLIC: 25 CREAM TOPICAL at 21:46

## 2017-04-28 RX ADMIN — INSULIN ASPART SCH: 100 INJECTION, SOLUTION INTRAVENOUS; SUBCUTANEOUS at 06:01

## 2017-04-28 RX ADMIN — NICOTINE SCH MG: 21 PATCH TRANSDERMAL at 10:33

## 2017-04-28 RX ADMIN — BACITRACIN ZINC SCH GM: 500 OINTMENT TOPICAL at 10:30

## 2017-04-28 RX ADMIN — GABAPENTIN SCH MG: 300 CAPSULE ORAL at 21:45

## 2017-04-28 RX ADMIN — Medication SCH MG: at 21:44

## 2017-04-28 RX ADMIN — METFORMIN HYDROCHLORIDE SCH MG: 500 TABLET ORAL at 17:07

## 2017-04-28 RX ADMIN — HYDROCORTISONE SCH APPLIC: 25 CREAM TOPICAL at 10:32

## 2017-04-28 RX ADMIN — DOCUSATE SODIUM SCH MG: 100 CAPSULE, LIQUID FILLED ORAL at 06:01

## 2017-04-28 RX ADMIN — TRIAMCINOLONE ACETONIDE SCH APPLIC: 1 CREAM TOPICAL at 10:32

## 2017-04-28 RX ADMIN — GABAPENTIN SCH MG: 300 CAPSULE ORAL at 06:01

## 2017-04-28 RX ADMIN — GABAPENTIN SCH MG: 300 CAPSULE ORAL at 14:26

## 2017-04-28 RX ADMIN — Medication SCH TAB: at 10:30

## 2017-04-28 RX ADMIN — TRIAMCINOLONE ACETONIDE SCH APPLIC: 1 CREAM TOPICAL at 21:46

## 2017-04-28 RX ADMIN — DOCUSATE SODIUM SCH MG: 100 CAPSULE, LIQUID FILLED ORAL at 21:45

## 2017-04-28 RX ADMIN — INSULIN ASPART SCH UNIT: 100 INJECTION, SOLUTION INTRAVENOUS; SUBCUTANEOUS at 17:08

## 2017-04-28 RX ADMIN — BACITRACIN ZINC SCH: 500 OINTMENT TOPICAL at 21:47

## 2017-04-28 RX ADMIN — METFORMIN HYDROCHLORIDE SCH MG: 500 TABLET ORAL at 06:01

## 2017-04-28 RX ADMIN — DOCUSATE SODIUM SCH MG: 100 CAPSULE, LIQUID FILLED ORAL at 14:26

## 2017-04-28 RX ADMIN — ASPIRIN 81 MG SCH MG: 81 TABLET ORAL at 10:30

## 2017-04-28 RX ADMIN — TRAZODONE HYDROCHLORIDE SCH MG: 100 TABLET ORAL at 21:44

## 2017-04-28 RX ADMIN — LISINOPRIL SCH MG: 10 TABLET ORAL at 10:30

## 2017-04-28 RX ADMIN — FLUTICASONE PROPIONATE SCH: 50 SPRAY, METERED NASAL at 22:05

## 2017-04-28 RX ADMIN — ROSUVASTATIN CALCIUM SCH MG: 20 TABLET, FILM COATED ORAL at 21:44

## 2017-04-28 RX ADMIN — BUDESONIDE AND FORMOTEROL FUMARATE DIHYDRATE SCH PUFF: 160; 4.5 AEROSOL RESPIRATORY (INHALATION) at 21:44

## 2017-04-28 RX ADMIN — AMLODIPINE BESYLATE SCH MG: 10 TABLET ORAL at 10:30

## 2017-04-28 RX ADMIN — FLUTICASONE PROPIONATE SCH SPRAY: 50 SPRAY, METERED NASAL at 10:32

## 2017-04-29 RX ADMIN — INSULIN ASPART SCH: 100 INJECTION, SOLUTION INTRAVENOUS; SUBCUTANEOUS at 06:16

## 2017-04-29 RX ADMIN — NICOTINE SCH MG: 21 PATCH TRANSDERMAL at 10:49

## 2017-04-29 RX ADMIN — ROSUVASTATIN CALCIUM SCH MG: 20 TABLET, FILM COATED ORAL at 21:38

## 2017-04-29 RX ADMIN — FLUTICASONE PROPIONATE SCH SPRAY: 50 SPRAY, METERED NASAL at 21:39

## 2017-04-29 RX ADMIN — DOCUSATE SODIUM SCH MG: 100 CAPSULE, LIQUID FILLED ORAL at 21:38

## 2017-04-29 RX ADMIN — DOCUSATE SODIUM SCH MG: 100 CAPSULE, LIQUID FILLED ORAL at 06:15

## 2017-04-29 RX ADMIN — BACITRACIN ZINC SCH GM: 500 OINTMENT TOPICAL at 21:38

## 2017-04-29 RX ADMIN — ALUMINUM HYDROXIDE, MAGNESIUM HYDROXIDE, AND SIMETHICONE PRN ML: 200; 200; 20 SUSPENSION ORAL at 00:49

## 2017-04-29 RX ADMIN — HYDROCORTISONE SCH APPLIC: 25 CREAM TOPICAL at 10:50

## 2017-04-29 RX ADMIN — GABAPENTIN SCH MG: 300 CAPSULE ORAL at 06:15

## 2017-04-29 RX ADMIN — METFORMIN HYDROCHLORIDE SCH MG: 500 TABLET ORAL at 16:59

## 2017-04-29 RX ADMIN — TRIAMCINOLONE ACETONIDE SCH APPLIC: 1 CREAM TOPICAL at 10:50

## 2017-04-29 RX ADMIN — METFORMIN HYDROCHLORIDE SCH MG: 500 TABLET ORAL at 06:15

## 2017-04-29 RX ADMIN — FLUTICASONE PROPIONATE SCH SPRAY: 50 SPRAY, METERED NASAL at 10:50

## 2017-04-29 RX ADMIN — GABAPENTIN SCH MG: 300 CAPSULE ORAL at 14:07

## 2017-04-29 RX ADMIN — LISINOPRIL SCH MG: 10 TABLET ORAL at 10:56

## 2017-04-29 RX ADMIN — Medication SCH TAB: at 10:49

## 2017-04-29 RX ADMIN — AMLODIPINE BESYLATE SCH MG: 10 TABLET ORAL at 10:49

## 2017-04-29 RX ADMIN — BACITRACIN ZINC SCH GM: 500 OINTMENT TOPICAL at 10:50

## 2017-04-29 RX ADMIN — INSULIN ASPART SCH: 100 INJECTION, SOLUTION INTRAVENOUS; SUBCUTANEOUS at 16:59

## 2017-04-29 RX ADMIN — BUDESONIDE AND FORMOTEROL FUMARATE DIHYDRATE SCH PUFF: 160; 4.5 AEROSOL RESPIRATORY (INHALATION) at 10:48

## 2017-04-29 RX ADMIN — Medication SCH MG: at 21:38

## 2017-04-29 RX ADMIN — GABAPENTIN SCH MG: 300 CAPSULE ORAL at 21:38

## 2017-04-29 RX ADMIN — BUDESONIDE AND FORMOTEROL FUMARATE DIHYDRATE SCH PUFF: 160; 4.5 AEROSOL RESPIRATORY (INHALATION) at 21:40

## 2017-04-29 RX ADMIN — HYDROCORTISONE SCH APPLIC: 25 CREAM TOPICAL at 21:39

## 2017-04-29 RX ADMIN — TRAZODONE HYDROCHLORIDE SCH MG: 100 TABLET ORAL at 21:39

## 2017-04-29 RX ADMIN — DOCUSATE SODIUM SCH MG: 100 CAPSULE, LIQUID FILLED ORAL at 14:07

## 2017-04-29 RX ADMIN — ASPIRIN 81 MG SCH MG: 81 TABLET ORAL at 10:49

## 2017-04-29 RX ADMIN — TRIAMCINOLONE ACETONIDE SCH APPLIC: 1 CREAM TOPICAL at 21:38

## 2017-04-30 RX ADMIN — METFORMIN HYDROCHLORIDE SCH MG: 500 TABLET ORAL at 06:42

## 2017-04-30 RX ADMIN — HYDROCORTISONE SCH APPLIC: 25 CREAM TOPICAL at 10:33

## 2017-04-30 RX ADMIN — GABAPENTIN SCH MG: 300 CAPSULE ORAL at 06:42

## 2017-04-30 RX ADMIN — Medication SCH MG: at 21:44

## 2017-04-30 RX ADMIN — DOCUSATE SODIUM SCH MG: 100 CAPSULE, LIQUID FILLED ORAL at 06:42

## 2017-04-30 RX ADMIN — INSULIN ASPART SCH: 100 INJECTION, SOLUTION INTRAVENOUS; SUBCUTANEOUS at 06:43

## 2017-04-30 RX ADMIN — Medication SCH TAB: at 10:31

## 2017-04-30 RX ADMIN — BUDESONIDE AND FORMOTEROL FUMARATE DIHYDRATE SCH PUFF: 160; 4.5 AEROSOL RESPIRATORY (INHALATION) at 10:32

## 2017-04-30 RX ADMIN — ROSUVASTATIN CALCIUM SCH MG: 20 TABLET, FILM COATED ORAL at 21:42

## 2017-04-30 RX ADMIN — GABAPENTIN SCH MG: 300 CAPSULE ORAL at 21:41

## 2017-04-30 RX ADMIN — LISINOPRIL SCH MG: 10 TABLET ORAL at 10:31

## 2017-04-30 RX ADMIN — BACITRACIN ZINC SCH GM: 500 OINTMENT TOPICAL at 10:29

## 2017-04-30 RX ADMIN — AMLODIPINE BESYLATE SCH MG: 10 TABLET ORAL at 10:31

## 2017-04-30 RX ADMIN — DOCUSATE SODIUM SCH MG: 100 CAPSULE, LIQUID FILLED ORAL at 14:20

## 2017-04-30 RX ADMIN — NICOTINE SCH MG: 21 PATCH TRANSDERMAL at 10:31

## 2017-04-30 RX ADMIN — HYDROCORTISONE SCH APPLIC: 25 CREAM TOPICAL at 21:43

## 2017-04-30 RX ADMIN — BUDESONIDE AND FORMOTEROL FUMARATE DIHYDRATE SCH PUFF: 160; 4.5 AEROSOL RESPIRATORY (INHALATION) at 21:41

## 2017-04-30 RX ADMIN — BACITRACIN ZINC SCH GM: 500 OINTMENT TOPICAL at 21:41

## 2017-04-30 RX ADMIN — TRIAMCINOLONE ACETONIDE SCH APPLIC: 1 CREAM TOPICAL at 21:43

## 2017-04-30 RX ADMIN — GABAPENTIN SCH MG: 300 CAPSULE ORAL at 14:20

## 2017-04-30 RX ADMIN — TRAZODONE HYDROCHLORIDE SCH MG: 100 TABLET ORAL at 21:41

## 2017-04-30 RX ADMIN — FLUTICASONE PROPIONATE SCH SPRAY: 50 SPRAY, METERED NASAL at 21:44

## 2017-04-30 RX ADMIN — DOCUSATE SODIUM SCH MG: 100 CAPSULE, LIQUID FILLED ORAL at 21:41

## 2017-04-30 RX ADMIN — TRIAMCINOLONE ACETONIDE SCH APPLIC: 1 CREAM TOPICAL at 10:33

## 2017-04-30 RX ADMIN — METFORMIN HYDROCHLORIDE SCH MG: 500 TABLET ORAL at 16:33

## 2017-04-30 RX ADMIN — INSULIN ASPART SCH UNIT: 100 INJECTION, SOLUTION INTRAVENOUS; SUBCUTANEOUS at 16:32

## 2017-04-30 RX ADMIN — FLUTICASONE PROPIONATE SCH SPRAY: 50 SPRAY, METERED NASAL at 10:34

## 2017-04-30 RX ADMIN — ASPIRIN 81 MG SCH MG: 81 TABLET ORAL at 10:29

## 2017-05-01 RX ADMIN — AMLODIPINE BESYLATE SCH MG: 10 TABLET ORAL at 10:38

## 2017-05-01 RX ADMIN — FLUTICASONE PROPIONATE SCH SPRAY: 50 SPRAY, METERED NASAL at 21:47

## 2017-05-01 RX ADMIN — INSULIN ASPART SCH UNIT: 100 INJECTION, SOLUTION INTRAVENOUS; SUBCUTANEOUS at 16:48

## 2017-05-01 RX ADMIN — TRAZODONE HYDROCHLORIDE SCH MG: 100 TABLET ORAL at 21:45

## 2017-05-01 RX ADMIN — METFORMIN HYDROCHLORIDE SCH MG: 500 TABLET ORAL at 16:48

## 2017-05-01 RX ADMIN — BUDESONIDE AND FORMOTEROL FUMARATE DIHYDRATE SCH PUFF: 160; 4.5 AEROSOL RESPIRATORY (INHALATION) at 10:39

## 2017-05-01 RX ADMIN — ASPIRIN 81 MG SCH MG: 81 TABLET ORAL at 10:38

## 2017-05-01 RX ADMIN — ROSUVASTATIN CALCIUM SCH MG: 20 TABLET, FILM COATED ORAL at 21:46

## 2017-05-01 RX ADMIN — Medication SCH TAB: at 10:38

## 2017-05-01 RX ADMIN — HYDROCORTISONE SCH APPLIC: 25 CREAM TOPICAL at 10:40

## 2017-05-01 RX ADMIN — LISINOPRIL SCH MG: 10 TABLET ORAL at 10:38

## 2017-05-01 RX ADMIN — TRIAMCINOLONE ACETONIDE SCH APPLIC: 1 CREAM TOPICAL at 10:40

## 2017-05-01 RX ADMIN — DOCUSATE SODIUM SCH MG: 100 CAPSULE, LIQUID FILLED ORAL at 14:17

## 2017-05-01 RX ADMIN — INSULIN ASPART SCH: 100 INJECTION, SOLUTION INTRAVENOUS; SUBCUTANEOUS at 06:23

## 2017-05-01 RX ADMIN — DOCUSATE SODIUM SCH MG: 100 CAPSULE, LIQUID FILLED ORAL at 21:46

## 2017-05-01 RX ADMIN — GABAPENTIN SCH MG: 300 CAPSULE ORAL at 21:46

## 2017-05-01 RX ADMIN — BUDESONIDE AND FORMOTEROL FUMARATE DIHYDRATE SCH PUFF: 160; 4.5 AEROSOL RESPIRATORY (INHALATION) at 21:46

## 2017-05-01 RX ADMIN — TRIAMCINOLONE ACETONIDE SCH APPLIC: 1 CREAM TOPICAL at 21:49

## 2017-05-01 RX ADMIN — FLUTICASONE PROPIONATE SCH SPRAY: 50 SPRAY, METERED NASAL at 10:40

## 2017-05-01 RX ADMIN — GABAPENTIN SCH MG: 300 CAPSULE ORAL at 06:22

## 2017-05-01 RX ADMIN — GABAPENTIN SCH MG: 300 CAPSULE ORAL at 14:17

## 2017-05-01 RX ADMIN — NICOTINE SCH MG: 21 PATCH TRANSDERMAL at 10:39

## 2017-05-01 RX ADMIN — BACITRACIN ZINC SCH GM: 500 OINTMENT TOPICAL at 10:38

## 2017-05-01 RX ADMIN — BACITRACIN ZINC SCH: 500 OINTMENT TOPICAL at 21:49

## 2017-05-01 RX ADMIN — Medication SCH MG: at 21:46

## 2017-05-01 RX ADMIN — HYDROCORTISONE SCH APPLIC: 25 CREAM TOPICAL at 21:48

## 2017-05-01 RX ADMIN — GUAIFENESIN AND DEXTROMETHORPHAN PRN ML: 100; 10 SYRUP ORAL at 12:58

## 2017-05-01 RX ADMIN — METFORMIN HYDROCHLORIDE SCH MG: 500 TABLET ORAL at 06:22

## 2017-05-01 RX ADMIN — DOCUSATE SODIUM SCH MG: 100 CAPSULE, LIQUID FILLED ORAL at 06:22

## 2017-05-02 RX ADMIN — TRAZODONE HYDROCHLORIDE SCH MG: 100 TABLET ORAL at 21:54

## 2017-05-02 RX ADMIN — BUDESONIDE AND FORMOTEROL FUMARATE DIHYDRATE SCH PUFF: 160; 4.5 AEROSOL RESPIRATORY (INHALATION) at 21:54

## 2017-05-02 RX ADMIN — FLUTICASONE PROPIONATE SCH SPRAY: 50 SPRAY, METERED NASAL at 10:34

## 2017-05-02 RX ADMIN — ROSUVASTATIN CALCIUM SCH MG: 20 TABLET, FILM COATED ORAL at 21:54

## 2017-05-02 RX ADMIN — PHENOL PRN EACH: 14.5 LOZENGE ORAL at 14:29

## 2017-05-02 RX ADMIN — TRIAMCINOLONE ACETONIDE SCH APPLIC: 1 CREAM TOPICAL at 10:33

## 2017-05-02 RX ADMIN — INSULIN ASPART SCH UNIT: 100 INJECTION, SOLUTION INTRAVENOUS; SUBCUTANEOUS at 06:23

## 2017-05-02 RX ADMIN — NICOTINE SCH MG: 21 PATCH TRANSDERMAL at 10:35

## 2017-05-02 RX ADMIN — METFORMIN HYDROCHLORIDE SCH MG: 500 TABLET ORAL at 06:23

## 2017-05-02 RX ADMIN — ACETAMINOPHEN PRN MG: 325 TABLET ORAL at 21:56

## 2017-05-02 RX ADMIN — HYDROCORTISONE SCH APPLIC: 25 CREAM TOPICAL at 21:55

## 2017-05-02 RX ADMIN — BACITRACIN ZINC SCH GM: 500 OINTMENT TOPICAL at 10:34

## 2017-05-02 RX ADMIN — BACITRACIN ZINC SCH GM: 500 OINTMENT TOPICAL at 21:55

## 2017-05-02 RX ADMIN — INSULIN ASPART SCH UNIT: 100 INJECTION, SOLUTION INTRAVENOUS; SUBCUTANEOUS at 17:04

## 2017-05-02 RX ADMIN — TRIAMCINOLONE ACETONIDE SCH APPLIC: 1 CREAM TOPICAL at 21:55

## 2017-05-02 RX ADMIN — GABAPENTIN SCH MG: 300 CAPSULE ORAL at 14:28

## 2017-05-02 RX ADMIN — ASPIRIN 81 MG SCH MG: 81 TABLET ORAL at 10:34

## 2017-05-02 RX ADMIN — DOCUSATE SODIUM SCH MG: 100 CAPSULE, LIQUID FILLED ORAL at 05:57

## 2017-05-02 RX ADMIN — DOCUSATE SODIUM SCH MG: 100 CAPSULE, LIQUID FILLED ORAL at 21:56

## 2017-05-02 RX ADMIN — LISINOPRIL SCH MG: 10 TABLET ORAL at 10:34

## 2017-05-02 RX ADMIN — GABAPENTIN SCH MG: 300 CAPSULE ORAL at 21:54

## 2017-05-02 RX ADMIN — HYDROCORTISONE SCH APPLIC: 25 CREAM TOPICAL at 10:33

## 2017-05-02 RX ADMIN — BUDESONIDE AND FORMOTEROL FUMARATE DIHYDRATE SCH PUFF: 160; 4.5 AEROSOL RESPIRATORY (INHALATION) at 10:33

## 2017-05-02 RX ADMIN — FLUTICASONE PROPIONATE SCH SPRAY: 50 SPRAY, METERED NASAL at 21:55

## 2017-05-02 RX ADMIN — Medication SCH MG: at 21:54

## 2017-05-02 RX ADMIN — Medication SCH TAB: at 10:34

## 2017-05-02 RX ADMIN — AMLODIPINE BESYLATE SCH MG: 10 TABLET ORAL at 10:34

## 2017-05-02 RX ADMIN — GABAPENTIN SCH MG: 300 CAPSULE ORAL at 05:57

## 2017-05-02 RX ADMIN — METFORMIN HYDROCHLORIDE SCH MG: 500 TABLET ORAL at 17:03

## 2017-05-03 RX ADMIN — GABAPENTIN SCH MG: 300 CAPSULE ORAL at 06:09

## 2017-05-03 RX ADMIN — INSULIN ASPART SCH: 100 INJECTION, SOLUTION INTRAVENOUS; SUBCUTANEOUS at 17:01

## 2017-05-03 RX ADMIN — GABAPENTIN SCH MG: 300 CAPSULE ORAL at 14:35

## 2017-05-03 RX ADMIN — NICOTINE SCH MG: 21 PATCH TRANSDERMAL at 10:28

## 2017-05-03 RX ADMIN — BUDESONIDE AND FORMOTEROL FUMARATE DIHYDRATE SCH PUFF: 160; 4.5 AEROSOL RESPIRATORY (INHALATION) at 21:37

## 2017-05-03 RX ADMIN — TRIAMCINOLONE ACETONIDE SCH APPLIC: 1 CREAM TOPICAL at 21:37

## 2017-05-03 RX ADMIN — BACITRACIN ZINC SCH GM: 500 OINTMENT TOPICAL at 10:25

## 2017-05-03 RX ADMIN — HYDROCORTISONE SCH APPLIC: 25 CREAM TOPICAL at 21:37

## 2017-05-03 RX ADMIN — HYDROCORTISONE SCH APPLIC: 25 CREAM TOPICAL at 10:25

## 2017-05-03 RX ADMIN — AMLODIPINE BESYLATE SCH MG: 10 TABLET ORAL at 10:25

## 2017-05-03 RX ADMIN — METFORMIN HYDROCHLORIDE SCH MG: 500 TABLET ORAL at 06:09

## 2017-05-03 RX ADMIN — ASPIRIN 81 MG SCH MG: 81 TABLET ORAL at 10:25

## 2017-05-03 RX ADMIN — TRAZODONE HYDROCHLORIDE SCH MG: 100 TABLET ORAL at 21:36

## 2017-05-03 RX ADMIN — BACITRACIN ZINC SCH GM: 500 OINTMENT TOPICAL at 21:37

## 2017-05-03 RX ADMIN — FLUTICASONE PROPIONATE SCH SPRAY: 50 SPRAY, METERED NASAL at 10:26

## 2017-05-03 RX ADMIN — GABAPENTIN SCH MG: 300 CAPSULE ORAL at 21:37

## 2017-05-03 RX ADMIN — TRIAMCINOLONE ACETONIDE SCH APPLIC: 1 CREAM TOPICAL at 10:26

## 2017-05-03 RX ADMIN — Medication SCH TAB: at 10:25

## 2017-05-03 RX ADMIN — BUDESONIDE AND FORMOTEROL FUMARATE DIHYDRATE SCH PUFF: 160; 4.5 AEROSOL RESPIRATORY (INHALATION) at 10:25

## 2017-05-03 RX ADMIN — DOCUSATE SODIUM SCH MG: 100 CAPSULE, LIQUID FILLED ORAL at 21:36

## 2017-05-03 RX ADMIN — ROSUVASTATIN CALCIUM SCH MG: 20 TABLET, FILM COATED ORAL at 21:36

## 2017-05-03 RX ADMIN — METFORMIN HYDROCHLORIDE SCH MG: 500 TABLET ORAL at 17:01

## 2017-05-03 RX ADMIN — ACETAMINOPHEN PRN MG: 325 TABLET ORAL at 21:39

## 2017-05-03 RX ADMIN — INSULIN ASPART SCH: 100 INJECTION, SOLUTION INTRAVENOUS; SUBCUTANEOUS at 06:10

## 2017-05-03 RX ADMIN — LISINOPRIL SCH MG: 10 TABLET ORAL at 10:25

## 2017-05-03 RX ADMIN — ALUMINUM HYDROXIDE, MAGNESIUM HYDROXIDE, AND SIMETHICONE PRN ML: 200; 200; 20 SUSPENSION ORAL at 23:28

## 2017-05-03 RX ADMIN — FLUTICASONE PROPIONATE SCH SPRAY: 50 SPRAY, METERED NASAL at 21:37

## 2017-05-03 RX ADMIN — Medication SCH MG: at 21:36

## 2017-05-04 RX ADMIN — TRIAMCINOLONE ACETONIDE SCH APPLIC: 1 CREAM TOPICAL at 09:59

## 2017-05-04 RX ADMIN — HYDROCORTISONE SCH APPLIC: 25 CREAM TOPICAL at 09:59

## 2017-05-04 RX ADMIN — BACITRACIN ZINC SCH GM: 500 OINTMENT TOPICAL at 21:54

## 2017-05-04 RX ADMIN — GABAPENTIN SCH MG: 300 CAPSULE ORAL at 06:12

## 2017-05-04 RX ADMIN — INSULIN ASPART SCH UNIT: 100 INJECTION, SOLUTION INTRAVENOUS; SUBCUTANEOUS at 16:40

## 2017-05-04 RX ADMIN — HYDROCORTISONE SCH: 25 CREAM TOPICAL at 21:58

## 2017-05-04 RX ADMIN — GABAPENTIN SCH MG: 300 CAPSULE ORAL at 21:54

## 2017-05-04 RX ADMIN — TRAZODONE HYDROCHLORIDE SCH MG: 100 TABLET ORAL at 21:54

## 2017-05-04 RX ADMIN — ALUMINUM HYDROXIDE, MAGNESIUM HYDROXIDE, AND SIMETHICONE PRN ML: 200; 200; 20 SUSPENSION ORAL at 23:10

## 2017-05-04 RX ADMIN — FLUTICASONE PROPIONATE SCH SPRAY: 50 SPRAY, METERED NASAL at 21:57

## 2017-05-04 RX ADMIN — TRIAMCINOLONE ACETONIDE SCH: 1 CREAM TOPICAL at 21:58

## 2017-05-04 RX ADMIN — ROSUVASTATIN CALCIUM SCH MG: 20 TABLET, FILM COATED ORAL at 21:54

## 2017-05-04 RX ADMIN — BACITRACIN ZINC SCH GM: 500 OINTMENT TOPICAL at 09:58

## 2017-05-04 RX ADMIN — METFORMIN HYDROCHLORIDE SCH MG: 500 TABLET ORAL at 16:39

## 2017-05-04 RX ADMIN — AMLODIPINE BESYLATE SCH MG: 10 TABLET ORAL at 09:58

## 2017-05-04 RX ADMIN — LISINOPRIL SCH MG: 10 TABLET ORAL at 09:58

## 2017-05-04 RX ADMIN — Medication SCH TAB: at 09:58

## 2017-05-04 RX ADMIN — BUDESONIDE AND FORMOTEROL FUMARATE DIHYDRATE SCH PUFF: 160; 4.5 AEROSOL RESPIRATORY (INHALATION) at 09:58

## 2017-05-04 RX ADMIN — GUAIFENESIN AND DEXTROMETHORPHAN PRN ML: 100; 10 SYRUP ORAL at 22:00

## 2017-05-04 RX ADMIN — BUDESONIDE AND FORMOTEROL FUMARATE DIHYDRATE SCH PUFF: 160; 4.5 AEROSOL RESPIRATORY (INHALATION) at 21:57

## 2017-05-04 RX ADMIN — METFORMIN HYDROCHLORIDE SCH MG: 500 TABLET ORAL at 06:12

## 2017-05-04 RX ADMIN — INSULIN ASPART SCH: 100 INJECTION, SOLUTION INTRAVENOUS; SUBCUTANEOUS at 06:13

## 2017-05-04 RX ADMIN — Medication SCH MG: at 21:54

## 2017-05-04 RX ADMIN — FLUTICASONE PROPIONATE SCH SPRAY: 50 SPRAY, METERED NASAL at 09:59

## 2017-05-04 RX ADMIN — ASPIRIN 81 MG SCH MG: 81 TABLET ORAL at 09:58

## 2017-05-04 RX ADMIN — DOCUSATE SODIUM SCH MG: 100 CAPSULE, LIQUID FILLED ORAL at 21:55

## 2017-05-04 RX ADMIN — NICOTINE SCH MG: 21 PATCH TRANSDERMAL at 10:00

## 2017-05-04 RX ADMIN — GABAPENTIN SCH MG: 300 CAPSULE ORAL at 14:25

## 2017-05-05 RX ADMIN — HYDROCORTISONE SCH APPLIC: 25 CREAM TOPICAL at 10:11

## 2017-05-05 RX ADMIN — AMLODIPINE BESYLATE SCH MG: 10 TABLET ORAL at 10:10

## 2017-05-05 RX ADMIN — ALBUTEROL SULFATE PRN PUFF: 90 AEROSOL, METERED RESPIRATORY (INHALATION) at 06:38

## 2017-05-05 RX ADMIN — INSULIN ASPART SCH UNIT: 100 INJECTION, SOLUTION INTRAVENOUS; SUBCUTANEOUS at 16:36

## 2017-05-05 RX ADMIN — GABAPENTIN SCH MG: 300 CAPSULE ORAL at 14:15

## 2017-05-05 RX ADMIN — TRIAMCINOLONE ACETONIDE SCH APPLIC: 1 CREAM TOPICAL at 21:51

## 2017-05-05 RX ADMIN — BACITRACIN ZINC SCH GM: 500 OINTMENT TOPICAL at 21:49

## 2017-05-05 RX ADMIN — FLUTICASONE PROPIONATE SCH SPRAY: 50 SPRAY, METERED NASAL at 10:11

## 2017-05-05 RX ADMIN — HYDROCORTISONE SCH: 25 CREAM TOPICAL at 21:51

## 2017-05-05 RX ADMIN — TRAZODONE HYDROCHLORIDE SCH MG: 100 TABLET ORAL at 21:48

## 2017-05-05 RX ADMIN — INSULIN ASPART SCH: 100 INJECTION, SOLUTION INTRAVENOUS; SUBCUTANEOUS at 06:36

## 2017-05-05 RX ADMIN — BUDESONIDE AND FORMOTEROL FUMARATE DIHYDRATE SCH PUFF: 160; 4.5 AEROSOL RESPIRATORY (INHALATION) at 10:11

## 2017-05-05 RX ADMIN — DOCUSATE SODIUM SCH MG: 100 CAPSULE, LIQUID FILLED ORAL at 21:48

## 2017-05-05 RX ADMIN — BUDESONIDE AND FORMOTEROL FUMARATE DIHYDRATE SCH PUFF: 160; 4.5 AEROSOL RESPIRATORY (INHALATION) at 21:50

## 2017-05-05 RX ADMIN — GABAPENTIN SCH MG: 300 CAPSULE ORAL at 21:49

## 2017-05-05 RX ADMIN — Medication SCH TAB: at 10:10

## 2017-05-05 RX ADMIN — Medication SCH MG: at 21:48

## 2017-05-05 RX ADMIN — NICOTINE SCH MG: 21 PATCH TRANSDERMAL at 10:12

## 2017-05-05 RX ADMIN — BACITRACIN ZINC SCH GM: 500 OINTMENT TOPICAL at 10:10

## 2017-05-05 RX ADMIN — ASPIRIN 81 MG SCH MG: 81 TABLET ORAL at 10:10

## 2017-05-05 RX ADMIN — GABAPENTIN SCH MG: 300 CAPSULE ORAL at 06:36

## 2017-05-05 RX ADMIN — FLUTICASONE PROPIONATE SCH SPRAY: 50 SPRAY, METERED NASAL at 21:49

## 2017-05-05 RX ADMIN — METFORMIN HYDROCHLORIDE SCH MG: 500 TABLET ORAL at 16:35

## 2017-05-05 RX ADMIN — TRIAMCINOLONE ACETONIDE SCH APPLIC: 1 CREAM TOPICAL at 10:10

## 2017-05-05 RX ADMIN — ROSUVASTATIN CALCIUM SCH MG: 20 TABLET, FILM COATED ORAL at 21:48

## 2017-05-05 RX ADMIN — LISINOPRIL SCH MG: 10 TABLET ORAL at 10:10

## 2017-05-05 RX ADMIN — METFORMIN HYDROCHLORIDE SCH MG: 500 TABLET ORAL at 06:36

## 2017-05-06 RX ADMIN — INSULIN ASPART SCH: 100 INJECTION, SOLUTION INTRAVENOUS; SUBCUTANEOUS at 06:08

## 2017-05-06 RX ADMIN — BUDESONIDE AND FORMOTEROL FUMARATE DIHYDRATE SCH PUFF: 160; 4.5 AEROSOL RESPIRATORY (INHALATION) at 21:52

## 2017-05-06 RX ADMIN — GABAPENTIN SCH MG: 300 CAPSULE ORAL at 14:22

## 2017-05-06 RX ADMIN — TRIAMCINOLONE ACETONIDE SCH APPLIC: 1 CREAM TOPICAL at 10:32

## 2017-05-06 RX ADMIN — GABAPENTIN SCH MG: 300 CAPSULE ORAL at 06:08

## 2017-05-06 RX ADMIN — FLUTICASONE PROPIONATE SCH SPRAY: 50 SPRAY, METERED NASAL at 10:32

## 2017-05-06 RX ADMIN — BACITRACIN ZINC SCH GM: 500 OINTMENT TOPICAL at 21:49

## 2017-05-06 RX ADMIN — NICOTINE SCH MG: 21 PATCH TRANSDERMAL at 10:32

## 2017-05-06 RX ADMIN — Medication SCH TAB: at 10:30

## 2017-05-06 RX ADMIN — ROSUVASTATIN CALCIUM SCH MG: 20 TABLET, FILM COATED ORAL at 21:49

## 2017-05-06 RX ADMIN — HYDROCORTISONE SCH APPLIC: 25 CREAM TOPICAL at 21:51

## 2017-05-06 RX ADMIN — DOCUSATE SODIUM SCH MG: 100 CAPSULE, LIQUID FILLED ORAL at 21:49

## 2017-05-06 RX ADMIN — METFORMIN HYDROCHLORIDE SCH MG: 500 TABLET ORAL at 06:08

## 2017-05-06 RX ADMIN — HYDROCORTISONE SCH APPLIC: 25 CREAM TOPICAL at 10:32

## 2017-05-06 RX ADMIN — INSULIN ASPART SCH UNIT: 100 INJECTION, SOLUTION INTRAVENOUS; SUBCUTANEOUS at 16:37

## 2017-05-06 RX ADMIN — TRIAMCINOLONE ACETONIDE SCH APPLIC: 1 CREAM TOPICAL at 21:51

## 2017-05-06 RX ADMIN — TRAZODONE HYDROCHLORIDE SCH MG: 100 TABLET ORAL at 21:49

## 2017-05-06 RX ADMIN — LISINOPRIL SCH MG: 10 TABLET ORAL at 10:30

## 2017-05-06 RX ADMIN — BUDESONIDE AND FORMOTEROL FUMARATE DIHYDRATE SCH PUFF: 160; 4.5 AEROSOL RESPIRATORY (INHALATION) at 10:32

## 2017-05-06 RX ADMIN — FLUTICASONE PROPIONATE SCH SPRAY: 50 SPRAY, METERED NASAL at 21:50

## 2017-05-06 RX ADMIN — GABAPENTIN SCH MG: 300 CAPSULE ORAL at 21:49

## 2017-05-06 RX ADMIN — BACITRACIN ZINC SCH GM: 500 OINTMENT TOPICAL at 10:30

## 2017-05-06 RX ADMIN — ASPIRIN 81 MG SCH MG: 81 TABLET ORAL at 10:30

## 2017-05-06 RX ADMIN — Medication SCH MG: at 21:49

## 2017-05-06 RX ADMIN — AMLODIPINE BESYLATE SCH MG: 10 TABLET ORAL at 10:30

## 2017-05-06 RX ADMIN — METFORMIN HYDROCHLORIDE SCH MG: 500 TABLET ORAL at 16:39

## 2017-05-07 RX ADMIN — BACITRACIN ZINC SCH GM: 500 OINTMENT TOPICAL at 21:39

## 2017-05-07 RX ADMIN — TRAZODONE HYDROCHLORIDE SCH MG: 100 TABLET ORAL at 21:39

## 2017-05-07 RX ADMIN — GABAPENTIN SCH MG: 300 CAPSULE ORAL at 21:38

## 2017-05-07 RX ADMIN — LISINOPRIL SCH MG: 10 TABLET ORAL at 10:27

## 2017-05-07 RX ADMIN — INSULIN ASPART SCH: 100 INJECTION, SOLUTION INTRAVENOUS; SUBCUTANEOUS at 06:24

## 2017-05-07 RX ADMIN — BUDESONIDE AND FORMOTEROL FUMARATE DIHYDRATE SCH PUFF: 160; 4.5 AEROSOL RESPIRATORY (INHALATION) at 21:38

## 2017-05-07 RX ADMIN — DOCUSATE SODIUM SCH MG: 100 CAPSULE, LIQUID FILLED ORAL at 21:38

## 2017-05-07 RX ADMIN — AMLODIPINE BESYLATE SCH MG: 10 TABLET ORAL at 10:28

## 2017-05-07 RX ADMIN — BUDESONIDE AND FORMOTEROL FUMARATE DIHYDRATE SCH PUFF: 160; 4.5 AEROSOL RESPIRATORY (INHALATION) at 10:27

## 2017-05-07 RX ADMIN — NICOTINE SCH MG: 21 PATCH TRANSDERMAL at 10:28

## 2017-05-07 RX ADMIN — TRIAMCINOLONE ACETONIDE SCH APPLIC: 1 CREAM TOPICAL at 21:41

## 2017-05-07 RX ADMIN — FLUTICASONE PROPIONATE SCH SPRAY: 50 SPRAY, METERED NASAL at 21:40

## 2017-05-07 RX ADMIN — BACITRACIN ZINC SCH GM: 500 OINTMENT TOPICAL at 10:28

## 2017-05-07 RX ADMIN — Medication SCH TAB: at 10:28

## 2017-05-07 RX ADMIN — TRIAMCINOLONE ACETONIDE SCH APPLIC: 1 CREAM TOPICAL at 10:29

## 2017-05-07 RX ADMIN — HYDROCORTISONE SCH APPLIC: 25 CREAM TOPICAL at 21:40

## 2017-05-07 RX ADMIN — METFORMIN HYDROCHLORIDE SCH MG: 500 TABLET ORAL at 16:43

## 2017-05-07 RX ADMIN — ROSUVASTATIN CALCIUM SCH MG: 20 TABLET, FILM COATED ORAL at 21:38

## 2017-05-07 RX ADMIN — Medication SCH MG: at 21:39

## 2017-05-07 RX ADMIN — FLUTICASONE PROPIONATE SCH SPRAY: 50 SPRAY, METERED NASAL at 10:28

## 2017-05-07 RX ADMIN — HYDROCORTISONE SCH APPLIC: 25 CREAM TOPICAL at 10:29

## 2017-05-07 RX ADMIN — GABAPENTIN SCH MG: 300 CAPSULE ORAL at 13:41

## 2017-05-07 RX ADMIN — METFORMIN HYDROCHLORIDE SCH MG: 500 TABLET ORAL at 06:17

## 2017-05-07 RX ADMIN — ASPIRIN 81 MG SCH MG: 81 TABLET ORAL at 10:27

## 2017-05-07 RX ADMIN — INSULIN ASPART SCH UNIT: 100 INJECTION, SOLUTION INTRAVENOUS; SUBCUTANEOUS at 16:41

## 2017-05-07 RX ADMIN — GABAPENTIN SCH MG: 300 CAPSULE ORAL at 06:17

## 2017-05-07 RX ADMIN — ACETAMINOPHEN PRN MG: 325 TABLET ORAL at 06:19

## 2017-05-08 RX ADMIN — NICOTINE SCH MG: 21 PATCH TRANSDERMAL at 10:39

## 2017-05-08 RX ADMIN — INSULIN ASPART SCH: 100 INJECTION, SOLUTION INTRAVENOUS; SUBCUTANEOUS at 06:22

## 2017-05-08 RX ADMIN — BACITRACIN ZINC SCH GM: 500 OINTMENT TOPICAL at 21:47

## 2017-05-08 RX ADMIN — Medication SCH MG: at 21:50

## 2017-05-08 RX ADMIN — TRIAMCINOLONE ACETONIDE SCH: 1 CREAM TOPICAL at 21:49

## 2017-05-08 RX ADMIN — BUDESONIDE AND FORMOTEROL FUMARATE DIHYDRATE SCH PUFF: 160; 4.5 AEROSOL RESPIRATORY (INHALATION) at 21:46

## 2017-05-08 RX ADMIN — GABAPENTIN SCH MG: 300 CAPSULE ORAL at 06:21

## 2017-05-08 RX ADMIN — BACITRACIN ZINC SCH GM: 500 OINTMENT TOPICAL at 10:38

## 2017-05-08 RX ADMIN — GABAPENTIN SCH MG: 300 CAPSULE ORAL at 13:59

## 2017-05-08 RX ADMIN — METFORMIN HYDROCHLORIDE SCH MG: 500 TABLET ORAL at 06:21

## 2017-05-08 RX ADMIN — FLUTICASONE PROPIONATE SCH SPRAY: 50 SPRAY, METERED NASAL at 21:46

## 2017-05-08 RX ADMIN — HYDROCORTISONE SCH: 25 CREAM TOPICAL at 10:39

## 2017-05-08 RX ADMIN — ACETAMINOPHEN PRN MG: 325 TABLET ORAL at 07:24

## 2017-05-08 RX ADMIN — ALBUTEROL SULFATE PRN PUFF: 90 AEROSOL, METERED RESPIRATORY (INHALATION) at 11:55

## 2017-05-08 RX ADMIN — DOCUSATE SODIUM SCH MG: 100 CAPSULE, LIQUID FILLED ORAL at 21:47

## 2017-05-08 RX ADMIN — HYDROCORTISONE SCH APPLIC: 25 CREAM TOPICAL at 21:47

## 2017-05-08 RX ADMIN — TRIAMCINOLONE ACETONIDE SCH APPLIC: 1 CREAM TOPICAL at 10:39

## 2017-05-08 RX ADMIN — GABAPENTIN SCH MG: 300 CAPSULE ORAL at 21:47

## 2017-05-08 RX ADMIN — Medication SCH TAB: at 10:38

## 2017-05-08 RX ADMIN — ROSUVASTATIN CALCIUM SCH MG: 20 TABLET, FILM COATED ORAL at 21:47

## 2017-05-08 RX ADMIN — TRAZODONE HYDROCHLORIDE SCH MG: 100 TABLET ORAL at 21:47

## 2017-05-08 RX ADMIN — BUDESONIDE AND FORMOTEROL FUMARATE DIHYDRATE SCH PUFF: 160; 4.5 AEROSOL RESPIRATORY (INHALATION) at 10:38

## 2017-05-08 RX ADMIN — IBUPROFEN PRN MG: 400 TABLET, FILM COATED ORAL at 10:42

## 2017-05-08 RX ADMIN — AMLODIPINE BESYLATE SCH MG: 10 TABLET ORAL at 10:38

## 2017-05-08 RX ADMIN — METFORMIN HYDROCHLORIDE SCH MG: 500 TABLET ORAL at 16:51

## 2017-05-08 RX ADMIN — INSULIN ASPART SCH UNIT: 100 INJECTION, SOLUTION INTRAVENOUS; SUBCUTANEOUS at 16:50

## 2017-05-08 RX ADMIN — FLUTICASONE PROPIONATE SCH SPRAY: 50 SPRAY, METERED NASAL at 10:38

## 2017-05-08 RX ADMIN — ASPIRIN 81 MG SCH MG: 81 TABLET ORAL at 10:38

## 2017-05-08 RX ADMIN — LISINOPRIL SCH MG: 10 TABLET ORAL at 10:38

## 2017-05-09 RX ADMIN — GABAPENTIN SCH MG: 300 CAPSULE ORAL at 14:10

## 2017-05-09 RX ADMIN — IBUPROFEN PRN MG: 400 TABLET, FILM COATED ORAL at 14:38

## 2017-05-09 RX ADMIN — IBUPROFEN PRN MG: 400 TABLET, FILM COATED ORAL at 21:43

## 2017-05-09 RX ADMIN — INSULIN ASPART SCH: 100 INJECTION, SOLUTION INTRAVENOUS; SUBCUTANEOUS at 06:14

## 2017-05-09 RX ADMIN — GABAPENTIN SCH MG: 300 CAPSULE ORAL at 06:13

## 2017-05-09 RX ADMIN — LISINOPRIL SCH MG: 10 TABLET ORAL at 10:24

## 2017-05-09 RX ADMIN — INSULIN ASPART SCH UNIT: 100 INJECTION, SOLUTION INTRAVENOUS; SUBCUTANEOUS at 16:59

## 2017-05-09 RX ADMIN — ROSUVASTATIN CALCIUM SCH MG: 20 TABLET, FILM COATED ORAL at 21:42

## 2017-05-09 RX ADMIN — BACITRACIN ZINC SCH GM: 500 OINTMENT TOPICAL at 10:24

## 2017-05-09 RX ADMIN — DOCUSATE SODIUM SCH MG: 100 CAPSULE, LIQUID FILLED ORAL at 21:42

## 2017-05-09 RX ADMIN — TRIAMCINOLONE ACETONIDE SCH APPLIC: 1 CREAM TOPICAL at 21:42

## 2017-05-09 RX ADMIN — TRAZODONE HYDROCHLORIDE SCH MG: 100 TABLET ORAL at 21:42

## 2017-05-09 RX ADMIN — FLUTICASONE PROPIONATE SCH SPRAY: 50 SPRAY, METERED NASAL at 21:42

## 2017-05-09 RX ADMIN — FLUTICASONE PROPIONATE SCH SPRAY: 50 SPRAY, METERED NASAL at 10:23

## 2017-05-09 RX ADMIN — ASPIRIN 81 MG SCH MG: 81 TABLET ORAL at 10:24

## 2017-05-09 RX ADMIN — Medication SCH MG: at 21:42

## 2017-05-09 RX ADMIN — BUDESONIDE AND FORMOTEROL FUMARATE DIHYDRATE SCH PUFF: 160; 4.5 AEROSOL RESPIRATORY (INHALATION) at 21:42

## 2017-05-09 RX ADMIN — AMLODIPINE BESYLATE SCH MG: 10 TABLET ORAL at 10:24

## 2017-05-09 RX ADMIN — IBUPROFEN PRN MG: 400 TABLET, FILM COATED ORAL at 06:13

## 2017-05-09 RX ADMIN — HYDROCORTISONE SCH APPLIC: 25 CREAM TOPICAL at 10:25

## 2017-05-09 RX ADMIN — DOXYCYCLINE HYCLATE SCH MG: 100 TABLET, COATED ORAL at 18:04

## 2017-05-09 RX ADMIN — GABAPENTIN SCH MG: 300 CAPSULE ORAL at 21:42

## 2017-05-09 RX ADMIN — METFORMIN HYDROCHLORIDE SCH MG: 500 TABLET ORAL at 06:13

## 2017-05-09 RX ADMIN — METFORMIN HYDROCHLORIDE SCH MG: 500 TABLET ORAL at 16:47

## 2017-05-09 RX ADMIN — BUDESONIDE AND FORMOTEROL FUMARATE DIHYDRATE SCH PUFF: 160; 4.5 AEROSOL RESPIRATORY (INHALATION) at 10:23

## 2017-05-09 RX ADMIN — BACITRACIN ZINC SCH GM: 500 OINTMENT TOPICAL at 21:44

## 2017-05-09 RX ADMIN — HYDROCORTISONE SCH APPLIC: 25 CREAM TOPICAL at 21:42

## 2017-05-09 RX ADMIN — NICOTINE SCH MG: 21 PATCH TRANSDERMAL at 10:24

## 2017-05-09 RX ADMIN — ACETAMINOPHEN PRN MG: 325 TABLET ORAL at 16:48

## 2017-05-09 RX ADMIN — TRIAMCINOLONE ACETONIDE SCH APPLIC: 1 CREAM TOPICAL at 10:23

## 2017-05-09 RX ADMIN — Medication SCH TAB: at 10:24

## 2017-05-09 NOTE — PN
[facility] Progress Note (SOAP)


Subjective: 


rt inquinal abscess 2 raised 


Objective: 





05/09/17 12:14


 Vital Signs











Temperature  97.3 F L  05/09/17 06:43


 


Pulse Rate  101 H  05/09/17 06:43


 


Respiratory Rate  20   05/09/17 06:43


 


Blood Pressure  135/89   05/09/17 06:43


 


O2 Sat by Pulse Oximetry (%)      








 Laboratory Tests











  04/17/17 04/18/17 04/18/17





  16:27 06:18 17:04


 


POC Glucometer  129  188  184














  04/19/17 04/19/17 04/20/17





  06:33 17:06 06:15


 


POC Glucometer  137  197  173














  04/20/17 04/21/17 04/21/17





  17:04 06:25 17:03


 


POC Glucometer  208  136  269














  04/22/17 04/22/17 04/23/17





  05:55 16:26 06:38


 


POC Glucometer  122  235  122














  04/23/17 04/24/17 04/24/17





  16:32 06:22 16:39


 


POC Glucometer  167  130  180














  04/25/17 04/25/17 04/26/17





  05:39 16:45 06:27


 


POC Glucometer  136  202  122














  04/26/17 04/27/17 04/27/17





  16:42 06:03 16:41


 


POC Glucometer  151  223  179














  04/28/17 04/28/17 04/29/17





  06:00 16:37 06:15


 


POC Glucometer  105  191  136














  04/29/17 04/30/17 04/30/17





  16:58 06:41 16:31


 


POC Glucometer  150  118  199














  05/01/17 05/01/17 05/02/17





  05:06 16:46 05:55


 


POC Glucometer  120  172  172














  05/02/17 05/03/17 05/03/17





  17:02 06:08 17:00


 


POC Glucometer  164  150  138














  05/04/17 05/04/17 05/05/17





  06:11 16:36 06:35


 


POC Glucometer  134  170  108














  05/05/17 05/06/17 05/06/17





  16:24 06:07 16:35


 


POC Glucometer  184  132  210














  05/07/17 05/07/17 05/08/17





  06:16 16:39 06:20


 


POC Glucometer  124  155  120














  05/08/17 05/09/17





  16:48 06:11


 


POC Glucometer  199  121








 Laboratory Tests











  04/17/17 04/18/17 04/18/17





  16:27 06:18 17:04


 


POC Glucometer  129  188  184














  04/19/17 04/19/17 04/20/17





  06:33 17:06 06:15


 


POC Glucometer  137  197  173














  04/20/17 04/21/17 04/21/17





  17:04 06:25 17:03


 


POC Glucometer  208  136  269














  04/22/17 04/22/17 04/23/17





  05:55 16:26 06:38


 


POC Glucometer  122  235  122














  04/23/17 04/24/17 04/24/17





  16:32 06:22 16:39


 


POC Glucometer  167  130  180














  04/25/17 04/25/17 04/26/17





  05:39 16:45 06:27


 


POC Glucometer  136  202  122














  04/26/17 04/27/17 04/27/17





  16:42 06:03 16:41


 


POC Glucometer  151  223  179














  04/28/17 04/28/17 04/29/17





  06:00 16:37 06:15


 


POC Glucometer  105  191  136














  04/29/17 04/30/17 04/30/17





  16:58 06:41 16:31


 


POC Glucometer  150  118  199














  05/01/17 05/01/17 05/02/17





  05:06 16:46 05:55


 


POC Glucometer  120  172  172














  05/02/17 05/03/17 05/03/17





  17:02 06:08 17:00


 


POC Glucometer  164  150  138














  05/04/17 05/04/17 05/05/17





  06:11 16:36 06:35


 


POC Glucometer  134  170  108














  05/05/17 05/06/17 05/06/17





  16:24 06:07 16:35


 


POC Glucometer  184  132  210














  05/07/17 05/07/17 05/08/17





  06:16 16:39 06:20


 


POC Glucometer  124  155  120














  05/08/17 05/09/17





  16:48 06:11


 


POC Glucometer  199  121








raised paapules rt groin 


Assessment: 





05/09/17 12:15





Plan: 


doxycycline 100mg bid 


motrin 400mg tid 


rpr


cbc 


u/a 


basic met.

## 2017-05-10 LAB
ANION GAP SERPL CALC-SCNC: 9 MMOL/L (ref 8–16)
BASOPHILS # BLD: 0.6 % (ref 0–2)
CALCIUM SERPL-MCNC: 9.1 MG/DL (ref 8.5–10.1)
CO2 SERPL-SCNC: 29 MMOL/L (ref 21–32)
COCKROFT - GAULT: 140.56
CREAT SERPL-MCNC: 0.8 MG/DL (ref 0.7–1.3)
DEPRECATED RDW RBC AUTO: 14.3 % (ref 11.9–15.9)
EOSINOPHIL # BLD: 2.6 % (ref 0–4.5)
GLUCOSE SERPL-MCNC: 121 MG/DL (ref 74–106)
MCH RBC QN AUTO: 30.4 PG (ref 25.7–33.7)
MCHC RBC AUTO-ENTMCNC: 32.3 G/DL (ref 32–35.9)
MCV RBC: 94.3 FL (ref 80–96)
NEUTROPHILS # BLD: 58.1 % (ref 42.8–82.8)
PLATELET # BLD AUTO: 336 K/MM3 (ref 134–434)
PMV BLD: 8.1 FL (ref 7.5–11.1)
WBC # BLD AUTO: 8.5 K/MM3 (ref 4–10)

## 2017-05-10 RX ADMIN — METFORMIN HYDROCHLORIDE SCH MG: 500 TABLET ORAL at 17:25

## 2017-05-10 RX ADMIN — GABAPENTIN SCH MG: 300 CAPSULE ORAL at 14:20

## 2017-05-10 RX ADMIN — INSULIN ASPART SCH: 100 INJECTION, SOLUTION INTRAVENOUS; SUBCUTANEOUS at 06:08

## 2017-05-10 RX ADMIN — INSULIN ASPART SCH UNIT: 100 INJECTION, SOLUTION INTRAVENOUS; SUBCUTANEOUS at 17:29

## 2017-05-10 RX ADMIN — GABAPENTIN SCH MG: 300 CAPSULE ORAL at 06:08

## 2017-05-10 RX ADMIN — BUDESONIDE AND FORMOTEROL FUMARATE DIHYDRATE SCH PUFF: 160; 4.5 AEROSOL RESPIRATORY (INHALATION) at 21:52

## 2017-05-10 RX ADMIN — BACITRACIN ZINC SCH GM: 500 OINTMENT TOPICAL at 21:51

## 2017-05-10 RX ADMIN — BUDESONIDE AND FORMOTEROL FUMARATE DIHYDRATE SCH PUFF: 160; 4.5 AEROSOL RESPIRATORY (INHALATION) at 10:25

## 2017-05-10 RX ADMIN — TRIAMCINOLONE ACETONIDE SCH APPLIC: 1 CREAM TOPICAL at 21:51

## 2017-05-10 RX ADMIN — Medication SCH MG: at 21:47

## 2017-05-10 RX ADMIN — ASPIRIN 81 MG SCH MG: 81 TABLET ORAL at 10:26

## 2017-05-10 RX ADMIN — AMLODIPINE BESYLATE SCH MG: 10 TABLET ORAL at 10:26

## 2017-05-10 RX ADMIN — LISINOPRIL SCH MG: 10 TABLET ORAL at 10:26

## 2017-05-10 RX ADMIN — Medication SCH TAB: at 10:26

## 2017-05-10 RX ADMIN — ALUMINUM HYDROXIDE, MAGNESIUM HYDROXIDE, AND SIMETHICONE PRN ML: 200; 200; 20 SUSPENSION ORAL at 00:28

## 2017-05-10 RX ADMIN — NICOTINE SCH MG: 21 PATCH TRANSDERMAL at 10:27

## 2017-05-10 RX ADMIN — TRIAMCINOLONE ACETONIDE SCH APPLIC: 1 CREAM TOPICAL at 10:26

## 2017-05-10 RX ADMIN — DOXYCYCLINE HYCLATE SCH MG: 100 TABLET, COATED ORAL at 17:25

## 2017-05-10 RX ADMIN — FLUTICASONE PROPIONATE SCH SPRAY: 50 SPRAY, METERED NASAL at 21:50

## 2017-05-10 RX ADMIN — GABAPENTIN SCH MG: 300 CAPSULE ORAL at 21:47

## 2017-05-10 RX ADMIN — FLUTICASONE PROPIONATE SCH SPRAY: 50 SPRAY, METERED NASAL at 10:26

## 2017-05-10 RX ADMIN — METFORMIN HYDROCHLORIDE SCH MG: 500 TABLET ORAL at 06:08

## 2017-05-10 RX ADMIN — DOCUSATE SODIUM SCH MG: 100 CAPSULE, LIQUID FILLED ORAL at 21:47

## 2017-05-10 RX ADMIN — BACITRACIN ZINC SCH GM: 500 OINTMENT TOPICAL at 10:26

## 2017-05-10 RX ADMIN — IBUPROFEN PRN MG: 400 TABLET, FILM COATED ORAL at 21:48

## 2017-05-10 RX ADMIN — HYDROCORTISONE SCH APPLIC: 25 CREAM TOPICAL at 21:50

## 2017-05-10 RX ADMIN — TRAZODONE HYDROCHLORIDE SCH MG: 100 TABLET ORAL at 21:47

## 2017-05-10 RX ADMIN — DOXYCYCLINE HYCLATE SCH MG: 100 TABLET, COATED ORAL at 10:26

## 2017-05-10 RX ADMIN — ROSUVASTATIN CALCIUM SCH MG: 20 TABLET, FILM COATED ORAL at 21:49

## 2017-05-10 RX ADMIN — HYDROCORTISONE SCH APPLIC: 25 CREAM TOPICAL at 10:26

## 2017-05-10 RX ADMIN — ACETAMINOPHEN PRN MG: 325 TABLET ORAL at 10:28

## 2017-05-11 RX ADMIN — GABAPENTIN SCH MG: 300 CAPSULE ORAL at 21:46

## 2017-05-11 RX ADMIN — Medication SCH MG: at 21:45

## 2017-05-11 RX ADMIN — TRAZODONE HYDROCHLORIDE SCH MG: 100 TABLET ORAL at 21:46

## 2017-05-11 RX ADMIN — INSULIN ASPART SCH: 100 INJECTION, SOLUTION INTRAVENOUS; SUBCUTANEOUS at 06:10

## 2017-05-11 RX ADMIN — HYDROCORTISONE SCH APPLIC: 25 CREAM TOPICAL at 10:54

## 2017-05-11 RX ADMIN — GABAPENTIN SCH MG: 300 CAPSULE ORAL at 06:08

## 2017-05-11 RX ADMIN — BACITRACIN ZINC SCH GM: 500 OINTMENT TOPICAL at 21:46

## 2017-05-11 RX ADMIN — DOCUSATE SODIUM SCH MG: 100 CAPSULE, LIQUID FILLED ORAL at 21:45

## 2017-05-11 RX ADMIN — GABAPENTIN SCH MG: 300 CAPSULE ORAL at 14:10

## 2017-05-11 RX ADMIN — METFORMIN HYDROCHLORIDE SCH MG: 500 TABLET ORAL at 06:08

## 2017-05-11 RX ADMIN — FLUTICASONE PROPIONATE SCH SPRAY: 50 SPRAY, METERED NASAL at 10:52

## 2017-05-11 RX ADMIN — BUDESONIDE AND FORMOTEROL FUMARATE DIHYDRATE SCH PUFF: 160; 4.5 AEROSOL RESPIRATORY (INHALATION) at 10:52

## 2017-05-11 RX ADMIN — HYDROCORTISONE SCH APPLIC: 25 CREAM TOPICAL at 21:47

## 2017-05-11 RX ADMIN — INSULIN ASPART SCH UNIT: 100 INJECTION, SOLUTION INTRAVENOUS; SUBCUTANEOUS at 16:41

## 2017-05-11 RX ADMIN — BUDESONIDE AND FORMOTEROL FUMARATE DIHYDRATE SCH PUFF: 160; 4.5 AEROSOL RESPIRATORY (INHALATION) at 21:45

## 2017-05-11 RX ADMIN — DOXYCYCLINE HYCLATE SCH MG: 100 TABLET, COATED ORAL at 10:53

## 2017-05-11 RX ADMIN — ACETAMINOPHEN PRN MG: 325 TABLET ORAL at 06:09

## 2017-05-11 RX ADMIN — DOXYCYCLINE HYCLATE SCH MG: 100 TABLET, COATED ORAL at 17:57

## 2017-05-11 RX ADMIN — Medication SCH TAB: at 10:53

## 2017-05-11 RX ADMIN — FLUTICASONE PROPIONATE SCH SPRAY: 50 SPRAY, METERED NASAL at 21:47

## 2017-05-11 RX ADMIN — ASPIRIN 81 MG SCH MG: 81 TABLET ORAL at 10:53

## 2017-05-11 RX ADMIN — NICOTINE SCH MG: 21 PATCH TRANSDERMAL at 10:53

## 2017-05-11 RX ADMIN — TRIAMCINOLONE ACETONIDE SCH APPLIC: 1 CREAM TOPICAL at 21:47

## 2017-05-11 RX ADMIN — LISINOPRIL SCH MG: 10 TABLET ORAL at 10:53

## 2017-05-11 RX ADMIN — AMLODIPINE BESYLATE SCH MG: 10 TABLET ORAL at 10:53

## 2017-05-11 RX ADMIN — ROSUVASTATIN CALCIUM SCH MG: 20 TABLET, FILM COATED ORAL at 21:48

## 2017-05-11 RX ADMIN — TRIAMCINOLONE ACETONIDE SCH APPLIC: 1 CREAM TOPICAL at 10:54

## 2017-05-11 RX ADMIN — METFORMIN HYDROCHLORIDE SCH MG: 500 TABLET ORAL at 16:39

## 2017-05-11 RX ADMIN — ALUMINUM HYDROXIDE, MAGNESIUM HYDROXIDE, AND SIMETHICONE PRN ML: 200; 200; 20 SUSPENSION ORAL at 01:09

## 2017-05-11 RX ADMIN — BACITRACIN ZINC SCH GM: 500 OINTMENT TOPICAL at 10:52

## 2017-05-11 NOTE — PN
[facility] Progress Note


Note: 


abscess x 2 right inguinal, raised


recommend warm compress qid, avoid irritation to the area, keep area clean and 

dry


encourage hand washing


continue antibiotic

## 2017-05-12 ENCOUNTER — HOSPITAL ENCOUNTER (EMERGENCY)
Dept: HOSPITAL 74 - JER | Age: 57
Discharge: HOME | End: 2017-05-12
Payer: COMMERCIAL

## 2017-05-12 VITALS — SYSTOLIC BLOOD PRESSURE: 149 MMHG | DIASTOLIC BLOOD PRESSURE: 96 MMHG | HEART RATE: 76 BPM | TEMPERATURE: 97.8 F

## 2017-05-12 VITALS — BODY MASS INDEX: 36.6 KG/M2

## 2017-05-12 DIAGNOSIS — E78.00: ICD-10-CM

## 2017-05-12 DIAGNOSIS — F20.9: ICD-10-CM

## 2017-05-12 DIAGNOSIS — J45.909: ICD-10-CM

## 2017-05-12 DIAGNOSIS — F10.20: ICD-10-CM

## 2017-05-12 DIAGNOSIS — L02.214: Primary | ICD-10-CM

## 2017-05-12 DIAGNOSIS — F31.9: ICD-10-CM

## 2017-05-12 DIAGNOSIS — L73.8: ICD-10-CM

## 2017-05-12 DIAGNOSIS — Z79.84: ICD-10-CM

## 2017-05-12 DIAGNOSIS — E11.9: ICD-10-CM

## 2017-05-12 DIAGNOSIS — G40.909: ICD-10-CM

## 2017-05-12 DIAGNOSIS — I10: ICD-10-CM

## 2017-05-12 PROCEDURE — 0H99XZZ DRAINAGE OF PERINEUM SKIN, EXTERNAL APPROACH: ICD-10-PCS | Performed by: EMERGENCY MEDICINE

## 2017-05-12 RX ADMIN — DOXYCYCLINE HYCLATE SCH MG: 100 TABLET, COATED ORAL at 10:32

## 2017-05-12 RX ADMIN — DOCUSATE SODIUM SCH MG: 100 CAPSULE, LIQUID FILLED ORAL at 21:52

## 2017-05-12 RX ADMIN — Medication SCH TAB: at 10:32

## 2017-05-12 RX ADMIN — FLUTICASONE PROPIONATE SCH SPRAY: 50 SPRAY, METERED NASAL at 10:34

## 2017-05-12 RX ADMIN — LISINOPRIL SCH MG: 10 TABLET ORAL at 10:32

## 2017-05-12 RX ADMIN — TRAZODONE HYDROCHLORIDE SCH MG: 100 TABLET ORAL at 21:51

## 2017-05-12 RX ADMIN — INSULIN ASPART SCH: 100 INJECTION, SOLUTION INTRAVENOUS; SUBCUTANEOUS at 18:52

## 2017-05-12 RX ADMIN — ASPIRIN 81 MG SCH MG: 81 TABLET ORAL at 10:32

## 2017-05-12 RX ADMIN — METFORMIN HYDROCHLORIDE SCH MG: 500 TABLET ORAL at 19:39

## 2017-05-12 RX ADMIN — METFORMIN HYDROCHLORIDE SCH: 500 TABLET ORAL at 18:52

## 2017-05-12 RX ADMIN — FLUTICASONE PROPIONATE SCH SPRAY: 50 SPRAY, METERED NASAL at 21:53

## 2017-05-12 RX ADMIN — GABAPENTIN SCH MG: 300 CAPSULE ORAL at 21:51

## 2017-05-12 RX ADMIN — GABAPENTIN SCH MG: 300 CAPSULE ORAL at 06:17

## 2017-05-12 RX ADMIN — Medication SCH MG: at 21:52

## 2017-05-12 RX ADMIN — BACITRACIN ZINC SCH GM: 500 OINTMENT TOPICAL at 21:51

## 2017-05-12 RX ADMIN — METFORMIN HYDROCHLORIDE SCH MG: 500 TABLET ORAL at 06:17

## 2017-05-12 RX ADMIN — AMLODIPINE BESYLATE SCH MG: 10 TABLET ORAL at 10:32

## 2017-05-12 RX ADMIN — INSULIN ASPART SCH: 100 INJECTION, SOLUTION INTRAVENOUS; SUBCUTANEOUS at 07:18

## 2017-05-12 RX ADMIN — BUDESONIDE AND FORMOTEROL FUMARATE DIHYDRATE SCH PUFF: 160; 4.5 AEROSOL RESPIRATORY (INHALATION) at 21:52

## 2017-05-12 RX ADMIN — INSULIN ASPART SCH UNIT: 100 INJECTION, SOLUTION INTRAVENOUS; SUBCUTANEOUS at 19:36

## 2017-05-12 RX ADMIN — HYDROCORTISONE SCH APPLIC: 25 CREAM TOPICAL at 21:53

## 2017-05-12 RX ADMIN — BACITRACIN ZINC SCH GM: 500 OINTMENT TOPICAL at 10:32

## 2017-05-12 RX ADMIN — HYDROCORTISONE SCH APPLIC: 25 CREAM TOPICAL at 10:33

## 2017-05-12 RX ADMIN — BUDESONIDE AND FORMOTEROL FUMARATE DIHYDRATE SCH PUFF: 160; 4.5 AEROSOL RESPIRATORY (INHALATION) at 10:32

## 2017-05-12 RX ADMIN — CLINDAMYCIN HYDROCHLORIDE SCH MG: 150 CAPSULE ORAL at 23:17

## 2017-05-12 RX ADMIN — ACETAMINOPHEN PRN MG: 325 TABLET ORAL at 10:34

## 2017-05-12 RX ADMIN — NICOTINE SCH MG: 21 PATCH TRANSDERMAL at 10:34

## 2017-05-12 RX ADMIN — TRIAMCINOLONE ACETONIDE SCH APPLIC: 1 CREAM TOPICAL at 10:33

## 2017-05-12 RX ADMIN — GABAPENTIN SCH: 300 CAPSULE ORAL at 16:31

## 2017-05-12 RX ADMIN — ROSUVASTATIN CALCIUM SCH MG: 20 TABLET, FILM COATED ORAL at 21:51

## 2017-05-12 RX ADMIN — DOXYCYCLINE HYCLATE SCH: 100 TABLET, COATED ORAL at 18:52

## 2017-05-12 RX ADMIN — TRIAMCINOLONE ACETONIDE SCH APPLIC: 1 CREAM TOPICAL at 21:53

## 2017-05-12 RX ADMIN — ACETAMINOPHEN PRN MG: 325 TABLET ORAL at 06:17

## 2017-05-12 NOTE — PN
[facility] Progress Note


Note: 


abscess of right groin for 2 days,with tenderness,to er [facility] for evaluation

## 2017-05-12 NOTE — PN
[facility] Progress Note


Note: 


addendum spoke with dr mendoza at [facility],to be transported by [facility]

## 2017-05-12 NOTE — PDOC
History of Present Illness





- General


History Source: Patient


Exam Limitations: No Limitations





- History of Present Illness


Initial Comments: 


05/12/17 15:34


The patient is a 56 year old male, with a significant past medical history of 

obesity, asthma, HTN, HLD, DM, bipolar disorder, schizophrenia, and seizures, 

who presents to the emergency department sent by rehab facility for evaluation 

of right supra pubic abscess that has been growing for several days. As per 

records, the patient presented to the ED with a similar abscess localized to 

the right groin, right buttock, and left inner thigh approximately 3 weeks ago, 

during which it was drained. At the time, the patient reported he was given 

Bactrim by his rehab doctor, with minimal relief. Today, the patient reports 

pain to the right groin secondary to abscess. He reports his pain is 

exacerbated when walking. Patient reports a subjective fever, but denies any 

chills, cough, headache, or dizziness. The patient denies any dysuria, hematuria

, frequency, or urgency. The patient denies any abdominal pain, nausea, vomiting

, diarrhea, or constipation.





Allergies: None reported.


Past Surgical History: Appendectomy, Right prosthetic eye(congenital retinopathy

)


Social History: ETOH abuse. Current everyday smoker(3 cigarettes per day). 

Denies drug use. 








<Rolan Queen - Last Filed: 05/12/17 15:38>





- General


History Source: Patient





<Lizzie Pulido - Last Filed: 05/12/17 17:18>





- General


Chief Complaint: Abscess Boil


Stated Complaint: ABSCESS


Time Seen by Provider: 05/12/17 14:38





Past History





<Rolan Queen - Last Filed: 05/12/17 15:38>





- Past Medical History


Anemia: No


Asthma: Yes


Cancer: No


Cardiac Disorders: No


CVA: No


COPD: No


CHF: No


Dementia: No


Diabetes: Yes


GI Disorders: No


 Disorders: No


HTN: Yes


Hypercholesterolemia: Yes


Kidney Stones: No


Liver Disease: No


Suicide Attempt (Hx): No


Seizures: Yes


Thyroid Disease: No





- Surgical History


Abdominal Surgery: No


Appendectomy: Yes (at age 13)


Cardiac Surgery: No


Cholecystectomy: No


Lung Surgery: No


Neurologic Surgery: No


Orthopedic Surgery: Yes





- Reproductive History


Testicular Surgery: No





- Psycho/Social/Smoking Cessation Hx


Anxiety: No


Suicidal Ideation: No


Smoking History: Current every day smoker


Have you smoked in the past 12 months: Yes


Number of Cigarettes Smoked Daily: 3


Cigars Per Day: 0


Information on smoking cessation initiated: No


'Breaking Loose' booklet given: 04/11/17


Hx Alcohol Use: Yes


Drug/Substance Use Hx: No


Substance Use Type: Alcohol


Hx Substance Use Treatment: Yes





<Lizzie Pulido - Last Filed: 05/12/17 17:18>





- Past Medical History


Allergies/Adverse Reactions: 


 Allergies











Allergy/AdvReac Type Severity Reaction Status Date / Time


 


No Known Drug Allergies Allergy   Verified 04/17/17 13:23











Home Medications: 


Ambulatory Orders





Trazodone HCl 100 mg PO HS #30 tablet 05/04/16 


Quetiapine Fumarate [Seroquel] 200 tab PO BID #60 tablet 05/09/16 


Fluticasone Prop 0.05% Nasal [Flonase -] 1 spray NS BID #1 spray 05/31/16 


Folic Acid - 1 mg PO DAILY 04/11/17 


Triamcinolone Acet 0.1% Cream [Aristocort] 80 gm TP DAILY 04/11/17 


Gabapentin 300 mg PO TID #90 capsule 04/12/17 


Aspirin [ASA -] 81 mg PO DAILY #30 tab.chew 04/16/17 


Budesonide/Formeterol Fumarate [SYMBICORT 160/4.5mcg -] 1 inh PO BID #1 inhaler 

04/16/17 


Clindamycin [Cleocin -] 300 mg PO Q6HPO #40 capsule 04/16/17 


Docusate Sodium [Colace -] 100 mg PO TID #30 tab 04/16/17 


Hydrocortisone [Preparation H] 1 applic TP QID PRN #1 tube 04/16/17 


Ibuprofen 400 mg PO QID PRN #40 tablet 04/16/17 


Lisinopril [Prinivil] 10 mg PO DAILY #30 tab 04/16/17 


Rosuvastatin Calcium [Crestor] 20 mg PO HS #30 tab 04/16/17 


Metformin HCl [Glucophage -] 500 mg PO BID 04/17/17 


Albuterol Sulfate Inhaler - [Ventolin HFA Inhaler -] 2 puff IH Q4H PRN #1 

inhaler 05/01/17 


Amlodipine Besylate [Norvasc -] 10 mg PO DAILY #30 tablet 05/01/17 


Aspirin [ASA -] 81 mg PO DAILY #30 tab.chew 05/01/17 


Budesonide/Formeterol Fumarate [SYMBICORT 160/4.5mcg -] 2 puff IH BID #1 

inhaler 05/01/17 


Fluticasone Prop 0.05% Nasal [Flonase -] 1 spray NS BID #1 spray 05/01/17 


Hydrocortisone 2.5% Topical Cr [Anusol-Hc -] 1 applic TP BID #1 tube 05/01/17 


Lisinopril [Prinivil] 10 mg PO DAILY #30 tablet 05/01/17 


Metformin HCl [Glucophage -] 500 mg PO BIDAC #60 tablet 05/01/17 


Rosuvastatin [Crestor -] 20 mg PO HS #30 tablet 05/01/17 


Triamcinolone 0.1% Cream [Aristocort 0.1% Cream -] 1 applic TP BID #1 applic 05/ 01/17 


Clindamycin [Cleocin -] 300 mg PO TID #21 capsule 05/12/17 











**Review of Systems





- Review of Systems


Able to Perform ROS?: Yes


Comments:: 





05/12/17 15:36


GENERAL/CONSTITUTIONAL: Yes: +fever. No chills. No weakness.


HEAD, EYES, EARS, NOSE AND THROAT: No change in vision. No ear pain or 

discharge. No sore throat.


CARDIOVASCULAR: No chest pain or shortness of breath.


RESPIRATORY: No cough, wheezing, or hemoptysis.


GASTROINTESTINAL: No nausea, vomiting, diarrhea or constipation.


GENITOURINARY: Yes: +Right suprapubic abscess. No dysuria, frequency, or change 

in urination.


MUSCULOSKELETAL: No joint or muscle swelling or pain. No neck or back pain.


SKIN: No rash


NEUROLOGIC: No headache, vertigo, loss of consciousness, or change in strength/

sensation.


ENDOCRINE: No increased thirst. No abnormal weight change.


HEMATOLOGIC/LYMPHATIC: No anemia, easy bleeding, or history of blood clots.


ALLERGIC/IMMUNOLOGIC: No hives or skin allergy.





<Rolan Queen - Last Filed: 05/12/17 15:38>





*Physical Exam





- Vital Signs


 Last Vital Signs











Temp Pulse Resp BP Pulse Ox


 


 97.3 F L  87   20   157/87   93 L


 


 05/12/17 13:54  05/12/17 13:54  05/12/17 13:54  05/12/17 13:54  05/12/17 13:54














- Physical Exam


Comments: 


05/12/17 15:36


GENERAL: Awake, alert, and fully oriented, in no acute distress


HEAD: No signs of trauma


EYES: PERRLA, EOMI, sclera anicteric, conjunctiva clear


ENT: Auricles normal inspection, hearing grossly normal, nares patent. Moist 

mucosa


NECK: Normal ROM, supple, no lymphadenopathy, JVD, or masses


LUNGS: Breath sounds equal, clear to auscultation bilaterally.  No wheezes, and 

no crackles


HEART: Regular rate and rhythm, normal S1 and S2, no murmurs, rubs or gallops


ABDOMEN: Obese. Soft, nontender, normoactive bowel sounds.  No guarding, no 

rebound.  No masses


EXTREMITIES: Normal range of motion, no edema.  No clubbing or cyanosis. No 

cords, erythema, or tenderness. DP/PT pulses 2+ and symmetric. 


GENITOURINARY: Right suprapubic region with small folliculitis. Palpable 

indurated area measuring 0.5 x 1 cm, with small fluctuance, but no surrounding 

erythema or warmth. No scrotal edema or crepitus. Testicles are nontender.


NEUROLOGICAL: Moves all extremities. Normal speech, normal gait


SKIN: Warm, Dry, normal turgor.








<Rolan Queen - Last Filed: 05/12/17 15:38>





- Vital Signs


 Last Vital Signs











Temp Pulse Resp BP Pulse Ox


 


 97.3 F L  87   20   157/87   93 L


 


 05/12/17 13:54  05/12/17 13:54  05/12/17 13:54  05/12/17 13:54  05/12/17 13:54














<Lizzie Pulido - Last Filed: 05/12/17 17:18>





Procedures





- Incision and Drainage


I&D Site: Right: Groin


Betadine cleansed: Yes


Anesthesia: 1% Lidocaine


Volume(ml): 1 (serosanginous fluid)


Blade Size: 11


Plain Packing: No


Complications: none


Dressing: Yes





<Lizzie Pulido - Last Filed: 05/12/17 17:18>





Medical Decision Making





- Medical Decision Making


05/12/17 15:24


57 yo male DM HTN here wtih groin abscess. pt is in rehab facility, states has 

been growing for few days where he is for etoh withdrawal , detox.  pt  

has had abscess recently which was drained at [facility]. no f/c no n/v. no 

other complaints. 


on exam lungs CTAB, heart RRR no m/r/g. abd soft obese nontender. right 

suprapubic region with small folliculitis, small indurated area measuring 0.5 x 

1 cm. small fluctuance. no scrotal erythema no crepitus. no erythema. 





MDM folliculitis vs. abcess. plan bedside soft tissue us r/o fluid collection, 

abx bactrim DS, outpt surgery followup. recommend hygein, warm soaks and abx.





05/12/17 17:14


I &D. small serosanginous fluid, tolerated well. dressing placed. dc on 

clindamycin





<Lizzie Pulido - Last Filed: 05/12/17 17:18>





*DC/Admit/Observation/Transfer





- Attestations


Scribe Attestion: 





05/12/17 15:37





Documentation prepared by Rolan Queen, acting as medical scribe for Lizzie Pulido MD.





<Rolan Queen - Last Filed: 05/12/17 15:38>





- Discharge Dispostion


Admit: No





<Lizzie Pulido - Last Filed: 05/12/17 17:18>


Diagnosis at time of Disposition: 


 Folliculitis, Abscess





- Discharge Dispostion


Disposition: HOME


Condition at time of disposition: Good





- Prescriptions


Prescriptions: 


Clindamycin [Cleocin -] 300 mg PO TID #21 capsule





- Patient Instructions


Printed Discharge Instructions:  DI for Incision and Drainage of a Skin Abscess


Additional Instructions: 


take clindamycin 300 mg three times daily x 7 days. you need to keep good hygein

, shower twice daily. return for fevers, worsening redness or any concerns

## 2017-05-13 RX ADMIN — HYDROCORTISONE SCH APPLIC: 25 CREAM TOPICAL at 10:40

## 2017-05-13 RX ADMIN — ALUMINUM HYDROXIDE, MAGNESIUM HYDROXIDE, AND SIMETHICONE PRN ML: 200; 200; 20 SUSPENSION ORAL at 14:17

## 2017-05-13 RX ADMIN — BACITRACIN ZINC SCH GM: 500 OINTMENT TOPICAL at 10:40

## 2017-05-13 RX ADMIN — ROSUVASTATIN CALCIUM SCH MG: 20 TABLET, FILM COATED ORAL at 21:41

## 2017-05-13 RX ADMIN — BUDESONIDE AND FORMOTEROL FUMARATE DIHYDRATE SCH PUFF: 160; 4.5 AEROSOL RESPIRATORY (INHALATION) at 10:40

## 2017-05-13 RX ADMIN — ASPIRIN 81 MG SCH MG: 81 TABLET ORAL at 10:40

## 2017-05-13 RX ADMIN — IBUPROFEN PRN MG: 400 TABLET, FILM COATED ORAL at 10:43

## 2017-05-13 RX ADMIN — ACETAMINOPHEN PRN MG: 325 TABLET ORAL at 21:44

## 2017-05-13 RX ADMIN — GABAPENTIN SCH MG: 300 CAPSULE ORAL at 21:41

## 2017-05-13 RX ADMIN — Medication SCH MG: at 21:42

## 2017-05-13 RX ADMIN — HYDROCORTISONE SCH APPLIC: 25 CREAM TOPICAL at 21:40

## 2017-05-13 RX ADMIN — AMLODIPINE BESYLATE SCH MG: 10 TABLET ORAL at 10:40

## 2017-05-13 RX ADMIN — DOCUSATE SODIUM SCH MG: 100 CAPSULE, LIQUID FILLED ORAL at 21:41

## 2017-05-13 RX ADMIN — BACITRACIN ZINC SCH GM: 500 OINTMENT TOPICAL at 21:42

## 2017-05-13 RX ADMIN — CLINDAMYCIN HYDROCHLORIDE SCH MG: 150 CAPSULE ORAL at 21:41

## 2017-05-13 RX ADMIN — BUDESONIDE AND FORMOTEROL FUMARATE DIHYDRATE SCH PUFF: 160; 4.5 AEROSOL RESPIRATORY (INHALATION) at 21:40

## 2017-05-13 RX ADMIN — FLUTICASONE PROPIONATE SCH SPRAY: 50 SPRAY, METERED NASAL at 21:40

## 2017-05-13 RX ADMIN — DOXYCYCLINE HYCLATE SCH MG: 100 TABLET, COATED ORAL at 17:19

## 2017-05-13 RX ADMIN — IBUPROFEN PRN MG: 400 TABLET, FILM COATED ORAL at 16:41

## 2017-05-13 RX ADMIN — FLUTICASONE PROPIONATE SCH SPRAY: 50 SPRAY, METERED NASAL at 10:40

## 2017-05-13 RX ADMIN — Medication SCH TAB: at 10:39

## 2017-05-13 RX ADMIN — INSULIN ASPART SCH: 100 INJECTION, SOLUTION INTRAVENOUS; SUBCUTANEOUS at 16:41

## 2017-05-13 RX ADMIN — INSULIN ASPART SCH: 100 INJECTION, SOLUTION INTRAVENOUS; SUBCUTANEOUS at 06:36

## 2017-05-13 RX ADMIN — GABAPENTIN SCH MG: 300 CAPSULE ORAL at 14:13

## 2017-05-13 RX ADMIN — METFORMIN HYDROCHLORIDE SCH MG: 500 TABLET ORAL at 06:35

## 2017-05-13 RX ADMIN — DOXYCYCLINE HYCLATE SCH MG: 100 TABLET, COATED ORAL at 10:40

## 2017-05-13 RX ADMIN — CLINDAMYCIN HYDROCHLORIDE SCH MG: 150 CAPSULE ORAL at 14:13

## 2017-05-13 RX ADMIN — NICOTINE SCH MG: 21 PATCH TRANSDERMAL at 10:45

## 2017-05-13 RX ADMIN — TRIAMCINOLONE ACETONIDE SCH APPLIC: 1 CREAM TOPICAL at 21:41

## 2017-05-13 RX ADMIN — CLINDAMYCIN HYDROCHLORIDE SCH MG: 150 CAPSULE ORAL at 06:35

## 2017-05-13 RX ADMIN — METFORMIN HYDROCHLORIDE SCH MG: 500 TABLET ORAL at 16:40

## 2017-05-13 RX ADMIN — TRIAMCINOLONE ACETONIDE SCH APPLIC: 1 CREAM TOPICAL at 11:16

## 2017-05-13 RX ADMIN — TRAZODONE HYDROCHLORIDE SCH MG: 100 TABLET ORAL at 21:41

## 2017-05-13 RX ADMIN — ALUMINUM HYDROXIDE, MAGNESIUM HYDROXIDE, AND SIMETHICONE PRN ML: 200; 200; 20 SUSPENSION ORAL at 02:05

## 2017-05-13 RX ADMIN — LISINOPRIL SCH MG: 10 TABLET ORAL at 10:40

## 2017-05-13 RX ADMIN — GABAPENTIN SCH MG: 300 CAPSULE ORAL at 06:35

## 2017-05-14 RX ADMIN — ALUMINUM HYDROXIDE, MAGNESIUM HYDROXIDE, AND SIMETHICONE PRN ML: 200; 200; 20 SUSPENSION ORAL at 14:23

## 2017-05-14 RX ADMIN — METFORMIN HYDROCHLORIDE SCH MG: 500 TABLET ORAL at 06:30

## 2017-05-14 RX ADMIN — INSULIN ASPART SCH: 100 INJECTION, SOLUTION INTRAVENOUS; SUBCUTANEOUS at 17:08

## 2017-05-14 RX ADMIN — FLUTICASONE PROPIONATE SCH SPRAY: 50 SPRAY, METERED NASAL at 21:40

## 2017-05-14 RX ADMIN — GABAPENTIN SCH MG: 300 CAPSULE ORAL at 21:42

## 2017-05-14 RX ADMIN — AMLODIPINE BESYLATE SCH MG: 10 TABLET ORAL at 10:36

## 2017-05-14 RX ADMIN — BUDESONIDE AND FORMOTEROL FUMARATE DIHYDRATE SCH PUFF: 160; 4.5 AEROSOL RESPIRATORY (INHALATION) at 10:36

## 2017-05-14 RX ADMIN — Medication SCH MG: at 21:41

## 2017-05-14 RX ADMIN — TRIAMCINOLONE ACETONIDE SCH APPLIC: 1 CREAM TOPICAL at 21:41

## 2017-05-14 RX ADMIN — CLINDAMYCIN HYDROCHLORIDE SCH MG: 150 CAPSULE ORAL at 14:07

## 2017-05-14 RX ADMIN — ACETAMINOPHEN PRN MG: 325 TABLET ORAL at 10:38

## 2017-05-14 RX ADMIN — Medication SCH TAB: at 10:36

## 2017-05-14 RX ADMIN — HYDROCORTISONE SCH APPLIC: 25 CREAM TOPICAL at 21:40

## 2017-05-14 RX ADMIN — ROSUVASTATIN CALCIUM SCH MG: 20 TABLET, FILM COATED ORAL at 21:41

## 2017-05-14 RX ADMIN — TRAZODONE HYDROCHLORIDE SCH MG: 100 TABLET ORAL at 21:42

## 2017-05-14 RX ADMIN — INSULIN ASPART SCH: 100 INJECTION, SOLUTION INTRAVENOUS; SUBCUTANEOUS at 06:37

## 2017-05-14 RX ADMIN — GABAPENTIN SCH MG: 300 CAPSULE ORAL at 06:31

## 2017-05-14 RX ADMIN — BUDESONIDE AND FORMOTEROL FUMARATE DIHYDRATE SCH PUFF: 160; 4.5 AEROSOL RESPIRATORY (INHALATION) at 21:40

## 2017-05-14 RX ADMIN — TRIAMCINOLONE ACETONIDE SCH APPLIC: 1 CREAM TOPICAL at 10:37

## 2017-05-14 RX ADMIN — ACETAMINOPHEN PRN MG: 325 TABLET ORAL at 17:08

## 2017-05-14 RX ADMIN — GABAPENTIN SCH MG: 300 CAPSULE ORAL at 14:07

## 2017-05-14 RX ADMIN — BACITRACIN ZINC SCH GM: 500 OINTMENT TOPICAL at 10:36

## 2017-05-14 RX ADMIN — BACITRACIN ZINC SCH GM: 500 OINTMENT TOPICAL at 21:42

## 2017-05-14 RX ADMIN — LISINOPRIL SCH MG: 10 TABLET ORAL at 10:36

## 2017-05-14 RX ADMIN — METFORMIN HYDROCHLORIDE SCH MG: 500 TABLET ORAL at 17:07

## 2017-05-14 RX ADMIN — ACETAMINOPHEN PRN MG: 325 TABLET ORAL at 06:32

## 2017-05-14 RX ADMIN — FLUTICASONE PROPIONATE SCH SPRAY: 50 SPRAY, METERED NASAL at 10:35

## 2017-05-14 RX ADMIN — CLINDAMYCIN HYDROCHLORIDE SCH MG: 150 CAPSULE ORAL at 21:42

## 2017-05-14 RX ADMIN — DOXYCYCLINE HYCLATE SCH MG: 100 TABLET, COATED ORAL at 10:36

## 2017-05-14 RX ADMIN — DOCUSATE SODIUM SCH MG: 100 CAPSULE, LIQUID FILLED ORAL at 21:42

## 2017-05-14 RX ADMIN — ASPIRIN 81 MG SCH MG: 81 TABLET ORAL at 10:36

## 2017-05-14 RX ADMIN — NICOTINE SCH MG: 21 PATCH TRANSDERMAL at 10:37

## 2017-05-14 RX ADMIN — HYDROCORTISONE SCH APPLIC: 25 CREAM TOPICAL at 10:35

## 2017-05-14 RX ADMIN — CLINDAMYCIN HYDROCHLORIDE SCH MG: 150 CAPSULE ORAL at 06:31

## 2017-05-14 RX ADMIN — DOXYCYCLINE HYCLATE SCH MG: 100 TABLET, COATED ORAL at 17:07

## 2017-05-15 VITALS — TEMPERATURE: 97.2 F | SYSTOLIC BLOOD PRESSURE: 147 MMHG | HEART RATE: 102 BPM | DIASTOLIC BLOOD PRESSURE: 87 MMHG

## 2017-05-15 PROCEDURE — HZ42ZZZ GROUP COUNSELING FOR SUBSTANCE ABUSE TREATMENT, COGNITIVE-BEHAVIORAL: ICD-10-PCS | Performed by: PSYCHIATRY & NEUROLOGY

## 2017-05-15 RX ADMIN — AMLODIPINE BESYLATE SCH MG: 10 TABLET ORAL at 10:19

## 2017-05-15 RX ADMIN — BACITRACIN ZINC SCH GM: 500 OINTMENT TOPICAL at 10:25

## 2017-05-15 RX ADMIN — CLINDAMYCIN HYDROCHLORIDE SCH MG: 150 CAPSULE ORAL at 05:58

## 2017-05-15 RX ADMIN — INSULIN ASPART SCH: 100 INJECTION, SOLUTION INTRAVENOUS; SUBCUTANEOUS at 06:00

## 2017-05-15 RX ADMIN — ASPIRIN 81 MG SCH MG: 81 TABLET ORAL at 10:19

## 2017-05-15 RX ADMIN — LISINOPRIL SCH MG: 10 TABLET ORAL at 10:19

## 2017-05-15 RX ADMIN — GABAPENTIN SCH MG: 300 CAPSULE ORAL at 05:58

## 2017-05-15 RX ADMIN — TRIAMCINOLONE ACETONIDE SCH APPLIC: 1 CREAM TOPICAL at 10:19

## 2017-05-15 RX ADMIN — HYDROCORTISONE SCH APPLIC: 25 CREAM TOPICAL at 10:18

## 2017-05-15 RX ADMIN — FLUTICASONE PROPIONATE SCH SPRAY: 50 SPRAY, METERED NASAL at 10:18

## 2017-05-15 RX ADMIN — METFORMIN HYDROCHLORIDE SCH MG: 500 TABLET ORAL at 06:00

## 2017-05-15 RX ADMIN — ACETAMINOPHEN PRN MG: 325 TABLET ORAL at 05:58

## 2017-05-15 RX ADMIN — Medication SCH TAB: at 10:19

## 2017-05-15 RX ADMIN — DOXYCYCLINE HYCLATE SCH MG: 100 TABLET, COATED ORAL at 10:19

## 2017-05-15 RX ADMIN — BUDESONIDE AND FORMOTEROL FUMARATE DIHYDRATE SCH PUFF: 160; 4.5 AEROSOL RESPIRATORY (INHALATION) at 10:17

## 2017-05-15 RX ADMIN — NICOTINE SCH MG: 21 PATCH TRANSDERMAL at 10:21

## 2017-05-15 NOTE — PN
Psychiatric Progress Note


Vital Signs: 


 Vital Signs











 Period  Temp  Pulse  Resp  BP Sys/Yeh  Pulse Ox


 


 Last 24 Hr  97.2 F  102-103  16-18  142-147/82-87  











Date of Session: 05/15/17


Chief Complaint:: discharge visit


HPI: Patient has addressed alcohol, nicotine dependence comorbid 

schizoaffective disorder.


ROS: WNL


Current Medications: 


Active Medications











Generic Name Dose Route Start Last Admin





  Trade Name Freq  PRN Reason Stop Dose Admin


 


Acetaminophen  650 mg 04/17/17 13:48 05/15/17 05:58





  Tylenol -  PO   650 mg





  Q4H PRN   Administration





  FEVER OR PAIN   


 


Al Hydroxide/Mg Hydroxide  30 ml 04/17/17 13:48 05/14/17 14:23





  Mylanta Oral Suspension -  PO   30 ml





  Q6H PRN   Administration





  DYSPEPSIA   


 


Albuterol Sulfate  2 puff 04/21/17 15:51 05/08/17 11:55





  Ventolin Hfa Inhaler -  IH   2 puff





  Q4H PRN   Administration





  SHORT OF BREATH/WHEEZING   


 


Amlodipine Besylate  10 mg 04/18/17 10:00 05/14/17 10:36





  Norvasc -  PO   10 mg





  DAILY ADALI   Administration


 


Aspirin  81 mg 04/18/17 10:00 05/14/17 10:36





  Asa -  PO   81 mg





  DAILY ADALI   Administration


 


Bacitracin  0.9 gm 04/19/17 22:00 05/14/17 21:42





  Bacitracin -  TP   0.9 gm





  BID ADALI   Administration


 


Budesonide/Formoterol Fumarate  2 puff 04/17/17 22:00 05/14/17 21:40





  Symbicort 160/4.5mcg -  IH   2 puff





  BID ADALI   Administration


 


Clindamycin HCl  300 mg 05/12/17 23:15 05/15/17 05:58





  Cleocin -  PO   300 mg





  TID ADALI   Administration


 


Diphenhydramine HCl  50 mg 04/17/17 13:48 05/14/17 21:41





  Benadryl -  PO   50 mg





  HSMR1 PRN   Administration





  FOR ITCHING   


 


Docusate Sodium  300 mg 05/02/17 22:00 05/14/17 21:42





  Colace -  PO   300 mg





  HS ADALI   Administration


 


Doxycycline Hyclate  100 mg 05/09/17 18:00 05/14/17 17:07





  Vibratab -  PO 05/16/17 17:59  100 mg





  BID@1000,1800 ADALI   Administration


 


Eucalyptus/Menthol/Phenol/Sorbitol  1 each 04/17/17 13:48 05/02/17 14:29





  Cepastat Lozenge -  MM   1 each





  Q4H PRN   Administration





  SORE THROAT   


 


Fluticasone Propionate  1 spray 04/17/17 22:00 05/14/17 21:40





  Flonase -  NS   1 spray





  BID ADALI   Administration


 


Gabapentin  300 mg 04/17/17 15:45 05/15/17 05:58





  Neurontin -  PO   300 mg





  TID ADALI   Administration


 


Guaifenesin  10 ml 04/17/17 13:48 05/04/17 22:00





  Robitussin Dm -  PO   10 ml





  Q6H PRN   Administration





  COUGH   


 


Hydrocortisone  1 applic 04/18/17 22:00 05/14/17 21:40





  Anusol 2.5% Hc Cream -  TP   1 applic





  BID ADALI   Administration


 


Hydroxyzine Pamoate  50 mg 04/17/17 13:48  





  Vistaril -  PO   





  Q4H PRN   





  AGITATION   


 


Ibuprofen  400 mg 04/17/17 13:48 05/13/17 16:41





  Motrin -  PO   400 mg





  Q6H PRN   Administration





  PAIN   


 


Insulin Aspart  1 vial 04/21/17 16:30 05/15/17 06:00





  Novolog Vial Sliding Scale -  SQ   Not Given





  BIDAC [facility]   





  Protocol   


 


Lisinopril  10 mg 04/18/17 10:00 05/14/17 10:36





  Prinivil  PO   10 mg





  DAILY ADALI   Administration


 


Loperamide HCl  4 mg 04/17/17 13:48  





  Imodium -  PO   





  Q6H PRN   





  DIARRHEA   


 


Magnesium Hydroxide  30 ml 04/17/17 13:48 04/18/17 23:09





  Milk Of Magnesia -  PO   30 ml





  DAILY PRN   Administration





  CONSTIPATION   


 


Metformin HCl  500 mg 04/17/17 16:30 05/15/17 06:00





  Glucophage -  PO   500 mg





  BIDAC ADALI   Administration


 


Nicotine  21 mg 04/17/17 15:45 05/14/17 10:37





  Nicoderm Patch -  TD   21 mg





  DAILY ADALI   Administration


 


Prenatal Multivit/Folic Acid/Iron  1 tab 04/18/17 10:00 05/14/17 10:36





  Prenatal Vitamins (Sjr) -  PO   1 tab





  DAILY ADALI   Administration


 


Pseudoephedrine/Triprolidine  1 combo 04/17/17 13:48  





  Actifed -  PO   





  TID PRN   





  NASAL CONGESTION   


 


Quetiapine Fumarate  200 mg 04/27/17 22:00 05/14/17 21:42





  Seroquel -  PO   200 mg





  BID ADALI   Administration


 


Rosuvastatin Calcium  20 mg 04/17/17 22:00 05/14/17 21:41





  Crestor -  PO   20 mg





  HS ADALI   Administration


 


Thiamine HCl  100 mg 04/17/17 22:00 05/14/17 21:41





  Vitamin B1 -  PO   100 mg





  HS ADALI   Administration


 


Trazodone HCl  100 mg 04/17/17 22:00 05/14/17 21:42





  Desyrel -  PO   100 mg





  HS ADALI   Administration


 


Triamcinolone Acetonide  1 applic 04/24/17 22:00 05/14/17 21:41





  Aristocort 0.1% Cream -  TP   1 applic





  BID ADALI   Administration











Current Side Effect: No


Lab tests ordered: No


Lab tests reviewed: Yes


Provider note:: Patient has compelted today his treatment an dmet his goals 

will continue to adadress his issues at [facility] outpatient  Rehabilitation treatment program. Patient focused on insight he 

gained in this treatment and ways to cope with stressors, utilization of 

support system has been discussed. Patient is stable for discharge.


Total face to face time:: 15





Mental Status Exam





- Mental Status Exam


Alert and Oriented to: Time, Place, Person


Cognitive Function: Good


Patient Appearance: Well Groomed


Mood: Hopeful


Affect: Appropriate, Mood Congruent


Patient Behavior: Appropriate, Cooperative


Speech Pattern: Clear, Appropriate


Voice Loudness: Normal


Thought Process: Goal Oriented


Thought Disorder: Not Present


Hallucinations: Denies


Suicidal Ideation: Denies


Homicidal Ideation: Denies


Insight/Judgement: Fair


Sleep: Fair


Appetite: Fair


Muscle strength/Tone: Normal


Gait/Station: Normal





Psychiatric Treatment Plan





- Problem List








(2) Nicotine dependence


Qualifiers: 


     Nicotine product type: cigarettes     Substance use status: in withdrawal 

       Qualified Code(s): F17.213 - Nicotine dependence, cigarettes, with 

withdrawal  





(3) Schizoaffective disorder


Qualifiers: 


     Schizoaffective disorder type: depressive        Qualified Code(s): F25.1 

- Schizoaffective disorder, depressive type

## 2017-06-14 ENCOUNTER — EMERGENCY (EMERGENCY)
Facility: HOSPITAL | Age: 57
LOS: 1 days | Discharge: HOME | End: 2017-06-14
Attending: EMERGENCY MEDICINE | Admitting: EMERGENCY MEDICINE
Payer: COMMERCIAL

## 2017-06-14 VITALS
RESPIRATION RATE: 18 BRPM | OXYGEN SATURATION: 97 % | TEMPERATURE: 98 F | HEART RATE: 100 BPM | SYSTOLIC BLOOD PRESSURE: 130 MMHG | DIASTOLIC BLOOD PRESSURE: 72 MMHG

## 2017-06-14 VITALS
OXYGEN SATURATION: 98 % | RESPIRATION RATE: 16 BRPM | DIASTOLIC BLOOD PRESSURE: 71 MMHG | SYSTOLIC BLOOD PRESSURE: 133 MMHG | HEART RATE: 87 BPM

## 2017-06-14 DIAGNOSIS — Z98.890 OTHER SPECIFIED POSTPROCEDURAL STATES: Chronic | ICD-10-CM

## 2017-06-14 DIAGNOSIS — I10 ESSENTIAL (PRIMARY) HYPERTENSION: ICD-10-CM

## 2017-06-14 DIAGNOSIS — E11.9 TYPE 2 DIABETES MELLITUS WITHOUT COMPLICATIONS: ICD-10-CM

## 2017-06-14 DIAGNOSIS — F10.129 ALCOHOL ABUSE WITH INTOXICATION, UNSPECIFIED: ICD-10-CM

## 2017-06-14 DIAGNOSIS — Z79.1 LONG TERM (CURRENT) USE OF NON-STEROIDAL ANTI-INFLAMMATORIES (NSAID): ICD-10-CM

## 2017-06-14 DIAGNOSIS — R41.82 ALTERED MENTAL STATUS, UNSPECIFIED: ICD-10-CM

## 2017-06-14 DIAGNOSIS — Z79.2 LONG TERM (CURRENT) USE OF ANTIBIOTICS: ICD-10-CM

## 2017-06-14 DIAGNOSIS — Z79.899 OTHER LONG TERM (CURRENT) DRUG THERAPY: ICD-10-CM

## 2017-06-14 DIAGNOSIS — Z79.84 LONG TERM (CURRENT) USE OF ORAL HYPOGLYCEMIC DRUGS: ICD-10-CM

## 2017-06-14 PROCEDURE — 99283 EMERGENCY DEPT VISIT LOW MDM: CPT | Mod: 25

## 2017-06-14 PROCEDURE — 99285 EMERGENCY DEPT VISIT HI MDM: CPT

## 2017-06-14 NOTE — ED PROVIDER NOTE - NS ED ACP STMENT
“I have personally evaluated and examined the patient. The Attending was available to me as a supervising provider if needed.”

## 2017-06-14 NOTE — ED PROVIDER NOTE - PHYSICAL EXAMINATION
bilateral crusting of d/c at eyes easily clear and patient expresses thanks + unkempt and malodorous but no overt emergent findings sans intoxication appreciated no bony tenderness or skin deformity noted

## 2017-06-14 NOTE — ED ADULT TRIAGE NOTE - CHIEF COMPLAINT QUOTE
BIBA  pt found sleeping inside [place] . +AOB. No obvious head trauma/ injury. No vomiting. Pt ambulates with slow steady gait.

## 2017-06-14 NOTE — ED ADULT NURSE NOTE - CHIEF COMPLAINT QUOTE
BIBA  pt found sleeping inside [place] . +AOB. No obvious head trauma/ injury. No vomiting. Pt ambulates with slow steady gait.

## 2017-06-14 NOTE — ED PROVIDER NOTE - PROGRESS NOTE DETAILS
Pt received from night team - here w etoh, await waking; plan dc when awake and ambulatory w steady gait.  Pt sleeping, nad. Seen at bedside, sleeping, NAD.  Opens eyes to voice and groans, falls back asleep. Pt awake, ambulatory w steady gait. pt walking around ER asking around for a new shirt.

## 2017-06-14 NOTE — ED PROVIDER NOTE - SHIFT CHANGE DETAILS
56 m found sleeping in [employer].  EtOH-like odor on breath.  No signs of trauma.  FS glucose 156.  Monitoring for sobriety.  Pt sleeping comfortably right now.

## 2017-06-14 NOTE — ED PROVIDER NOTE - OBJECTIVE STATEMENT
found asleep and intoxicated in [employer] and thus BIBA to ED To ED secondary to public intoxication with history limited secondary to patient condition.

## 2017-06-14 NOTE — ED ADULT NURSE NOTE - OBJECTIVE STATEMENT
55 y/o male with hx of HTN and ETOH abuse was BIBA for altered mental status related to ETOH intoxication. Pt was found sleeping at [employer] restaurant with alcohol on his breath. Pt verbalized that he consumed alcohol a few hours before. Upon assessment, pt sleeping in stretcher, abdomen soft, lung fields WNL, breathing is equal and unlabored, pulses palpable. Pt denies any pain or discomfort. Care in progress. Awaiting disposition

## 2017-06-14 NOTE — ED PROVIDER NOTE - ATTENDING CONTRIBUTION TO CARE
Correction to attending statement: I have reviewed the ACP note, history, and exam, and agree with the plan of care. No signs of trauma. FS wnl. Observe for sobriety. D/c when clinically sober / alert

## 2017-06-28 ENCOUNTER — INPATIENT (INPATIENT)
Facility: HOSPITAL | Age: 57
LOS: 0 days | Discharge: HOME | DRG: 208 | End: 2017-06-29
Attending: INTERNAL MEDICINE | Admitting: INTERNAL MEDICINE
Payer: COMMERCIAL

## 2017-06-28 VITALS
RESPIRATION RATE: 20 BRPM | DIASTOLIC BLOOD PRESSURE: 71 MMHG | HEART RATE: 104 BPM | SYSTOLIC BLOOD PRESSURE: 147 MMHG | OXYGEN SATURATION: 95 %

## 2017-06-28 DIAGNOSIS — J96.92 RESPIRATORY FAILURE, UNSPECIFIED WITH HYPERCAPNIA: ICD-10-CM

## 2017-06-28 DIAGNOSIS — I10 ESSENTIAL (PRIMARY) HYPERTENSION: ICD-10-CM

## 2017-06-28 DIAGNOSIS — R63.8 OTHER SYMPTOMS AND SIGNS CONCERNING FOOD AND FLUID INTAKE: ICD-10-CM

## 2017-06-28 DIAGNOSIS — Z98.890 OTHER SPECIFIED POSTPROCEDURAL STATES: Chronic | ICD-10-CM

## 2017-06-28 DIAGNOSIS — R60.0 LOCALIZED EDEMA: ICD-10-CM

## 2017-06-28 DIAGNOSIS — F10.230 ALCOHOL DEPENDENCE WITH WITHDRAWAL, UNCOMPLICATED: ICD-10-CM

## 2017-06-28 DIAGNOSIS — E11.9 TYPE 2 DIABETES MELLITUS WITHOUT COMPLICATIONS: ICD-10-CM

## 2017-06-28 DIAGNOSIS — Z29.9 ENCOUNTER FOR PROPHYLACTIC MEASURES, UNSPECIFIED: ICD-10-CM

## 2017-06-28 LAB
ALBUMIN SERPL ELPH-MCNC: 3.5 G/DL — SIGNIFICANT CHANGE UP (ref 3.4–5)
ALP SERPL-CCNC: 87 U/L — SIGNIFICANT CHANGE UP (ref 40–120)
ALT FLD-CCNC: 47 U/L — HIGH (ref 12–42)
ANION GAP SERPL CALC-SCNC: 19 MMOL/L — HIGH (ref 5–17)
ANION GAP SERPL CALC-SCNC: 6 MMOL/L — LOW (ref 9–16)
APPEARANCE UR: (no result)
APTT BLD: 33.8 SEC — SIGNIFICANT CHANGE UP (ref 27.5–36.5)
AST SERPL-CCNC: 56 U/L — HIGH (ref 15–37)
BASOPHILS NFR BLD AUTO: 0.9 % — SIGNIFICANT CHANGE UP (ref 0–2)
BILIRUB SERPL-MCNC: 0.3 MG/DL — SIGNIFICANT CHANGE UP (ref 0.2–1.2)
BILIRUB UR-MCNC: NEGATIVE — SIGNIFICANT CHANGE UP
BUN SERPL-MCNC: 6 MG/DL — LOW (ref 7–23)
BUN SERPL-MCNC: 8 MG/DL — SIGNIFICANT CHANGE UP (ref 7–23)
CALCIUM SERPL-MCNC: 8.6 MG/DL — SIGNIFICANT CHANGE UP (ref 8.4–10.5)
CALCIUM SERPL-MCNC: 9 MG/DL — SIGNIFICANT CHANGE UP (ref 8.5–10.5)
CHLORIDE SERPL-SCNC: 105 MMOL/L — SIGNIFICANT CHANGE UP (ref 96–108)
CHLORIDE SERPL-SCNC: 98 MMOL/L — SIGNIFICANT CHANGE UP (ref 96–108)
CK SERPL-CCNC: 549 U/L — HIGH (ref 39–308)
CK SERPL-CCNC: 604 U/L — HIGH (ref 39–308)
CO2 SERPL-SCNC: 22 MMOL/L — SIGNIFICANT CHANGE UP (ref 22–31)
CO2 SERPL-SCNC: 34 MMOL/L — HIGH (ref 22–31)
COLOR SPEC: YELLOW — SIGNIFICANT CHANGE UP
CREAT SERPL-MCNC: 0.7 MG/DL — SIGNIFICANT CHANGE UP (ref 0.5–1.3)
CREAT SERPL-MCNC: 0.99 MG/DL — SIGNIFICANT CHANGE UP (ref 0.5–1.3)
D DIMER BLD IA.RAPID-MCNC: <150 NG/ML DDU — SIGNIFICANT CHANGE UP
DIFF PNL FLD: (no result)
EOSINOPHIL NFR BLD AUTO: 2 % — SIGNIFICANT CHANGE UP (ref 0–6)
ETHANOL SERPL-MCNC: 246 MG/DL — HIGH
GLUCOSE SERPL-MCNC: 175 MG/DL — HIGH (ref 70–99)
GLUCOSE SERPL-MCNC: 207 MG/DL — HIGH (ref 70–99)
GLUCOSE UR QL: NEGATIVE — SIGNIFICANT CHANGE UP
HCT VFR BLD CALC: 37.7 % — LOW (ref 39–50)
HCT VFR BLD CALC: 38.7 % — LOW (ref 39–50)
HGB BLD-MCNC: 12.8 G/DL — LOW (ref 13–17)
HGB BLD-MCNC: 12.9 G/DL — LOW (ref 13–17)
IMM GRANULOCYTES NFR BLD AUTO: 0.2 % — SIGNIFICANT CHANGE UP (ref 0–1.5)
INR BLD: 1.04 — SIGNIFICANT CHANGE UP (ref 0.88–1.16)
KETONES UR-MCNC: NEGATIVE — SIGNIFICANT CHANGE UP
LACTATE SERPL-SCNC: 2.2 MMOL/L — HIGH (ref 0.4–2)
LACTATE SERPL-SCNC: 2.5 MMOL/L — HIGH (ref 0.4–2)
LEUKOCYTE ESTERASE UR-ACNC: (no result)
LIDOCAIN IGE QN: 19 U/L — SIGNIFICANT CHANGE UP (ref 7–60)
LYMPHOCYTES # BLD AUTO: 51 % — HIGH (ref 13–44)
MAGNESIUM SERPL-MCNC: 1.6 MG/DL — SIGNIFICANT CHANGE UP (ref 1.6–2.6)
MCHC RBC-ENTMCNC: 31.2 PG — SIGNIFICANT CHANGE UP (ref 27–34)
MCHC RBC-ENTMCNC: 31.4 PG — SIGNIFICANT CHANGE UP (ref 27–34)
MCHC RBC-ENTMCNC: 33.3 G/DL — SIGNIFICANT CHANGE UP (ref 32–36)
MCHC RBC-ENTMCNC: 34 G/DL — SIGNIFICANT CHANGE UP (ref 32–36)
MCV RBC AUTO: 92.4 FL — SIGNIFICANT CHANGE UP (ref 80–100)
MCV RBC AUTO: 93.5 FL — SIGNIFICANT CHANGE UP (ref 80–100)
MONOCYTES NFR BLD AUTO: 9.8 % — SIGNIFICANT CHANGE UP (ref 2–14)
NEUTROPHILS NFR BLD AUTO: 36.1 % — LOW (ref 43–77)
NITRITE UR-MCNC: NEGATIVE — SIGNIFICANT CHANGE UP
NT-PROBNP SERPL-SCNC: 8 PG/ML — SIGNIFICANT CHANGE UP
PCO2 BLDA: 46 MMHG — SIGNIFICANT CHANGE UP (ref 35–48)
PCO2 BLDA: 88 MMHG — CRITICAL HIGH (ref 35–48)
PCO2 BLDV: 60 MMHG — HIGH (ref 41–51)
PCO2 BLDV: 82 MMHG — HIGH (ref 41–51)
PCP SPEC-MCNC: SIGNIFICANT CHANGE UP
PH BLDA: 7.21 — CRITICAL LOW (ref 7.35–7.45)
PH BLDA: 7.37 — SIGNIFICANT CHANGE UP (ref 7.35–7.45)
PH BLDV: 7.23 — CRITICAL LOW (ref 7.32–7.43)
PH BLDV: 7.36 — SIGNIFICANT CHANGE UP (ref 7.32–7.43)
PH UR: 6 — SIGNIFICANT CHANGE UP (ref 5–8)
PHOSPHATE SERPL-MCNC: 2.3 MG/DL — LOW (ref 2.5–4.5)
PLATELET # BLD AUTO: 227 K/UL — SIGNIFICANT CHANGE UP (ref 150–400)
PLATELET # BLD AUTO: 230 K/UL — SIGNIFICANT CHANGE UP (ref 150–400)
PO2 BLDA: 149 MMHG — HIGH (ref 83–108)
PO2 BLDA: 44 MMHG — CRITICAL LOW (ref 83–108)
PO2 BLDV: 42 MMHG — HIGH (ref 35–40)
PO2 BLDV: 42 MMHG — HIGH (ref 35–40)
POTASSIUM SERPL-MCNC: 2.9 MMOL/L — CRITICAL LOW (ref 3.5–5.3)
POTASSIUM SERPL-MCNC: 3.4 MMOL/L — LOW (ref 3.5–5.3)
POTASSIUM SERPL-SCNC: 2.9 MMOL/L — CRITICAL LOW (ref 3.5–5.3)
POTASSIUM SERPL-SCNC: 3.4 MMOL/L — LOW (ref 3.5–5.3)
PROT SERPL-MCNC: 8.1 G/DL — SIGNIFICANT CHANGE UP (ref 6.4–8.2)
PROT UR-MCNC: 30 MG/DL
PROTHROM AB SERPL-ACNC: 11.4 SEC — SIGNIFICANT CHANGE UP (ref 9.8–12.7)
RBC # BLD: 4.08 M/UL — LOW (ref 4.2–5.8)
RBC # BLD: 4.14 M/UL — LOW (ref 4.2–5.8)
RBC # FLD: 14.9 % — SIGNIFICANT CHANGE UP (ref 10.3–16.9)
RBC # FLD: 15.3 % — SIGNIFICANT CHANGE UP (ref 10.3–16.9)
SAO2 % BLDA: 64 % — LOW (ref 95–100)
SAO2 % BLDA: 99 % — SIGNIFICANT CHANGE UP (ref 95–100)
SAO2 % BLDV: 63 % — SIGNIFICANT CHANGE UP
SAO2 % BLDV: 70 % — SIGNIFICANT CHANGE UP
SODIUM SERPL-SCNC: 139 MMOL/L — SIGNIFICANT CHANGE UP (ref 135–145)
SODIUM SERPL-SCNC: 145 MMOL/L — SIGNIFICANT CHANGE UP (ref 132–145)
SP GR SPEC: >=1.03 — SIGNIFICANT CHANGE UP (ref 1–1.03)
TROPONIN I SERPL-MCNC: <0.017 NG/ML — LOW (ref 0.02–0.06)
TROPONIN I SERPL-MCNC: <0.017 NG/ML — LOW (ref 0.02–0.06)
UROBILINOGEN FLD QL: 0.2 E.U./DL — SIGNIFICANT CHANGE UP
WBC # BLD: 4.4 K/UL — SIGNIFICANT CHANGE UP (ref 3.8–10.5)
WBC # BLD: 4.4 K/UL — SIGNIFICANT CHANGE UP (ref 3.8–10.5)
WBC # FLD AUTO: 4.4 K/UL — SIGNIFICANT CHANGE UP (ref 3.8–10.5)
WBC # FLD AUTO: 4.4 K/UL — SIGNIFICANT CHANGE UP (ref 3.8–10.5)

## 2017-06-28 PROCEDURE — 71010: CPT | Mod: 26

## 2017-06-28 PROCEDURE — 71010: CPT | Mod: 26,77

## 2017-06-28 PROCEDURE — 93010 ELECTROCARDIOGRAM REPORT: CPT | Mod: 59

## 2017-06-28 PROCEDURE — 99291 CRITICAL CARE FIRST HOUR: CPT

## 2017-06-28 PROCEDURE — 70450 CT HEAD/BRAIN W/O DYE: CPT | Mod: 26

## 2017-06-28 PROCEDURE — 99220: CPT | Mod: 25

## 2017-06-28 PROCEDURE — 31500 INSERT EMERGENCY AIRWAY: CPT

## 2017-06-28 PROCEDURE — 93970 EXTREMITY STUDY: CPT | Mod: 26

## 2017-06-28 RX ORDER — METOPROLOL TARTRATE 50 MG
5 TABLET ORAL ONCE
Qty: 0 | Refills: 0 | Status: COMPLETED | OUTPATIENT
Start: 2017-06-28 | End: 2017-06-28

## 2017-06-28 RX ORDER — CEFEPIME 1 G/1
INJECTION, POWDER, FOR SOLUTION INTRAMUSCULAR; INTRAVENOUS
Qty: 0 | Refills: 0 | Status: DISCONTINUED | OUTPATIENT
Start: 2017-06-28 | End: 2017-06-28

## 2017-06-28 RX ORDER — DEXTROSE 50 % IN WATER 50 %
1 SYRINGE (ML) INTRAVENOUS ONCE
Qty: 0 | Refills: 0 | Status: DISCONTINUED | OUTPATIENT
Start: 2017-06-28 | End: 2017-06-29

## 2017-06-28 RX ORDER — VANCOMYCIN HCL 1 G
1000 VIAL (EA) INTRAVENOUS ONCE
Qty: 0 | Refills: 0 | Status: DISCONTINUED | OUTPATIENT
Start: 2017-06-28 | End: 2017-06-28

## 2017-06-28 RX ORDER — POTASSIUM CHLORIDE 20 MEQ
40 PACKET (EA) ORAL ONCE
Qty: 0 | Refills: 0 | Status: COMPLETED | OUTPATIENT
Start: 2017-06-28 | End: 2017-06-28

## 2017-06-28 RX ORDER — PROPOFOL 10 MG/ML
10 INJECTION, EMULSION INTRAVENOUS
Qty: 1000 | Refills: 0 | Status: DISCONTINUED | OUTPATIENT
Start: 2017-06-28 | End: 2017-06-28

## 2017-06-28 RX ORDER — HEPARIN SODIUM 5000 [USP'U]/ML
5000 INJECTION INTRAVENOUS; SUBCUTANEOUS EVERY 8 HOURS
Qty: 0 | Refills: 0 | Status: DISCONTINUED | OUTPATIENT
Start: 2017-06-28 | End: 2017-06-29

## 2017-06-28 RX ORDER — SODIUM CHLORIDE 9 MG/ML
1000 INJECTION INTRAMUSCULAR; INTRAVENOUS; SUBCUTANEOUS ONCE
Qty: 0 | Refills: 0 | Status: COMPLETED | OUTPATIENT
Start: 2017-06-28 | End: 2017-06-28

## 2017-06-28 RX ORDER — ETOMIDATE 2 MG/ML
30 INJECTION INTRAVENOUS ONCE
Qty: 0 | Refills: 0 | Status: COMPLETED | OUTPATIENT
Start: 2017-06-28 | End: 2017-06-28

## 2017-06-28 RX ORDER — FOLIC ACID 0.8 MG
1 TABLET ORAL DAILY
Qty: 0 | Refills: 0 | Status: DISCONTINUED | OUTPATIENT
Start: 2017-06-28 | End: 2017-06-29

## 2017-06-28 RX ORDER — METOPROLOL TARTRATE 50 MG
25 TABLET ORAL ONCE
Qty: 0 | Refills: 0 | Status: COMPLETED | OUTPATIENT
Start: 2017-06-28 | End: 2017-06-28

## 2017-06-28 RX ORDER — INSULIN LISPRO 100/ML
VIAL (ML) SUBCUTANEOUS
Qty: 0 | Refills: 0 | Status: DISCONTINUED | OUTPATIENT
Start: 2017-06-28 | End: 2017-06-29

## 2017-06-28 RX ORDER — THIAMINE MONONITRATE (VIT B1) 100 MG
100 TABLET ORAL ONCE
Qty: 0 | Refills: 0 | Status: COMPLETED | OUTPATIENT
Start: 2017-06-28 | End: 2017-06-28

## 2017-06-28 RX ORDER — DEXTROSE 50 % IN WATER 50 %
12.5 SYRINGE (ML) INTRAVENOUS ONCE
Qty: 0 | Refills: 0 | Status: DISCONTINUED | OUTPATIENT
Start: 2017-06-28 | End: 2017-06-29

## 2017-06-28 RX ORDER — GABAPENTIN 400 MG/1
300 CAPSULE ORAL EVERY 8 HOURS
Qty: 0 | Refills: 0 | Status: DISCONTINUED | OUTPATIENT
Start: 2017-06-28 | End: 2017-06-29

## 2017-06-28 RX ORDER — POTASSIUM PHOSPHATE, MONOBASIC POTASSIUM PHOSPHATE, DIBASIC 236; 224 MG/ML; MG/ML
15 INJECTION, SOLUTION INTRAVENOUS ONCE
Qty: 0 | Refills: 0 | Status: COMPLETED | OUTPATIENT
Start: 2017-06-28 | End: 2017-06-28

## 2017-06-28 RX ORDER — LABETALOL HCL 100 MG
5 TABLET ORAL ONCE
Qty: 0 | Refills: 0 | Status: DISCONTINUED | OUTPATIENT
Start: 2017-06-28 | End: 2017-06-28

## 2017-06-28 RX ORDER — DEXTROSE 50 % IN WATER 50 %
25 SYRINGE (ML) INTRAVENOUS ONCE
Qty: 0 | Refills: 0 | Status: DISCONTINUED | OUTPATIENT
Start: 2017-06-28 | End: 2017-06-29

## 2017-06-28 RX ORDER — GLUCAGON INJECTION, SOLUTION 0.5 MG/.1ML
1 INJECTION, SOLUTION SUBCUTANEOUS ONCE
Qty: 0 | Refills: 0 | Status: DISCONTINUED | OUTPATIENT
Start: 2017-06-28 | End: 2017-06-29

## 2017-06-28 RX ORDER — CEFEPIME 1 G/1
1000 INJECTION, POWDER, FOR SOLUTION INTRAMUSCULAR; INTRAVENOUS ONCE
Qty: 0 | Refills: 0 | Status: DISCONTINUED | OUTPATIENT
Start: 2017-06-28 | End: 2017-06-28

## 2017-06-28 RX ORDER — AMLODIPINE BESYLATE 2.5 MG/1
10 TABLET ORAL DAILY
Qty: 0 | Refills: 0 | Status: DISCONTINUED | OUTPATIENT
Start: 2017-06-28 | End: 2017-06-29

## 2017-06-28 RX ORDER — SODIUM CHLORIDE 9 MG/ML
1000 INJECTION, SOLUTION INTRAVENOUS
Qty: 0 | Refills: 0 | Status: DISCONTINUED | OUTPATIENT
Start: 2017-06-28 | End: 2017-06-29

## 2017-06-28 RX ORDER — IPRATROPIUM/ALBUTEROL SULFATE 18-103MCG
3 AEROSOL WITH ADAPTER (GRAM) INHALATION ONCE
Qty: 0 | Refills: 0 | Status: COMPLETED | OUTPATIENT
Start: 2017-06-28 | End: 2017-06-28

## 2017-06-28 RX ORDER — VANCOMYCIN HCL 1 G
1250 VIAL (EA) INTRAVENOUS ONCE
Qty: 0 | Refills: 0 | Status: COMPLETED | OUTPATIENT
Start: 2017-06-28 | End: 2017-06-28

## 2017-06-28 RX ORDER — CHLORHEXIDINE GLUCONATE 213 G/1000ML
15 SOLUTION TOPICAL
Qty: 0 | Refills: 0 | Status: DISCONTINUED | OUTPATIENT
Start: 2017-06-28 | End: 2017-06-28

## 2017-06-28 RX ORDER — THIAMINE MONONITRATE (VIT B1) 100 MG
100 TABLET ORAL DAILY
Qty: 0 | Refills: 0 | Status: DISCONTINUED | OUTPATIENT
Start: 2017-06-29 | End: 2017-06-29

## 2017-06-28 RX ORDER — ASPIRIN/CALCIUM CARB/MAGNESIUM 324 MG
325 TABLET ORAL ONCE
Qty: 0 | Refills: 0 | Status: COMPLETED | OUTPATIENT
Start: 2017-06-28 | End: 2017-06-28

## 2017-06-28 RX ORDER — ROCURONIUM BROMIDE 10 MG/ML
100 VIAL (ML) INTRAVENOUS ONCE
Qty: 0 | Refills: 0 | Status: COMPLETED | OUTPATIENT
Start: 2017-06-28 | End: 2017-06-28

## 2017-06-28 RX ORDER — IPRATROPIUM/ALBUTEROL SULFATE 18-103MCG
3 AEROSOL WITH ADAPTER (GRAM) INHALATION EVERY 4 HOURS
Qty: 0 | Refills: 0 | Status: DISCONTINUED | OUTPATIENT
Start: 2017-06-28 | End: 2017-06-29

## 2017-06-28 RX ADMIN — AMLODIPINE BESYLATE 10 MILLIGRAM(S): 2.5 TABLET ORAL at 17:11

## 2017-06-28 RX ADMIN — Medication 1 MILLIGRAM(S): at 18:29

## 2017-06-28 RX ADMIN — PROPOFOL 5.52 MICROGRAM(S)/KG/MIN: 10 INJECTION, EMULSION INTRAVENOUS at 08:42

## 2017-06-28 RX ADMIN — Medication 100 MILLIGRAM(S): at 07:40

## 2017-06-28 RX ADMIN — Medication 40 MILLIEQUIVALENT(S): at 04:36

## 2017-06-28 RX ADMIN — Medication 25 MILLIGRAM(S): at 05:24

## 2017-06-28 RX ADMIN — Medication 25 MILLIGRAM(S): at 04:37

## 2017-06-28 RX ADMIN — Medication 125 MILLIGRAM(S): at 04:00

## 2017-06-28 RX ADMIN — Medication 2 MILLIGRAM(S): at 15:32

## 2017-06-28 RX ADMIN — Medication 1 MILLIGRAM(S): at 19:36

## 2017-06-28 RX ADMIN — POTASSIUM PHOSPHATE, MONOBASIC POTASSIUM PHOSPHATE, DIBASIC 62.5 MILLIMOLE(S): 236; 224 INJECTION, SOLUTION INTRAVENOUS at 15:33

## 2017-06-28 RX ADMIN — HEPARIN SODIUM 5000 UNIT(S): 5000 INJECTION INTRAVENOUS; SUBCUTANEOUS at 14:31

## 2017-06-28 RX ADMIN — Medication 3 MILLILITER(S): at 13:25

## 2017-06-28 RX ADMIN — Medication 40 MILLIEQUIVALENT(S): at 14:31

## 2017-06-28 RX ADMIN — Medication 3 MILLILITER(S): at 22:20

## 2017-06-28 RX ADMIN — Medication 2: at 21:35

## 2017-06-28 RX ADMIN — Medication 100 MILLIGRAM(S): at 11:58

## 2017-06-28 RX ADMIN — GABAPENTIN 300 MILLIGRAM(S): 400 CAPSULE ORAL at 21:35

## 2017-06-28 RX ADMIN — ETOMIDATE 30 MILLIGRAM(S): 2 INJECTION INTRAVENOUS at 07:40

## 2017-06-28 RX ADMIN — Medication 325 MILLIGRAM(S): at 04:02

## 2017-06-28 RX ADMIN — Medication 3 MILLILITER(S): at 17:58

## 2017-06-28 RX ADMIN — Medication 3 MILLILITER(S): at 04:02

## 2017-06-28 RX ADMIN — Medication 2: at 15:32

## 2017-06-28 RX ADMIN — SODIUM CHLORIDE 1000 MILLILITER(S): 9 INJECTION INTRAMUSCULAR; INTRAVENOUS; SUBCUTANEOUS at 04:36

## 2017-06-28 RX ADMIN — Medication 5 MILLIGRAM(S): at 08:22

## 2017-06-28 RX ADMIN — HEPARIN SODIUM 5000 UNIT(S): 5000 INJECTION INTRAVENOUS; SUBCUTANEOUS at 21:35

## 2017-06-28 RX ADMIN — Medication 166.67 MILLIGRAM(S): at 08:48

## 2017-06-28 NOTE — H&P ADULT - PROBLEM SELECTOR PLAN 2
Patient drinks 4-5 pints of alcohol daily and report previous withdrawals which he self-medicated with alcohol.  States that is anxious and is sweating and shaking.  Baseline CIWA 15.  Endorses nausea/vomiting, has visible moderate tremor with arms extended, mildly anxious, mildly agitated, mild itching, with no auditory or visual disturbances and moderate headache.  Given 2mg Ativan on admission  - Patient comfortable s/p 2mg Ativan, will order 1mg Ativan IV q4h as patient quickly somnolent (but arousable) with 2mg dose.  -Monitor CIWA  -Thiamine, folate, multivitamin  -Aspiration precautions

## 2017-06-28 NOTE — H&P ADULT - ASSESSMENT
Patient is a 56 year old male, PMH alcohol abuse for 42 years, recently 4-5 pints daily, HTN, DMII, neuropathy, right eye blindness, transferred from [facility] after he was found by EMS on the street altered and smelling of alcohol. Patient was intubated at [facility] for airway protection and transferred to [facility]. Patient is a 56 year old male, PMH alcohol abuse for 42 years, recently 4-5 pints daily, HTN, DMII, neuropathy, right eye blindness, transferred from [facility] after he was found by EMS on the street altered and smelling of alcohol. Patient was intubated at [facility] for airway protection and transferred to [facility] MICU.  Currently being treated for alcohol withdrawal and trending CIWA.

## 2017-06-28 NOTE — ED PROVIDER NOTE - MEDICAL DECISION MAKING DETAILS
placed on bipap, likely severe intox with sleep apnea, ck co2, labs, electrolytes, alcohol level, ct brain. rule out chf, mi.

## 2017-06-28 NOTE — H&P ADULT - PROBLEM SELECTOR PLAN 1
Patient drinks 4-5 pints of alcohol daily and report previous withdrawals which he self-medicated with alcohol.  States that is anxious and is sweating and shaking.  Baseline CIWA 15.  Endorses nausea/vomiting, has visible moderate tremor with arms extended, mildly anxious, mildly agitated, mild itching, with no auditory or visual disturbances and moderate headache.  Given 2mg Ativan on admission  - 1mg Ativan PRN for Patient with hypercapneic respiratory failure, likely secondary Patient with hypercapneic respiratory failure, likely secondary to alcohol intoxication, s/p intubation and successful extubation  -Will monitor respiratory status as patient actively withdrawing

## 2017-06-28 NOTE — H&P ADULT - NSHPPHYSICALEXAM_GEN_ALL_CORE
.  VITAL SIGNS:  T(F): 98.2 (06-28-17 @ 14:19), Max: 98.8 (06-28-17 @ 10:00)  HR: 100 (06-28-17 @ 15:00) (95 - 104)  BP: 177/108 (06-28-17 @ 15:00) (147/67 - 185/94)  BP(mean): 140 (06-28-17 @ 15:00) (121 - 140)  RR: 20 (06-28-17 @ 15:00) (14 - 21)  SpO2: 100% (06-28-17 @ 15:00) (91% - 100%)    PHYSICAL EXAM:    Constitutional: WDWN resting comfortably in bed; NAD  HEENT: NC/AT, PERRL, EOMI, anicteric sclera, no nasal discharge; uvula midline, no oropharyngeal erythema or exudates; MMM  Neck: supple; no JVD or thyromegaly  Respiratory: CTA B/L; no W/R/R, no retractions  Cardiac: +S1/S2; RRR; no M/R/G; PMI non-displaced  Gastrointestinal: soft, NT/ND; no rebound or guarding; +BSx4  Back: spine midline, no bony tenderness or step-offs; no CVAT B/L  Extremities: WWP, no clubbing or cyanosis; no peripheral edema  Musculoskeletal: NROM x4; no joint swelling, tenderness or erythema  Vascular: 2+ radial, femoral, DP/PT pulses B/L  Dermatologic: skin warm, dry and intact; no rashes, wounds, or scars  Lymphatic: no submandibular or cervical LAD  Neurologic: AAOx3; CNII-XII grossly intact; no focal deficits  Psychiatric: affect and characteristics of appearance, verbalizations, behaviors are appropriate, denies SI/HI/AH/VH .  VITAL SIGNS:  T(F): 98.2 (06-28-17 @ 14:19), Max: 98.8 (06-28-17 @ 10:00)  HR: 100 (06-28-17 @ 15:00) (95 - 104)  BP: 177/108 (06-28-17 @ 15:00) (147/67 - 185/94)  BP(mean): 140 (06-28-17 @ 15:00) (121 - 140)  RR: 20 (06-28-17 @ 15:00) (14 - 21)  SpO2: 100% (06-28-17 @ 15:00) (91% - 100%)    PHYSICAL EXAM:    Constitutional: NAD, able to speak in short sentences.  HEENT: PERRL, EOMI, anicteric sclera, no nasal discharge; uvula midline, no oropharyngeal erythema or exudates; MMM  Neck: supple; no JVD or thyromegaly  Respiratory: CTA B/L; no W/R/R, no retractions  Cardiac: +S1/S2; RRR; no M/R/G; PMI non-displaced  Gastrointestinal: Distended and tender to palpation; no rebound or guarding; +BSx4  Back: spine midline, no bony tenderness or step-offs; no CVAT B/L  Extremities: WWP, no clubbing or cyanosis; no peripheral edema  Musculoskeletal: NROM x4; no joint swelling, tenderness or erythema  Vascular: 2+ radial, 2+ DP/PT pulses on left 1+ DP pulse on right  Dermatologic: skin warm, dry and intact; no rashes, wounds, or scars  Lymphatic: no submandibular or cervical LAD  Neurologic: AAOx3; CNII-XII grossly intact; no focal deficits .  VITAL SIGNS:  T(F): 98.2 (06-28-17 @ 14:19), Max: 98.8 (06-28-17 @ 10:00)  HR: 100 (06-28-17 @ 15:00) (95 - 104)  BP: 177/108 (06-28-17 @ 15:00) (147/67 - 185/94)  BP(mean): 140 (06-28-17 @ 15:00) (121 - 140)  RR: 20 (06-28-17 @ 15:00) (14 - 21)  SpO2: 100% (06-28-17 @ 15:00) (91% - 100%)    PHYSICAL EXAM:    Constitutional: NAD, able to speak in short sentences.  HEENT: PERRL, Left EOMI, anicteric sclera, no nasal discharge; uvula midline, no oropharyngeal erythema or exudates; MMM  Neck: supple; no JVD or thyromegaly  Respiratory: CTA B/L; No wheezes or rhonchi, no retractions  Cardiac: +S1/S2; RRR; no murmurs; Mild pitting edema of lower extremities, Right > Left  Gastrointestinal: Distended and tender to palpation; no rebound or guarding; +BSx4  Back: spine midline, no bony tenderness or step-offs; no CVAT B/L  Extremities: WWP, no clubbing or cyanosis; no peripheral edema  Musculoskeletal: NROM x4; no joint swelling, tenderness or erythema  Vascular: 2+ radial, 2+ DP/PT pulses on left 1+ DP pulse on right.    Dermatologic: skin warm, dry and intact; no rashes, wounds, or scars  Lymphatic: no submandibular or cervical LAD  Neurologic: AAOx3; CNII-XII grossly intact; no focal deficits .  VITAL SIGNS:  T(F): 98.2 (06-28-17 @ 14:19), Max: 98.8 (06-28-17 @ 10:00)  HR: 100 (06-28-17 @ 15:00) (95 - 104)  BP: 177/108 (06-28-17 @ 15:00) (147/67 - 185/94)  BP(mean): 140 (06-28-17 @ 15:00) (121 - 140)  RR: 20 (06-28-17 @ 15:00) (14 - 21)  SpO2: 100% (06-28-17 @ 15:00) (91% - 100%)    PHYSICAL EXAM:    Constitutional: NAD, able to speak in short sentences.  HEENT: PERRL, Left EOMI, anicteric sclera, no nasal discharge; uvula midline, no oropharyngeal erythema or exudates; MMM  Neck: supple; no JVD or thyromegaly  Respiratory: CTA B/L; No wheezes or rhonchi, no retractions  Cardiac: +S1/S2; RRR; no murmurs;   Gastrointestinal: Distended and tender to palpation; no rebound or guarding; +BSx4  Back: spine midline, no bony tenderness or step-offs; no CVAT B/L  Extremities: Mild pitting edema of lower extremities, Right > Left  Musculoskeletal: NROM x4; no joint swelling, tenderness or erythema  Vascular: 2+ radial, 2+ DP/PT pulses on left 1+ DP pulse on right.    Dermatologic: skin warm, dry and intact; no rashes, wounds, or scars  Lymphatic: no submandibular or cervical LAD  Neurologic: AAOx3; CNII-XII grossly intact; no focal deficits

## 2017-06-28 NOTE — ED PROVIDER NOTE - CRANIAL NERVE AND PUPILLARY EXAM
tongue is midline/gag reflex intact/corneal reflex intact/extra-ocular movements intact/cranial nerves 2-12 intact

## 2017-06-28 NOTE — ED CDU PROVIDER NOTE - CRANIAL NERVE AND PUPILLARY EXAM
corneal reflex intact/gag reflex intact/extra-ocular movements intact/tongue is midline/cranial nerves 2-12 intact

## 2017-06-28 NOTE — H&P ADULT - HISTORY OF PRESENT ILLNESS
Patient is a 56 year old male, PMH alcohol abuse, HTN, DMII, neuropathy, right eye blindness, transferred from [facility] after he was found by EMS on the street altered and smelling of alcohol. Patient was intubated at [facility] for airway protection and transferred to [facility].  Upon arrival to [facility], patient remained intubated briefly but then extubated successfully as he was awake and alert. He states that he does not remember going to [facility] or the transport to [facility].  He has chronic LE pain that he is currently active. He stopped taking gabapentin and his other medications in December 2016. In the hospital he says he usually get percocet for pain, and when out of the hospital he drinks to stop the pain.  He drink 4-5 pints of alcohol per day. Patient is a 56 year old male, PMH alcohol abuse for 42 years, recently 4-5 pints daily, HTN, DMII, neuropathy, right eye blindness, transferred from [facility] after he was found by EMS on the street altered and smelling of alcohol. Patient was intubated at [facility] for airway protection and transferred to [facility].  Upon arrival to [facility], patient remained intubated briefly but then extubated successfully as he was awake and alert. He states that he does not remember going to [facility] or the transport to [facility].  He has chronic LE pain that he is currently active. He stopped taking gabapentin and his other medications in December 2016. In the hospital he says he usually get percocet for pain, and when out of the hospital he drinks to stop the pain.  He drink 4-5 pints of alcohol per day. Patient is a 56 year old male, PMH alcohol abuse for 42 years, recently 4-5 pints daily, HTN, DMII, neuropathy, right eye blindness, transferred from [facility] after he was found by EMS on the street altered and smelling of alcohol. Patient was intubated at [facility] for airway protection and transferred to [facility].  Upon arrival to [facility], patient remained intubated briefly but then extubated successfully as he was awake and alert. He states that he does not remember going to [facility] or the transport to [facility].  He has chronic LE pain that is currently active. He has a history of medicine non-adherence. In the hospital he says he usually receives percocet for pain, and when out of the hospital he drinks to stop the pain.  He endorses symptoms of alcohol withdrawal in the past endorsing headache and "shakiness" which goes away after alcohol consumption.  He states that his most recent hospitalization was 2 days prior at [facility] which resulted from a day of drinking 4-5 pints of alcohol, but states that he left the hospital after 3-4 hours. Patient is a 56 year old male, PMH alcohol abuse for 42 years, recently 4-5 pints daily, HTN, DMII, neuropathy, right eye blindness, transferred from [facility] after he was found by EMS on the street altered and smelling of alcohol. Patient was intubated at [facility] for airway protection and transferred to [facility].  Upon arrival to [facility], patient remained intubated briefly but then extubated successfully as he was awake and alert. He states that he does not remember going to [facility] or the transport to [facility].  He has chronic LE pain that is currently active. He has a history of medicine non-adherence. In the hospital he says he usually receives percocet for pain, and when out of the hospital he drinks to stop the pain.  He endorses symptoms of alcohol withdrawal in the past endorsing headache and "shakiness" which goes away after alcohol consumption.  He states that his most recent hospitalization was 2 days prior at [facility] which resulted from a day of drinking 4-5 pints of alcohol, but states that he left the hospital after 3-4 hours.  Night prior to arrival in ED, patient endorses drinking 4-5 pints of alcohol.

## 2017-06-28 NOTE — ED CDU PROVIDER NOTE - OBJECTIVE STATEMENT
Patient arrives with ems, found on the street, alcohol on breath. Patient arrives altered, complains of CP, and chest pressure. poor historian. as per past medical records, anastacio visits to ed at [facility] for etoh abuse, and psychiatric admissions to [facility]. patient notes he has been off his medications for several weeks. lives in a shelter.

## 2017-06-28 NOTE — ED ADULT NURSE REASSESSMENT NOTE - NS ED NURSE REASSESS COMMENT FT1
Ultrasound guided IV placed by Dr. Rondon, 20 g to right AC. Dr. Lobo @ bedside performing ABG. Dr. Lobo states pt does not need CTA that was ordered. Dr. Lobo states pt can leave without scan.

## 2017-06-28 NOTE — ED PROVIDER NOTE - CARE PLAN
Principal Discharge DX:	Altered mental status  Secondary Diagnosis:	ETOH abuse  Secondary Diagnosis:	Sleep apnea

## 2017-06-28 NOTE — H&P ADULT - NSHPLABSRESULTS_GEN_ALL_CORE
.  LABS:                         12.9   4.4   )-----------( 227      ( 28 Jun 2017 10:58 )             38.7     06-28    139  |  98  |  8   ----------------------------<  207<H>  3.4<L>   |  22  |  0.70    Ca    8.6      28 Jun 2017 10:58  Phos  2.3     06-28  Mg     1.6     06-28    TPro  8.1  /  Alb  3.5  /  TBili  0.3  /  DBili  x   /  AST  56<H>  /  ALT  47<H>  /  AlkPhos  87  06-28    PT/INR - ( 28 Jun 2017 04:08 )   PT: 11.4 sec;   INR: 1.04          PTT - ( 28 Jun 2017 04:08 )  PTT:33.8 sec  Urinalysis Basic - ( 28 Jun 2017 08:03 )    Color: Yellow / Appearance: Hazy / SG: >=1.030 / pH: x  Gluc: x / Ketone: NEGATIVE  / Bili: NEGATIVE / Urobili: 0.2 E.U./dL   Blood: x / Protein: 30 mg/dL / Nitrite: NEGATIVE   Leuk Esterase: Moderate / RBC: < 5 /HPF / WBC Many /HPF   Sq Epi: x / Non Sq Epi: Few /HPF / Bacteria: x      CARDIAC MARKERS ( 28 Jun 2017 07:07 )  <0.017 ng/mL / x     / 549 U/L / x     / x      CARDIAC MARKERS ( 28 Jun 2017 03:50 )  <0.017 ng/mL / x     / 604 U/L / x     / x

## 2017-06-28 NOTE — ED CDU PROVIDER NOTE - CRITICAL CARE PROVIDED
interpretation of diagnostic studies/additional history taking/conducted a detailed discussion of DNR status/direct patient care (not related to procedure)/documentation

## 2017-06-28 NOTE — ED PROVIDER NOTE - OBJECTIVE STATEMENT
Patient arrives with ems, found on the street, alcohol on breath. Patient arrives altered, complains of CP, and chest pressure. poor historian. as per past medical records, anastacio visits to ed at [facility] for etoh abuse, and psychiatric admissions to [facility]. patient notes he has been off his medications for several weeks. lives in a shelter.

## 2017-06-28 NOTE — ED ADULT TRIAGE NOTE - CHIEF COMPLAINT QUOTE
Pt BIBA from [address] found laying in the street.  in the field and when sleeping 02 went to 79%. Pt states he is not compliant with his meds

## 2017-06-28 NOTE — ED ADULT NURSE REASSESSMENT NOTE - NS ED NURSE REASSESS COMMENT FT1
remains on continuous cardiac monitoring and bipap. patient drowsy but easily awoken with noxious stimuli. Follows instructions with encouragement. Safety maintained. Will continue to monitor.

## 2017-06-28 NOTE — H&P ADULT - NSHPREVIEWOFSYSTEMS_GEN_ALL_CORE
CONSTITUTIONAL: no fever and no chills. no weakness  CARDIOVASCULAR: Endorses chest pain, but states that he has had it for months.  RESPIRATORY: Chest pain, denies SOB  GASTROINTESTINAL: Endorses abdominal pain, no nausea, no vomiting, no diarrhea  GENITOURINARY-no dysuria, no hematuria  MUSCULOSKELETAL: no back pain, no musculoskeletal pain, no neck pain  NEURO: no loss of consciousness, no gait abnormality, no headache, no sensory deficits.

## 2017-06-28 NOTE — ED CDU PROVIDER NOTE - UNABLE TO OBTAIN
Urgent need for Intervention patient is altered, unable to provide a history or cooperate with physical exam.

## 2017-06-28 NOTE — ED PROVIDER NOTE - CRITICAL CARE PROVIDED
conducted a detailed discussion of DNR status/documentation/direct patient care (not related to procedure)/interpretation of diagnostic studies/additional history taking

## 2017-06-28 NOTE — PATIENT PROFILE ADULT. - VISION (WITH CORRECTIVE LENSES IF THE PATIENT USUALLY WEARS THEM):
Partially impaired: cannot see medication labels or newsprint, but can see obstacles in path, and the surrounding layout; can count fingers at arm's length/blind in R eye

## 2017-06-28 NOTE — H&P ADULT - PROBLEM SELECTOR PLAN 3
Persistently hypertensive, likely combination of untreated essential hypertension and alcohol withdrawal  -Restarted Amlodipine 10mg (past home med)  -Monitor BP  -Restart home med Lisinopril if blood pressure remains elevated

## 2017-06-28 NOTE — H&P ADULT - NSHPSOCIALHISTORY_GEN_ALL_CORE
Patient currently living in a homeless shelter, but states that he has an apartment in [place] that he shares with a roommate.  He recently (3 weeks ago) ended a relationship with his girlfriend and states that this made it more difficult for him to pickup his medications.  He drinks alcohol daily (4-5 pints) to alleviate symptoms of alcohol withdrawal and pain in his chest and legs.  He denies Cocaine or IV drug use. Patient currently living in a homeless shelter, but states that he has an apartment in [place] that he shares with a roommate.  He recently (3 weeks ago) ended a relationship with his girlfriend and states that this made it more difficult for him to pickup his medications.  He drinks alcohol daily (4-5 pints) to alleviate symptoms of alcohol withdrawal and pain in his chest and legs.  He denies Cocaine or IV drug use.  Sexually active, using barrier protection most times.  No history of STDs.

## 2017-06-29 VITALS — TEMPERATURE: 98 F | RESPIRATION RATE: 20 BRPM | OXYGEN SATURATION: 97 % | HEART RATE: 102 BPM

## 2017-06-29 LAB
-  CANDIDA ALBICANS: SIGNIFICANT CHANGE UP
-  CANDIDA GLABRATA: SIGNIFICANT CHANGE UP
-  CANDIDA KRUSEI: SIGNIFICANT CHANGE UP
-  CANDIDA PARAPSILOSIS: SIGNIFICANT CHANGE UP
-  CANDIDA TROPICALIS: SIGNIFICANT CHANGE UP
-  COAGULASE NEGATIVE STAPHYLOCOCCUS: SIGNIFICANT CHANGE UP
-  K. PNEUMONIAE GROUP: SIGNIFICANT CHANGE UP
-  KPC RESISTANCE GENE: SIGNIFICANT CHANGE UP
-  STREPTOCOCCUS SP. (NOT GRP A, B OR S PNEUMONIAE): SIGNIFICANT CHANGE UP
A BAUMANNII DNA SPEC QL NAA+PROBE: SIGNIFICANT CHANGE UP
ANION GAP SERPL CALC-SCNC: 8 MMOL/L — SIGNIFICANT CHANGE UP (ref 5–17)
BUN SERPL-MCNC: 6 MG/DL — LOW (ref 7–23)
CALCIUM SERPL-MCNC: 8.4 MG/DL — SIGNIFICANT CHANGE UP (ref 8.4–10.5)
CHLORIDE SERPL-SCNC: 101 MMOL/L — SIGNIFICANT CHANGE UP (ref 96–108)
CO2 SERPL-SCNC: 32 MMOL/L — HIGH (ref 22–31)
CREAT SERPL-MCNC: 0.7 MG/DL — SIGNIFICANT CHANGE UP (ref 0.5–1.3)
E CLOAC COMP DNA BLD POS QL NAA+PROBE: SIGNIFICANT CHANGE UP
E COLI DNA BLD POS QL NAA+NON-PROBE: SIGNIFICANT CHANGE UP
ENTEROCOC DNA BLD POS QL NAA+NON-PROBE: SIGNIFICANT CHANGE UP
ENTEROCOC DNA BLD POS QL NAA+NON-PROBE: SIGNIFICANT CHANGE UP
GLUCOSE SERPL-MCNC: 117 MG/DL — HIGH (ref 70–99)
GP B STREP DNA BLD POS QL NAA+NON-PROBE: SIGNIFICANT CHANGE UP
GRAM STN FLD: SIGNIFICANT CHANGE UP
GRAM STN FLD: SIGNIFICANT CHANGE UP
HAEM INFLU DNA BLD POS QL NAA+NON-PROBE: SIGNIFICANT CHANGE UP
HCT VFR BLD CALC: 37.5 % — LOW (ref 39–50)
HGB BLD-MCNC: 12.2 G/DL — LOW (ref 13–17)
K OXYTOCA DNA BLD POS QL NAA+NON-PROBE: SIGNIFICANT CHANGE UP
L MONOCYTOG DNA BLD POS QL NAA+NON-PROBE: SIGNIFICANT CHANGE UP
MAGNESIUM SERPL-MCNC: 1.9 MG/DL — SIGNIFICANT CHANGE UP (ref 1.6–2.6)
MCHC RBC-ENTMCNC: 31.1 PG — SIGNIFICANT CHANGE UP (ref 27–34)
MCHC RBC-ENTMCNC: 32.5 G/DL — SIGNIFICANT CHANGE UP (ref 32–36)
MCV RBC AUTO: 95.7 FL — SIGNIFICANT CHANGE UP (ref 80–100)
METHOD TYPE: SIGNIFICANT CHANGE UP
MRSA SPEC QL CULT: SIGNIFICANT CHANGE UP
MSSA DNA SPEC QL NAA+PROBE: SIGNIFICANT CHANGE UP
N MEN ISLT CULT: SIGNIFICANT CHANGE UP
P AERUGINOSA DNA BLD POS NAA+NON-PROBE: SIGNIFICANT CHANGE UP
PHOSPHATE SERPL-MCNC: 3 MG/DL — SIGNIFICANT CHANGE UP (ref 2.5–4.5)
PLATELET # BLD AUTO: 227 K/UL — SIGNIFICANT CHANGE UP (ref 150–400)
POTASSIUM SERPL-MCNC: 4 MMOL/L — SIGNIFICANT CHANGE UP (ref 3.5–5.3)
POTASSIUM SERPL-SCNC: 4 MMOL/L — SIGNIFICANT CHANGE UP (ref 3.5–5.3)
PROTEUS SP DNA BLD POS QL NAA+NON-PROBE: SIGNIFICANT CHANGE UP
RBC # BLD: 3.92 M/UL — LOW (ref 4.2–5.8)
RBC # FLD: 15.6 % — SIGNIFICANT CHANGE UP (ref 10.3–16.9)
S MARCESCENS DNA BLD POS NAA+NON-PROBE: SIGNIFICANT CHANGE UP
S PNEUM DNA BLD POS QL NAA+NON-PROBE: SIGNIFICANT CHANGE UP
S PYO DNA BLD POS QL NAA+NON-PROBE: SIGNIFICANT CHANGE UP
SODIUM SERPL-SCNC: 141 MMOL/L — SIGNIFICANT CHANGE UP (ref 135–145)
WBC # BLD: 7.5 K/UL — SIGNIFICANT CHANGE UP (ref 3.8–10.5)
WBC # FLD AUTO: 7.5 K/UL — SIGNIFICANT CHANGE UP (ref 3.8–10.5)

## 2017-06-29 PROCEDURE — 71010: CPT | Mod: 26

## 2017-06-29 PROCEDURE — 93010 ELECTROCARDIOGRAM REPORT: CPT

## 2017-06-29 RX ORDER — AMLODIPINE BESYLATE 2.5 MG/1
1 TABLET ORAL
Qty: 30 | Refills: 0 | OUTPATIENT
Start: 2017-06-29

## 2017-06-29 RX ORDER — BUDESONIDE AND FORMOTEROL FUMARATE DIHYDRATE 160; 4.5 UG/1; UG/1
2 AEROSOL RESPIRATORY (INHALATION)
Qty: 0 | Refills: 0 | Status: DISCONTINUED | OUTPATIENT
Start: 2017-06-29 | End: 2017-06-29

## 2017-06-29 RX ORDER — LOSARTAN POTASSIUM 100 MG/1
1 TABLET, FILM COATED ORAL
Qty: 30 | Refills: 0 | OUTPATIENT
Start: 2017-06-29 | End: 2017-07-29

## 2017-06-29 RX ORDER — ATORVASTATIN CALCIUM 80 MG/1
1 TABLET, FILM COATED ORAL
Qty: 30 | Refills: 0 | OUTPATIENT
Start: 2017-06-29 | End: 2017-07-29

## 2017-06-29 RX ORDER — METFORMIN HYDROCHLORIDE 850 MG/1
1 TABLET ORAL
Qty: 60 | Refills: 0 | OUTPATIENT
Start: 2017-06-29 | End: 2017-07-29

## 2017-06-29 RX ORDER — BUDESONIDE AND FORMOTEROL FUMARATE DIHYDRATE 160; 4.5 UG/1; UG/1
2 AEROSOL RESPIRATORY (INHALATION)
Qty: 1 | Refills: 0 | OUTPATIENT
Start: 2017-06-29 | End: 2017-07-29

## 2017-06-29 RX ADMIN — Medication 3 MILLILITER(S): at 09:00

## 2017-06-29 RX ADMIN — HEPARIN SODIUM 5000 UNIT(S): 5000 INJECTION INTRAVENOUS; SUBCUTANEOUS at 06:52

## 2017-06-29 RX ADMIN — Medication 4: at 12:07

## 2017-06-29 RX ADMIN — Medication 1 TABLET(S): at 12:07

## 2017-06-29 RX ADMIN — HEPARIN SODIUM 5000 UNIT(S): 5000 INJECTION INTRAVENOUS; SUBCUTANEOUS at 14:35

## 2017-06-29 RX ADMIN — Medication 100 MILLIGRAM(S): at 12:07

## 2017-06-29 RX ADMIN — Medication 1 MILLIGRAM(S): at 12:07

## 2017-06-29 RX ADMIN — AMLODIPINE BESYLATE 10 MILLIGRAM(S): 2.5 TABLET ORAL at 06:52

## 2017-06-29 RX ADMIN — Medication 1 MILLIGRAM(S): at 12:55

## 2017-06-29 RX ADMIN — GABAPENTIN 300 MILLIGRAM(S): 400 CAPSULE ORAL at 14:35

## 2017-06-29 RX ADMIN — GABAPENTIN 300 MILLIGRAM(S): 400 CAPSULE ORAL at 06:52

## 2017-06-29 RX ADMIN — Medication 3 MILLILITER(S): at 13:38

## 2017-06-29 NOTE — DISCHARGE NOTE ADULT - CONDITIONS AT DISCHARGE
Pt stable at d/c home, pt provided  [employer] card, and verbalized good understanding of D/C plan, IVP d/c clean and intact, pt leave unit via WC>

## 2017-06-29 NOTE — DISCHARGE NOTE ADULT - PROVIDER TOKENS
FREE:[LAST:[[facility]],FIRST:[[facility]],PHONE:[(   )    -],FAX:[(   )    -],ADDRESS:[[address] [place] [address]  [phone]]]

## 2017-06-29 NOTE — PROGRESS NOTE ADULT - SUBJECTIVE AND OBJECTIVE BOX
INTERVAL HPI/OVERNIGHT EVENTS:    SUBJECTIVE: Patient seen and examined at bedside.     ROS:  CV: Denies chest pain  Resp: Denies SOB  GI: Denies abdominal pain, constipation, diarrhea, nausea, vomiting  : Denies dysuria  ID: Denies fevers, chills  MSK: Denies joint pain     OBJECTIVE:    VITAL SIGNS:  ICU Vital Signs Last 24 Hrs  T(C): 36.9 (29 Jun 2017 01:00), Max: 37.1 (28 Jun 2017 10:00)  T(F): 98.5 (29 Jun 2017 01:00), Max: 98.8 (28 Jun 2017 10:00)  HR: 98 (29 Jun 2017 05:00) (94 - 112)  BP: 134/70 (29 Jun 2017 05:00) (126/71 - 185/94)  BP(mean): 98 (29 Jun 2017 05:00) (92 - 140)  ABP: --  ABP(mean): --  RR: 11 (29 Jun 2017 05:00) (11 - 25)  SpO2: 96% (29 Jun 2017 05:00) (91% - 100%)    Mode: CPAP with PS, FiO2: 40, PEEP: 8, PS: 8, PIP: 17    06-28 @ 07:01  -  06-29 @ 06:42  --------------------------------------------------------  IN: 646.9 mL / OUT: 1580 mL / NET: -933.1 mL      CAPILLARY BLOOD GLUCOSE  159 (28 Jun 2017 15:00)          PHYSICAL EXAM:    PHYSICAL EXAM:    Constitutional: NAD, able to speak in short sentences.  HEENT: PERRL, Left EOMI, anicteric sclera, no nasal discharge; uvula midline, no oropharyngeal erythema or exudates; MMM  Neck: supple; no JVD or thyromegaly  Respiratory: CTA B/L; No wheezes or rhonchi, no retractions  Cardiac: +S1/S2; RRR; no murmurs;   Gastrointestinal: Distended and tender to palpation; no rebound or guarding; +BSx4  Back: spine midline, no bony tenderness or step-offs; no CVAT B/L  Extremities: Mild pitting edema of lower extremities, Right > Left  Musculoskeletal: NROM x4; no joint swelling, tenderness or erythema  Vascular: 2+ radial, 2+ DP/PT pulses on left 1+ DP pulse on right.    Dermatologic: skin warm, dry and intact; no rashes, wounds, or scars  Lymphatic: no submandibular or cervical LAD  Neurologic: AAOx3; CNII-XII grossly intact; no focal deficits    MEDICATIONS:  MEDICATIONS  (STANDING):  thiamine 100 milliGRAM(s) Oral daily  ALBUTerol/ipratropium for Nebulization 3 milliLiter(s) Nebulizer every 4 hours  heparin  Injectable 5000 Unit(s) SubCutaneous every 8 hours  multivitamin 1 Tablet(s) Oral daily  folic acid 1 milliGRAM(s) Oral daily  insulin lispro (HumaLOG) corrective regimen sliding scale   SubCutaneous Before meals and at bedtime  dextrose 5%. 1000 milliLiter(s) (50 mL/Hr) IV Continuous <Continuous>  dextrose 50% Injectable 12.5 Gram(s) IV Push once  dextrose 50% Injectable 25 Gram(s) IV Push once  dextrose 50% Injectable 25 Gram(s) IV Push once  gabapentin 300 milliGRAM(s) Oral every 8 hours  amLODIPine   Tablet 10 milliGRAM(s) Oral daily    MEDICATIONS  (PRN):  dextrose Gel 1 Dose(s) Oral once PRN Blood Glucose LESS THAN 70 milliGRAM(s)/deciliter  glucagon  Injectable 1 milliGRAM(s) IntraMuscular once PRN Glucose LESS THAN 70 milligrams/deciliter  LORazepam   Injectable 1 milliGRAM(s) IV Push every 4 hours PRN alcohol withdrawal      ALLERGIES:  Allergies    No Known Allergies    Intolerances        LABS:                        12.9   4.4   )-----------( 227      ( 28 Jun 2017 10:58 )             38.7     06-28    139  |  98  |  8   ----------------------------<  207<H>  3.4<L>   |  22  |  0.70    Ca    8.6      28 Jun 2017 10:58  Phos  2.3     06-28  Mg     1.6     06-28    TPro  8.1  /  Alb  3.5  /  TBili  0.3  /  DBili  x   /  AST  56<H>  /  ALT  47<H>  /  AlkPhos  87  06-28    PT/INR - ( 28 Jun 2017 04:08 )   PT: 11.4 sec;   INR: 1.04          PTT - ( 28 Jun 2017 04:08 )  PTT:33.8 sec  Urinalysis Basic - ( 28 Jun 2017 08:03 )    Color: Yellow / Appearance: Hazy / SG: >=1.030 / pH: x  Gluc: x / Ketone: NEGATIVE  / Bili: NEGATIVE / Urobili: 0.2 E.U./dL   Blood: x / Protein: 30 mg/dL / Nitrite: NEGATIVE   Leuk Esterase: Moderate / RBC: < 5 /HPF / WBC Many /HPF   Sq Epi: x / Non Sq Epi: Few /HPF / Bacteria: x        RADIOLOGY & ADDITIONAL TESTS: Reviewed.

## 2017-06-29 NOTE — DISCHARGE NOTE ADULT - PATIENT PORTAL LINK FT
“You can access the [employer] Patient Portal, offered by [facility], by registering with the following website: [email or web address]”

## 2017-06-29 NOTE — DISCHARGE NOTE ADULT - VISION (WITH CORRECTIVE LENSES IF THE PATIENT USUALLY WEARS THEM):
blind in R eye/Partially impaired: cannot see medication labels or newsprint, but can see obstacles in path, and the surrounding layout; can count fingers at arm's length

## 2017-06-29 NOTE — DISCHARGE NOTE ADULT - CARE PLAN
Principal Discharge DX:	Alcohol withdrawal syndrome without complication  Goal:	.  Instructions for follow-up, activity and diet:	You came in altered due to drinking too much alcohol. You were intubated in the emergency department because you could not protect your airway. You were extubated after you arrived at [facility]. You were treated for alcohol withdrawal, please abstain from alcohol. Principal Discharge DX:	Alcohol withdrawal syndrome without complication  Goal:	.  Instructions for follow-up, activity and diet:	You came in altered due to drinking too much alcohol. You were intubated in the emergency department because you could not protect your airway. You were extubated after you arrived at [facility]. You were treated for alcohol withdrawal, please abstain from alcohol.

## 2017-06-29 NOTE — DISCHARGE NOTE ADULT - HOSPITAL COURSE
Patient is a 56 year old male, PMH alcohol abuse for 42 years, recently 4-5 pints daily, HTN, DMII, neuropathy, right eye blindness, transferred from [facility] after he was found by EMS on the street altered and smelling of alcohol. Patient was intubated at [facility] for airway protection and transferred to [facility].  Upon arrival to [facility], patient remained intubated briefly but then extubated successfully as he was awake and alert. He states that he does not remember going to [facility] or the transport to [facility].  He has chronic LE pain that is currently active. He has a history of medicine non-adherence. In the hospital he says he usually receives percocet for pain, and when out of the hospital he drinks to stop the pain.  He endorses symptoms of alcohol withdrawal in the past endorsing headache and "shakiness" which goes away after alcohol consumption.  He states that his most recent hospitalization was 2 days prior at [facility] which resulted from a day of drinking 4-5 pints of alcohol, but states that he left the hospital after 3-4 hours.  Night prior to arrival in ED, patient endorses drinking 4-5 pints of alcohol.Patient was intubated at [facility], brought to [facility] ICU and extubated rather quickly. He was treated with ativan for alcohol withdrawal and currently has a CIWA of 0. Patient is stable for discharge.

## 2017-06-29 NOTE — PROGRESS NOTE ADULT - ASSESSMENT
Patient is a 56 year old male, PMH alcohol abuse for 42 years, recently 4-5 pints daily, HTN, DMII, neuropathy, right eye blindness, transferred from [facility] after he was found by EMS on the street altered and smelling of alcohol. Patient was intubated at [facility] for airway protection and transferred to [facility] MICU.  Currently being treated for alcohol withdrawal and trending CIWA.      Problem/Plan - 1:  ·  Problem: Respiratory failure with hypercapnia.  Plan: Patient with hypercapneic respiratory failure, likely secondary to alcohol intoxication, s/p intubation and successful extubation  -Will monitor respiratory status as patient actively withdrawing.     Problem/Plan - 2:  ·  Problem: Alcohol withdrawal syndrome without complication.  Plan: Patient drinks 4-5 pints of alcohol daily and report previous withdrawals which he self-medicated with alcohol.  States that is anxious and is sweating and shaking.  Baseline CIWA 15.  Endorses nausea/vomiting, has visible moderate tremor with arms extended, mildly anxious, mildly agitated, mild itching, with no auditory or visual disturbances and moderate headache.  Given 2mg Ativan on admission  - Patient comfortable s/p 2mg Ativan, will order 1mg Ativan IV q4h as patient quickly somnolent (but arousable) with 2mg dose.  -Monitor CIWA  -Thiamine, folate, multivitamin  -Aspiration precautions.     Problem/Plan - 3:  ·  Problem: HTN (hypertension).  Plan: Persistently hypertensive, likely combination of untreated essential hypertension and alcohol withdrawal  -Restarted Amlodipine 10mg (past home med)  -Monitor BP  -Restart home med Lisinopril if blood pressure remains elevated.     Problem/Plan - 4:  ·  Problem: Lower extremity edema.  Plan: asymmetric LE edema, right > left  check LE dopplers.     Problem/Plan - 5:  ·  Problem: DM (diabetes mellitus).  Plan: ISS, FS.     Problem/Plan - 6:  Problem: Need for prophylactic measure. Plan: HSQ.    Problem/Plan - 7:  ·  Problem: Nutrition, metabolism, and development symptoms.  Plan: Diab

## 2017-06-29 NOTE — DISCHARGE NOTE ADULT - PLAN OF CARE
. You came in altered due to drinking too much alcohol. You were intubated in the emergency department because you could not protect your airway. You were extubated after you arrived at [facility]. You were treated for alcohol withdrawal, please abstain from alcohol.

## 2017-06-29 NOTE — DISCHARGE NOTE ADULT - MEDICATION SUMMARY - MEDICATIONS TO TAKE
I will START or STAY ON the medications listed below when I get home from the hospital:    aspirin 81 mg oral tablet, chewable  -- 1 tab(s) by mouth once a day  -- Indication: For Cardioprotection    acetaminophen 325 mg oral tablet  -- 2 tab(s) by mouth every 6 hours, As needed, Mild Pain (1 - 3)  -- Indication: For Pain    ibuprofen 400 mg oral tablet  -- 1 tab(s) by mouth every 6 hours, As Needed for pain  -- Indication: For Pain    losartan 50 mg oral tablet  -- 1 tab(s) by mouth once a day  -- Indication: For HTN (hypertension)    gabapentin 300 mg oral capsule  -- 1 cap(s) by mouth every 8 hours  -- Indication: For Neuropathy    metFORMIN 1000 mg oral tablet  -- 1 tab(s) by mouth 2 times a day  -- Check with your doctor before becoming pregnant.  Do not drink alcoholic beverages when taking this medication.  It is very important that you take or use this exactly as directed.  Do not skip doses or discontinue unless directed by your doctor.  Obtain medical advice before taking any non-prescription drugs as some may affect the action of this medication.  Take with food or milk.    -- Indication: For DM (diabetes mellitus)    atorvastatin 80 mg oral tablet  -- 1 tab(s) by mouth once a day (at bedtime)  -- Indication: For High Cholesterol    Symbicort 160 mcg-4.5 mcg/inh inhalation aerosol  -- 2 puff(s) inhaled 2 times a day  -- Indication: For Asthma    Norvasc 10 mg oral tablet  -- 1 tab(s) by mouth once a day  -- Indication: For HTN (hypertension)    hydrocortisone 1% topical cream  -- 1 application on skin 2 times a day to buttocks x 1 week  -- Indication: For Rash    triamcinolone 0.1% topical cream  -- 1 application on skin 3 times a day to back x 1 week  -- Indication: For Rash    glycerin adult rectal suppository  -- 1 suppository(ies) rectally once a day  -- Indication: For Constipation    docusate sodium 100 mg oral capsule  -- 1 cap(s) by mouth 3 times a day  -- Indication: For Constipation    polyethylene glycol 3350 oral powder for reconstitution  -- 17 gram(s) by mouth once a day, As needed, Constipation  -- Indication: For Constipation    Multiple Vitamins oral tablet  -- 1 tab(s) by mouth once a day  -- Indication: For Nutrition, metabolism, and development symptoms    folic acid 1 mg oral tablet  -- 1 tab(s) by mouth once a day  -- Indication: For Nutrition, metabolism, and development symptoms    thiamine 100 mg oral tablet  -- 1 tab(s) by mouth once a day  -- Indication: For Nutrition, metabolism, and development symptoms

## 2017-06-30 ENCOUNTER — INPATIENT (INPATIENT)
Facility: HOSPITAL | Age: 57
LOS: 2 days | Discharge: HOME | DRG: 896 | End: 2017-07-03
Attending: INTERNAL MEDICINE | Admitting: INTERNAL MEDICINE
Payer: COMMERCIAL

## 2017-06-30 VITALS
TEMPERATURE: 99 F | RESPIRATION RATE: 18 BRPM | OXYGEN SATURATION: 94 % | WEIGHT: 229.94 LBS | DIASTOLIC BLOOD PRESSURE: 59 MMHG | HEART RATE: 138 BPM | SYSTOLIC BLOOD PRESSURE: 132 MMHG

## 2017-06-30 DIAGNOSIS — Z98.890 OTHER SPECIFIED POSTPROCEDURAL STATES: Chronic | ICD-10-CM

## 2017-06-30 LAB
ALBUMIN SERPL ELPH-MCNC: 3.9 G/DL — SIGNIFICANT CHANGE UP (ref 3.3–5)
ALP SERPL-CCNC: 78 U/L — SIGNIFICANT CHANGE UP (ref 40–120)
ALT FLD-CCNC: 33 U/L — SIGNIFICANT CHANGE UP (ref 10–45)
ANION GAP SERPL CALC-SCNC: 16 MMOL/L — SIGNIFICANT CHANGE UP (ref 5–17)
AST SERPL-CCNC: 53 U/L — HIGH (ref 10–40)
BASOPHILS NFR BLD AUTO: 0.6 % — SIGNIFICANT CHANGE UP (ref 0–2)
BILIRUB SERPL-MCNC: 0.2 MG/DL — SIGNIFICANT CHANGE UP (ref 0.2–1.2)
BUN SERPL-MCNC: 11 MG/DL — SIGNIFICANT CHANGE UP (ref 7–23)
CALCIUM SERPL-MCNC: 9.2 MG/DL — SIGNIFICANT CHANGE UP (ref 8.4–10.5)
CHLORIDE SERPL-SCNC: 98 MMOL/L — SIGNIFICANT CHANGE UP (ref 96–108)
CO2 SERPL-SCNC: 28 MMOL/L — SIGNIFICANT CHANGE UP (ref 22–31)
CREAT SERPL-MCNC: 0.8 MG/DL — SIGNIFICANT CHANGE UP (ref 0.5–1.3)
CULTURE RESULTS: NO GROWTH — SIGNIFICANT CHANGE UP
CULTURE RESULTS: SIGNIFICANT CHANGE UP
CULTURE RESULTS: SIGNIFICANT CHANGE UP
EOSINOPHIL NFR BLD AUTO: 1.1 % — SIGNIFICANT CHANGE UP (ref 0–6)
ETHANOL SERPL-MCNC: 165 MG/DL — HIGH (ref 0–10)
GLUCOSE SERPL-MCNC: 142 MG/DL — HIGH (ref 70–99)
HCT VFR BLD CALC: 39.3 % — SIGNIFICANT CHANGE UP (ref 39–50)
HGB BLD-MCNC: 13.1 G/DL — SIGNIFICANT CHANGE UP (ref 13–17)
LYMPHOCYTES # BLD AUTO: 34.9 % — SIGNIFICANT CHANGE UP (ref 13–44)
MCHC RBC-ENTMCNC: 30.9 PG — SIGNIFICANT CHANGE UP (ref 27–34)
MCHC RBC-ENTMCNC: 33.3 G/DL — SIGNIFICANT CHANGE UP (ref 32–36)
MCV RBC AUTO: 92.7 FL — SIGNIFICANT CHANGE UP (ref 80–100)
MONOCYTES NFR BLD AUTO: 11.1 % — SIGNIFICANT CHANGE UP (ref 2–14)
NEUTROPHILS NFR BLD AUTO: 52.3 % — SIGNIFICANT CHANGE UP (ref 43–77)
ORGANISM # SPEC MICROSCOPIC CNT: SIGNIFICANT CHANGE UP
ORGANISM # SPEC MICROSCOPIC CNT: SIGNIFICANT CHANGE UP
PLATELET # BLD AUTO: 286 K/UL — SIGNIFICANT CHANGE UP (ref 150–400)
POTASSIUM SERPL-MCNC: 4.2 MMOL/L — SIGNIFICANT CHANGE UP (ref 3.5–5.3)
POTASSIUM SERPL-SCNC: 4.2 MMOL/L — SIGNIFICANT CHANGE UP (ref 3.5–5.3)
PROT SERPL-MCNC: 7.7 G/DL — SIGNIFICANT CHANGE UP (ref 6–8.3)
RBC # BLD: 4.24 M/UL — SIGNIFICANT CHANGE UP (ref 4.2–5.8)
RBC # FLD: 15.3 % — SIGNIFICANT CHANGE UP (ref 10.3–16.9)
SODIUM SERPL-SCNC: 142 MMOL/L — SIGNIFICANT CHANGE UP (ref 135–145)
SPECIMEN SOURCE: SIGNIFICANT CHANGE UP
WBC # BLD: 4.7 K/UL — SIGNIFICANT CHANGE UP (ref 3.8–10.5)
WBC # FLD AUTO: 4.7 K/UL — SIGNIFICANT CHANGE UP (ref 3.8–10.5)

## 2017-06-30 PROCEDURE — 93010 ELECTROCARDIOGRAM REPORT: CPT

## 2017-06-30 PROCEDURE — 99285 EMERGENCY DEPT VISIT HI MDM: CPT | Mod: 25

## 2017-06-30 RX ORDER — SODIUM CHLORIDE 9 MG/ML
1000 INJECTION INTRAMUSCULAR; INTRAVENOUS; SUBCUTANEOUS ONCE
Qty: 0 | Refills: 0 | Status: COMPLETED | OUTPATIENT
Start: 2017-06-30 | End: 2017-06-30

## 2017-06-30 RX ORDER — METOCLOPRAMIDE HCL 10 MG
10 TABLET ORAL ONCE
Qty: 0 | Refills: 0 | Status: COMPLETED | OUTPATIENT
Start: 2017-06-30 | End: 2017-06-30

## 2017-06-30 RX ADMIN — SODIUM CHLORIDE 1000 MILLILITER(S): 9 INJECTION INTRAMUSCULAR; INTRAVENOUS; SUBCUTANEOUS at 21:06

## 2017-06-30 RX ADMIN — Medication 25 MILLIGRAM(S): at 21:06

## 2017-06-30 RX ADMIN — Medication 10 MILLIGRAM(S): at 21:06

## 2017-06-30 RX ADMIN — Medication 25 MILLIGRAM(S): at 23:52

## 2017-06-30 RX ADMIN — SODIUM CHLORIDE 2000 MILLILITER(S): 9 INJECTION INTRAMUSCULAR; INTRAVENOUS; SUBCUTANEOUS at 22:19

## 2017-06-30 RX ADMIN — SODIUM CHLORIDE 2000 MILLILITER(S): 9 INJECTION INTRAMUSCULAR; INTRAVENOUS; SUBCUTANEOUS at 21:36

## 2017-06-30 RX ADMIN — Medication 1 MILLIGRAM(S): at 21:34

## 2017-06-30 NOTE — ED PROVIDER NOTE - MEDICAL DECISION MAKING DETAILS
clinically intoxicated on arrival.  reports shortness of breath but difficult historian due to AOB.  cxr without infiltrate.  labs wnl except for elevated alcohol.  possible early withdrawal vs more likely dehydration/intoxication related tachycardia.  given ivf/ librium with improvement.  signed out to Dr. Hadley/ BEATRIZ Sandoval pending reassessment/ check for withdrawal symptoms/ clinical sobriety

## 2017-06-30 NOTE — ED ADULT NURSE NOTE - OBJECTIVE STATEMENT
pt. presented with c/o shortness of breath, midsternal chest pain, nausea, abdominal pain, pt. reports hx of etoh abuse with last drink being today an hour ago, pt. denies any drug use, pt. is alert, awake, cooperative, tachycardia noted, blood was sent, fluids initiated, will monitor.

## 2017-06-30 NOTE — ED ADULT NURSE REASSESSMENT NOTE - NS ED NURSE REASSESS COMMENT FT1
meds administered, pt. is sleeping, responsive to all stimuli, breathing regular unlabored, skin warm, dry, cardiac monitor in progress, sinus rhythm tachycardia, md mcgraw aware, fluids bolus in progress, will monitor.

## 2017-06-30 NOTE — ED PROVIDER NOTE - PHYSICAL EXAMINATION
· CONSTITUTIONAL: smells of alcohol, well nourished, awake, alert, oriented to person,  and in no apparent distress.  · ENMT: Airway patent, Nasal mucosa clear. Mouth with normal mucosa.  · HEAD: Head is atraumatic. Head shape is symmetrical.  · EYES: Clear bilaterally, pupils equal, round and reactive to light.  · CARDIAC: tachycardic, regular rhythm.  Heart sounds S1, S2.  · RESPIRATORY: Breath sounds clear and equal bilaterally.  · GASTROINTESTINAL: Abdomen soft, no guarding.  · MUSCULOSKELETAL: Spine appears normal, range of motion is not limited  · NEUROLOGICAL: Alert and oriented to self, speech slurred, not following formal neuro exam  · SKIN: Skin normal color for race, warm, dry and intact. No evidence of rash.  · PSYCHIATRIC: Alert and oriented to person. intoxicated affect. no apparent risk to self or others.  · HEME LYMPH: No adenopathy

## 2017-06-30 NOTE — ED ADULT NURSE REASSESSMENT NOTE - NS ED NURSE REASSESS COMMENT FT1
Patient sitting on edge of bed stating feeling shaky. States last drink today. Patient medicated per order with 1mg of Lorazepam and 1 liter NS. Report to RN Seema. Patient on cardiac monitor. Patient sitting on edge of bed stating feeling shaky. States last drink today. Patient medicated per order with 1mg of Lorazepam and 1 liter NS. Report to RN Seema. Patient on cardiac monitor. Patient with side rails up X 2.

## 2017-06-30 NOTE — ED PROVIDER NOTE - OBJECTIVE STATEMENT
here feeling intoxicated and short of breath.  Discharged from the hospital 2 days ago and says he has been hanging with his girlfriend and drinking since discharge. Drank a lot today (unable to quantify) of vodka.  Denies trauma.  Unable to provide further coherent history

## 2017-07-01 DIAGNOSIS — I10 ESSENTIAL (PRIMARY) HYPERTENSION: ICD-10-CM

## 2017-07-01 DIAGNOSIS — R78.81 BACTEREMIA: ICD-10-CM

## 2017-07-01 DIAGNOSIS — Z29.9 ENCOUNTER FOR PROPHYLACTIC MEASURES, UNSPECIFIED: ICD-10-CM

## 2017-07-01 DIAGNOSIS — E11.9 TYPE 2 DIABETES MELLITUS WITHOUT COMPLICATIONS: ICD-10-CM

## 2017-07-01 DIAGNOSIS — E78.5 HYPERLIPIDEMIA, UNSPECIFIED: ICD-10-CM

## 2017-07-01 DIAGNOSIS — F10.239 ALCOHOL DEPENDENCE WITH WITHDRAWAL, UNSPECIFIED: ICD-10-CM

## 2017-07-01 LAB
ANION GAP SERPL CALC-SCNC: 10 MMOL/L — SIGNIFICANT CHANGE UP (ref 5–17)
BUN SERPL-MCNC: 8 MG/DL — SIGNIFICANT CHANGE UP (ref 7–23)
CALCIUM SERPL-MCNC: 8.7 MG/DL — SIGNIFICANT CHANGE UP (ref 8.4–10.5)
CHLORIDE SERPL-SCNC: 97 MMOL/L — SIGNIFICANT CHANGE UP (ref 96–108)
CO2 SERPL-SCNC: 30 MMOL/L — SIGNIFICANT CHANGE UP (ref 22–31)
CREAT SERPL-MCNC: 0.9 MG/DL — SIGNIFICANT CHANGE UP (ref 0.5–1.3)
GLUCOSE SERPL-MCNC: 132 MG/DL — HIGH (ref 70–99)
HBA1C BLD-MCNC: 6.4 % — HIGH (ref 4–5.6)
HCT VFR BLD CALC: 37.4 % — LOW (ref 39–50)
HGB BLD-MCNC: 12.2 G/DL — LOW (ref 13–17)
LACTATE SERPL-SCNC: 1.1 MMOL/L — SIGNIFICANT CHANGE UP (ref 0.5–2)
LIDOCAIN IGE QN: 30 U/L — SIGNIFICANT CHANGE UP (ref 7–60)
MCHC RBC-ENTMCNC: 30.4 PG — SIGNIFICANT CHANGE UP (ref 27–34)
MCHC RBC-ENTMCNC: 32.6 G/DL — SIGNIFICANT CHANGE UP (ref 32–36)
MCV RBC AUTO: 93.3 FL — SIGNIFICANT CHANGE UP (ref 80–100)
PLATELET # BLD AUTO: 216 K/UL — SIGNIFICANT CHANGE UP (ref 150–400)
POTASSIUM SERPL-MCNC: 3.7 MMOL/L — SIGNIFICANT CHANGE UP (ref 3.5–5.3)
POTASSIUM SERPL-SCNC: 3.7 MMOL/L — SIGNIFICANT CHANGE UP (ref 3.5–5.3)
RBC # BLD: 4.01 M/UL — LOW (ref 4.2–5.8)
RBC # FLD: 14.7 % — SIGNIFICANT CHANGE UP (ref 10.3–16.9)
SODIUM SERPL-SCNC: 137 MMOL/L — SIGNIFICANT CHANGE UP (ref 135–145)
WBC # BLD: 5.9 K/UL — SIGNIFICANT CHANGE UP (ref 3.8–10.5)
WBC # FLD AUTO: 5.9 K/UL — SIGNIFICANT CHANGE UP (ref 3.8–10.5)

## 2017-07-01 PROCEDURE — 74000: CPT | Mod: 26

## 2017-07-01 PROCEDURE — 99291 CRITICAL CARE FIRST HOUR: CPT

## 2017-07-01 PROCEDURE — 71010: CPT | Mod: 26

## 2017-07-01 RX ORDER — THIAMINE MONONITRATE (VIT B1) 100 MG
100 TABLET ORAL DAILY
Qty: 0 | Refills: 0 | Status: DISCONTINUED | OUTPATIENT
Start: 2017-07-01 | End: 2017-07-03

## 2017-07-01 RX ORDER — DEXTROSE 50 % IN WATER 50 %
12.5 SYRINGE (ML) INTRAVENOUS ONCE
Qty: 0 | Refills: 0 | Status: DISCONTINUED | OUTPATIENT
Start: 2017-07-01 | End: 2017-07-03

## 2017-07-01 RX ORDER — BENZOCAINE AND MENTHOL 5; 1 G/100ML; G/100ML
1 LIQUID ORAL EVERY 4 HOURS
Qty: 0 | Refills: 0 | Status: DISCONTINUED | OUTPATIENT
Start: 2017-07-01 | End: 2017-07-03

## 2017-07-01 RX ORDER — AMLODIPINE BESYLATE 2.5 MG/1
10 TABLET ORAL DAILY
Qty: 0 | Refills: 0 | Status: DISCONTINUED | OUTPATIENT
Start: 2017-07-01 | End: 2017-07-03

## 2017-07-01 RX ORDER — INSULIN LISPRO 100/ML
VIAL (ML) SUBCUTANEOUS
Qty: 0 | Refills: 0 | Status: DISCONTINUED | OUTPATIENT
Start: 2017-07-01 | End: 2017-07-03

## 2017-07-01 RX ORDER — ATORVASTATIN CALCIUM 80 MG/1
80 TABLET, FILM COATED ORAL AT BEDTIME
Qty: 0 | Refills: 0 | Status: DISCONTINUED | OUTPATIENT
Start: 2017-07-01 | End: 2017-07-03

## 2017-07-01 RX ORDER — LOSARTAN POTASSIUM 100 MG/1
50 TABLET, FILM COATED ORAL DAILY
Qty: 0 | Refills: 0 | Status: DISCONTINUED | OUTPATIENT
Start: 2017-07-01 | End: 2017-07-03

## 2017-07-01 RX ORDER — DEXTROSE 50 % IN WATER 50 %
1 SYRINGE (ML) INTRAVENOUS ONCE
Qty: 0 | Refills: 0 | Status: DISCONTINUED | OUTPATIENT
Start: 2017-07-01 | End: 2017-07-03

## 2017-07-01 RX ORDER — HEPARIN SODIUM 5000 [USP'U]/ML
7500 INJECTION INTRAVENOUS; SUBCUTANEOUS EVERY 8 HOURS
Qty: 0 | Refills: 0 | Status: DISCONTINUED | OUTPATIENT
Start: 2017-07-01 | End: 2017-07-03

## 2017-07-01 RX ORDER — DEXTROSE 50 % IN WATER 50 %
25 SYRINGE (ML) INTRAVENOUS ONCE
Qty: 0 | Refills: 0 | Status: DISCONTINUED | OUTPATIENT
Start: 2017-07-01 | End: 2017-07-03

## 2017-07-01 RX ORDER — BUDESONIDE AND FORMOTEROL FUMARATE DIHYDRATE 160; 4.5 UG/1; UG/1
2 AEROSOL RESPIRATORY (INHALATION)
Qty: 0 | Refills: 0 | Status: DISCONTINUED | OUTPATIENT
Start: 2017-07-01 | End: 2017-07-03

## 2017-07-01 RX ORDER — SODIUM CHLORIDE 9 MG/ML
1000 INJECTION, SOLUTION INTRAVENOUS
Qty: 0 | Refills: 0 | Status: DISCONTINUED | OUTPATIENT
Start: 2017-07-01 | End: 2017-07-01

## 2017-07-01 RX ORDER — METRONIDAZOLE 500 MG
500 TABLET ORAL ONCE
Qty: 0 | Refills: 0 | Status: COMPLETED | OUTPATIENT
Start: 2017-07-01 | End: 2017-07-01

## 2017-07-01 RX ORDER — FOLIC ACID 0.8 MG
1 TABLET ORAL DAILY
Qty: 0 | Refills: 0 | Status: DISCONTINUED | OUTPATIENT
Start: 2017-07-01 | End: 2017-07-03

## 2017-07-01 RX ORDER — SODIUM CHLORIDE 9 MG/ML
1000 INJECTION, SOLUTION INTRAVENOUS
Qty: 0 | Refills: 0 | Status: DISCONTINUED | OUTPATIENT
Start: 2017-07-01 | End: 2017-07-03

## 2017-07-01 RX ORDER — GLUCAGON INJECTION, SOLUTION 0.5 MG/.1ML
1 INJECTION, SOLUTION SUBCUTANEOUS ONCE
Qty: 0 | Refills: 0 | Status: DISCONTINUED | OUTPATIENT
Start: 2017-07-01 | End: 2017-07-03

## 2017-07-01 RX ORDER — IPRATROPIUM/ALBUTEROL SULFATE 18-103MCG
3 AEROSOL WITH ADAPTER (GRAM) INHALATION EVERY 6 HOURS
Qty: 0 | Refills: 0 | Status: DISCONTINUED | OUTPATIENT
Start: 2017-07-01 | End: 2017-07-02

## 2017-07-01 RX ORDER — METRONIDAZOLE 500 MG
TABLET ORAL
Qty: 0 | Refills: 0 | Status: DISCONTINUED | OUTPATIENT
Start: 2017-07-01 | End: 2017-07-02

## 2017-07-01 RX ORDER — MULTIVIT-MIN/FERROUS GLUCONATE 9 MG/15 ML
1 LIQUID (ML) ORAL DAILY
Qty: 0 | Refills: 0 | Status: DISCONTINUED | OUTPATIENT
Start: 2017-07-01 | End: 2017-07-03

## 2017-07-01 RX ORDER — METRONIDAZOLE 500 MG
500 TABLET ORAL EVERY 6 HOURS
Qty: 0 | Refills: 0 | Status: DISCONTINUED | OUTPATIENT
Start: 2017-07-02 | End: 2017-07-02

## 2017-07-01 RX ORDER — PANTOPRAZOLE SODIUM 20 MG/1
40 TABLET, DELAYED RELEASE ORAL DAILY
Qty: 0 | Refills: 0 | Status: DISCONTINUED | OUTPATIENT
Start: 2017-07-01 | End: 2017-07-02

## 2017-07-01 RX ADMIN — PANTOPRAZOLE SODIUM 40 MILLIGRAM(S): 20 TABLET, DELAYED RELEASE ORAL at 13:08

## 2017-07-01 RX ADMIN — HEPARIN SODIUM 7500 UNIT(S): 5000 INJECTION INTRAVENOUS; SUBCUTANEOUS at 15:27

## 2017-07-01 RX ADMIN — BUDESONIDE AND FORMOTEROL FUMARATE DIHYDRATE 2 PUFF(S): 160; 4.5 AEROSOL RESPIRATORY (INHALATION) at 17:58

## 2017-07-01 RX ADMIN — LOSARTAN POTASSIUM 50 MILLIGRAM(S): 100 TABLET, FILM COATED ORAL at 06:18

## 2017-07-01 RX ADMIN — PANTOPRAZOLE SODIUM 40 MILLIGRAM(S): 20 TABLET, DELAYED RELEASE ORAL at 06:17

## 2017-07-01 RX ADMIN — Medication 3 MILLILITER(S): at 19:51

## 2017-07-01 RX ADMIN — AMLODIPINE BESYLATE 10 MILLIGRAM(S): 2.5 TABLET ORAL at 06:18

## 2017-07-01 RX ADMIN — HEPARIN SODIUM 7500 UNIT(S): 5000 INJECTION INTRAVENOUS; SUBCUTANEOUS at 06:18

## 2017-07-01 RX ADMIN — Medication 50 MILLIGRAM(S): at 10:04

## 2017-07-01 RX ADMIN — Medication 100 MILLIGRAM(S): at 15:39

## 2017-07-01 RX ADMIN — Medication 2 MILLIGRAM(S): at 23:49

## 2017-07-01 RX ADMIN — Medication 202 MILLIGRAM(S): at 01:07

## 2017-07-01 RX ADMIN — SODIUM CHLORIDE 125 MILLILITER(S): 9 INJECTION, SOLUTION INTRAVENOUS at 03:51

## 2017-07-01 RX ADMIN — BENZOCAINE AND MENTHOL 1 LOZENGE: 5; 1 LIQUID ORAL at 13:09

## 2017-07-01 RX ADMIN — Medication 2: at 16:53

## 2017-07-01 RX ADMIN — Medication 50 MILLIGRAM(S): at 17:57

## 2017-07-01 RX ADMIN — SODIUM CHLORIDE 125 MILLILITER(S): 9 INJECTION, SOLUTION INTRAVENOUS at 06:23

## 2017-07-01 NOTE — H&P ADULT - HISTORY OF PRESENT ILLNESS
57yo M with heavy alcohol abuse of 5 pints of vodka daily and history of seizures, HTN, DM, neuropathy, R eye blindness, recently discharged 6/29 from ICU with alcohol intoxication/ withdrawal requiring intubation, presented with alcohol intoxication and withdrawal. Patient reports drinking 6 pints of vodka today. Denies vomiting, but is complaining of nausea and pain in his abdomen and chest. Patient is lethargic and not cooperative with any further history.     In the ED, VS: 99.1 132/59 138 [id number] RA  Given Librium 50mg, Ativan 1mg IV x2, NS 3L bolus and banana bag

## 2017-07-01 NOTE — CONSULT NOTE ADULT - SUBJECTIVE AND OBJECTIVE BOX
57yo M with heavy alcohol abuse of 5 pints of vodka daily, HTN, DM, neuropathy, R eye blindness    \ year old male, PMH alcohol abuse for 42 years, recently 4-5 pints daily, HTN, DMII, neuropathy, right eye blindness, transferred from [facility] after he was found by EMS on the street altered and smelling of alcohol. Patient was intubated at [facility] for airway protection and transferred to [facility].  Upon arrival to [facility], patient remained intubated briefly but then extubated successfully as he was awake and alert. He states that he does not remember going to [facility] or the transport to [facility].  He has chronic LE pain that is currently active. He has a history of medicine non-adherence. In the hospital he says he usually receives percocet for pain, and when out of the hospital he drinks to stop the pain.  He endorses symptoms of alcohol withdrawal in the past endorsing headache and "shakiness" which goes away after alcohol consumption.  He states that his most recent hospitalization was 2 days prior at [facility] which resulted from a day of drinking 4-5 pints of alcohol, but states that he left the hospital after 3-4 hours.  Night prior to arrival in ED, patient endorses drinking 4-5 pints of alcohol.Patient was intubated at [facility], brought to [facility] ICU and extubated rather quickly. He was treated with ativan for alcohol withdrawal and currently has a CIWA of 0. Patient is stable for discharge. 57yo M with heavy alcohol abuse of 5 pints of vodka daily, HTN, DM, neuropathy, R eye blindness, recently discharged 6/29 from ICU with alcohol intoxication/ withdrawal requiring intubation, presented with alcohol intoxication and withdrawal. Patient reports drinking 6 pints of vodka today. Denies vomiting, but is complaining of nausea and pain in his abdomen and chest. Patient is lethargic and not cooperative with any further history.     In the ED, VS: 99.1 132/59 138 18 [id number] RA  Given Librium 50mg, Ativan 1mg IV x2, NS 3L bolus and banana bag    OBJECTIVE:    VITAL SIGNS:  ICU Vital Signs Last 24 Hrs  T(C): 37.2 (01 Jul 2017 02:40), Max: 37.5 (01 Jul 2017 00:48)  T(F): 99 (01 Jul 2017 02:40), Max: 99.5 (01 Jul 2017 00:48)  HR: 103 (01 Jul 2017 03:14) (103 - 138)  BP: 155/98 (01 Jul 2017 03:14) (128/89 - 167/106)  BP(mean): --  ABP: --  ABP(mean): --  RR: 20 (01 Jul 2017 03:14) (18 - 20)  SpO2: 100% (01 Jul 2017 03:14) (94% - 100%)        CAPILLARY BLOOD GLUCOSE  213 (29 Jun 2017 11:00)          PHYSICAL EXAM:    General: lethargic, NAD, obese, laying in bed  HEENT: NC/AT; PERRL in L eye, R eye is false, erythematous conjunctiva, tongue fasciculations  Neck: supple  Respiratory: CTA b/l  Cardiovascular: +S1/S2; tachycardic, regular rate, no murmues  Abdomen: soft, NT/ND; +BS x4  Extremities: WWP, 2+ peripheral pulses b/l; no LE edema, bilateral hand tremor on extension  Skin: normal color and turgor; no rash  Neurological: AAOx1, lethargic but easily arousable to voice, moves all extremities spontaneously    MEDICATIONS:  MEDICATIONS  (STANDING):  multivitamin/thiamine/folic acid in sodium chloride 0.9% 1000 milliLiter(s) (125 mL/Hr) IV Continuous <Continuous>  folic acid 1 milliGRAM(s) Oral daily  thiamine 100 milliGRAM(s) Oral daily    MEDICATIONS  (PRN):  LORazepam   Injectable 1 milliGRAM(s) IV Push every 4 hours PRN Agitation      ALLERGIES:  Allergies    No Known Allergies    Intolerances        LABS:                        13.1   4.7   )-----------( 286      ( 30 Jun 2017 20:35 )             39.3     06-30    142  |  98  |  11  ----------------------------<  142<H>  4.2   |  28  |  0.80    Ca    9.2      30 Jun 2017 20:35  Phos  3.0     06-29  Mg     1.9     06-29    TPro  7.7  /  Alb  3.9  /  TBili  0.2  /  DBili  x   /  AST  53<H>  /  ALT  33  /  AlkPhos  78  06-30    RADIOLOGY & ADDITIONAL TESTS: Reviewed.    ASSESSMENT: 57yo M with heavy alcohol abuse of 5 pints of vodka daily, HTN, DM, neuropathy, R eye blindness, recently discharged 6/29 from ICU with alcohol intoxication/ withdrawal requiring intubation, presented with alcohol intoxication and withdrawal.     PLAN:    #NEURO - Toxic metabolic encephalopathy 2/2 alcohol intoxication. Patient is lethargic, but easily arousable and protecting airway. Showing signs of alcohol withdrawal. CIWA 20.   -CIWA Q2h   -Ativan 1mg IV Q4h PRN  -c/w Banana bag, thiamine, folic acid, MVI    #CARDIO - tachycardic 2/2 etoh withdrawal. EKG with sinus tachy.    #PULM - maintain O2 sat >94%    #ENDOCRINE - insulin sliding scale, check finger sticks and adjust, goals -1810    FEN - banana bag; replete lytes prn; NPO    PPx - HSQ, SCDs, Protonix    CODE - FULL.    DISPO - MICU as patient is high risk for DTs/seizures.

## 2017-07-01 NOTE — PROGRESS NOTE ADULT - SUBJECTIVE AND OBJECTIVE BOX
PGY2 mICU to [facility] Transfer Note  Hospital Course  56M w/ heavy alcohol use (5 pints/day) and EtOH induced seizures, HTN, DM, neuropathy, R. eye blindness, recently discharge from mICU on 6/29 for acute alcohol intoxication, re-admitted overnight for alcohol intoxication after drinking 6 pints of vodka post-discharge. He denied emesis but reported nausea, abdominal, and chest pain.  In the ED, vital signs were /59  RR 18 T 99.1, and patient was given Librium 50mg, Ativan 1mg IV x2, NS 3L bolus and a banana bag, and admitted to mICU due to high risk of DTs/seizures.  Upon admission the patient was noted to have positive blood cultures w/ bacteroides from previous admission.  He was started on flagyl and transferred to the [facility] for continued care.    Subjective/ROS: Endorses chronic pains in stomach and legs.  Repeatedly mentions drinking.  Endorses cough.  Denies n/v, BM changes, urinary changes.  12pt ROS otherwise negative.    VITALS  Vital Signs Last 24 Hrs  T(C): 36.6 (01 Jul 2017 12:56), Max: 37.5 (01 Jul 2017 00:48)  T(F): 97.9 (01 Jul 2017 12:56), Max: 99.5 (01 Jul 2017 00:48)  HR: 100 (01 Jul 2017 16:00) (90 - 138)  BP: 140/90 (01 Jul 2017 16:00) (128/89 - 182/89)  BP(mean): 107 (01 Jul 2017 16:00) (94 - 120)  RR: 12 (01 Jul 2017 16:00) (12 - 25)  SpO2: 92% (01 Jul 2017 16:00) (92% - 100%)    CAPILLARY BLOOD GLUCOSE  137 (01 Jul 2017 06:00)    PHYSICAL EXAM  General: A&Ox2-3; NAD; diaphoretic; obese  Head: NC/AT; MMM; PERRL; EOMI; tongue tremor  Respiratory: Decreased breath sounds on R base, otherwise clear to auscultation   Cardiovascular: Regular rhythm/tachycardic; S1/S2   Gastrointestinal: Obese, non-tender, BS normal  Extremities: WWP; 1+ LE edema B/L; pulses palpable; tremor  Neurological:  CNII-XII grossly intact; no obvious focal deficits  CIWA: >8    MEDICATIONS  (STANDING):  folic acid 1 milliGRAM(s) Oral daily  thiamine 100 milliGRAM(s) Oral daily  pantoprazole  Injectable 40 milliGRAM(s) IV Push daily  multivitamin/minerals 1 Tablet(s) Oral daily  heparin  Injectable 7500 Unit(s) SubCutaneous every 8 hours  losartan 50 milliGRAM(s) Oral daily  atorvastatin 80 milliGRAM(s) Oral at bedtime  buDESOnide 160 MICROgram(s)/formoterol 4.5 MICROgram(s) Inhaler 2 Puff(s) Inhalation two times a day  amLODIPine   Tablet 10 milliGRAM(s) Oral daily  insulin lispro (HumaLOG) corrective regimen sliding scale   SubCutaneous three times a day before meals  dextrose 5%. 1000 milliLiter(s) (50 mL/Hr) IV Continuous <Continuous>  dextrose 50% Injectable 12.5 Gram(s) IV Push once  dextrose 50% Injectable 25 Gram(s) IV Push once  dextrose 50% Injectable 25 Gram(s) IV Push once  chlordiazePOXIDE 50 milliGRAM(s) Oral every 8 hours  metroNIDAZOLE  IVPB   IV Intermittent     MEDICATIONS  (PRN):  LORazepam   Injectable 1 milliGRAM(s) IV Push every 4 hours PRN Agitation  dextrose Gel 1 Dose(s) Oral once PRN Blood Glucose LESS THAN 70 milliGRAM(s)/deciliter  glucagon  Injectable 1 milliGRAM(s) IntraMuscular once PRN Glucose LESS THAN 70 milligrams/deciliter  benzocaine 15 mG/menthol 3.6 mG Lozenge 1 Lozenge Oral every 4 hours PRN Sore Throat    No Known Allergies    LABS                        13.1   4.7   )-----------( 286      ( 30 Jun 2017 20:35 )             39.3     06-30    142  |  98  |  11  ----------------------------<  142<H>  4.2   |  28  |  0.80    Ca    9.2      30 Jun 2017 20:35    TPro  7.7  /  Alb  3.9  /  TBili  0.2  /  DBili  x   /  AST  53<H>  /  ALT  33  /  AlkPhos  78  06-30    CARDIAC MARKERS ( 30 Jun 2017 20:35 )  x     / <0.01 ng/mL / x     / x     / x          IMAGING/EKG/ETC  EKG: < from: 12 Lead ECG (06.29.17 @ 09:34) >  Diagnosis Line Sinus rhythm with 1st degree AV block  Possible Left atrial enlargement  Possible Anterior infarct , age undetermined    < end of copied text >    Xray: < from: Xray Chest 1 View AP- PORTABLE-Urgent (07.01.17 @ 01:49) >  Impression: No acute infiltrates    < end of copied text >    CT Abd/Pelvis: Pending

## 2017-07-01 NOTE — H&P ADULT - NSHPLABSRESULTS_GEN_ALL_CORE
13.1   4.7   )-----------( 286      ( 30 Jun 2017 20:35 )             39.3     06-30    142  |  98  |  11  ----------------------------<  142<H>  4.2   |  28  |  0.80    Ca    9.2      30 Jun 2017 20:35  Phos  3.0     06-29  Mg     1.9     06-29    TPro  7.7  /  Alb  3.9  /  TBili  0.2  /  DBili  x   /  AST  53<H>  /  ALT  33  /  AlkPhos  78  06-30        CARDIAC MARKERS ( 30 Jun 2017 20:35 )  x     / <0.01 ng/mL / x     / x     / x                RADIOLOGY, EKG & ADDITIONAL TESTS: Reviewed.

## 2017-07-01 NOTE — PROGRESS NOTE ADULT - PROBLEM SELECTOR PLAN 6
PPx: HSQ; protonix  FEN: banana bag; replete electrolytes prn; NPO  Myers: No  Access: Peripheral  Dispo: C/w care on RMF

## 2017-07-01 NOTE — PROGRESS NOTE ADULT - PROBLEM SELECTOR PLAN 2
BCx(+) for B. fragilis, resulted post-discharge.  No obvious source of bacteremia.  - C/w IV Flagyl  - CT Abd/pelvis for source hunt  - F/u surv. cultures

## 2017-07-01 NOTE — CONSULT NOTE ADULT - ATTENDING COMMENTS
This patient was evaluated with the resident and management decisions were made,  See the above for details, I agree with the A/P  -alcohol abuse  -alcohol withdrawal

## 2017-07-01 NOTE — PROGRESS NOTE ADULT - SUBJECTIVE AND OBJECTIVE BOX
Transfer Note:  Hospital Course:   55 YO M w/ hx of heavy alcohol use (5 pints/day) and seizures, HTN, DM, neuropathy, R. eye blindness, recently discharge from ICU on 6/29, admitted overnight for alcohol intoxication and withdrawal after drinking 6 pints of vodka. Denied emesis but reported nausea, abdominal, and chest pain. In the ED, vital signs were /59  RR 18 T 99.1, and patient was given Librium 50mg, Ativan 1mg IV x2, NS 3L bolus and a banana bag, and admitted to MICU due to high risk of DTs/seizures. In MICU, patient continued on banana bag, thiamine, folic acid, MVI. Transfer  today.      INTERVAL HPI/OVERNIGHT EVENTS:  No acute overnight events.    SUBJECTIVE: Patient seen and examined at bedside.     OBJECTIVE:    VITAL SIGNS:  ICU Vital Signs Last 24 Hrs  T(C): 36.6 (01 Jul 2017 12:56), Max: 37.5 (01 Jul 2017 00:48)  T(F): 97.9 (01 Jul 2017 12:56), Max: 99.5 (01 Jul 2017 00:48)  HR: 92 (01 Jul 2017 13:00) (90 - 138)  BP: 169/80 (01 Jul 2017 13:00) (128/89 - 182/89)  BP(mean): 108 (01 Jul 2017 13:00) (94 - 120)  ABP: --  ABP(mean): --  RR: 17 (01 Jul 2017 13:00) (14 - 25)  SpO2: 96% (01 Jul 2017 13:00) (94% - 100%)        06-30 @ 07:01 - 07-01 @ 07:00  --------------------------------------------------------  IN: 250 mL / OUT: 0 mL / NET: 250 mL    07-01 @ 07:01 - 07-01 @ 14:07  --------------------------------------------------------  IN: 1045 mL / OUT: 1250 mL / NET: -205 mL      CAPILLARY BLOOD GLUCOSE  137 (01 Jul 2017 06:00)          PHYSICAL EXAM:    General: Alert, sitting in chair  HEENT: PERRL in L eye, R eye is false, erythematous conjunctiva, tongue fasciculations  Neck: supple  Respiratory: CTAB  Cardiovascular: +S1/S2; tachycardic, regular rhythm, no murmurs   Abdomen: soft, NT/ND; +BS x4  Extremities: WWP, 2+ peripheral pulses b/l; no LE edema, bilateral hand tremor on extension  Skin: normal color and turgor; no rash  Neurological: AAOx1, lethargic but easily arousable to voice, moves all extremities spontaneously    MEDICATIONS:  MEDICATIONS  (STANDING):  multivitamin/thiamine/folic acid in sodium chloride 0.9% 1000 milliLiter(s) (125 mL/Hr) IV Continuous <Continuous>  folic acid 1 milliGRAM(s) Oral daily  thiamine 100 milliGRAM(s) Oral daily  pantoprazole  Injectable 40 milliGRAM(s) IV Push daily  multivitamin/minerals 1 Tablet(s) Oral daily  heparin  Injectable 7500 Unit(s) SubCutaneous every 8 hours  losartan 50 milliGRAM(s) Oral daily  atorvastatin 80 milliGRAM(s) Oral at bedtime  buDESOnide 160 MICROgram(s)/formoterol 4.5 MICROgram(s) Inhaler 2 Puff(s) Inhalation two times a day  amLODIPine   Tablet 10 milliGRAM(s) Oral daily  insulin lispro (HumaLOG) corrective regimen sliding scale   SubCutaneous three times a day before meals  dextrose 5%. 1000 milliLiter(s) (50 mL/Hr) IV Continuous <Continuous>  dextrose 50% Injectable 12.5 Gram(s) IV Push once  dextrose 50% Injectable 25 Gram(s) IV Push once  dextrose 50% Injectable 25 Gram(s) IV Push once  chlordiazePOXIDE 50 milliGRAM(s) Oral every 8 hours    MEDICATIONS  (PRN):  LORazepam   Injectable 1 milliGRAM(s) IV Push every 4 hours PRN Agitation  dextrose Gel 1 Dose(s) Oral once PRN Blood Glucose LESS THAN 70 milliGRAM(s)/deciliter  glucagon  Injectable 1 milliGRAM(s) IntraMuscular once PRN Glucose LESS THAN 70 milligrams/deciliter  benzocaine 15 mG/menthol 3.6 mG Lozenge 1 Lozenge Oral every 4 hours PRN Sore Throat      ALLERGIES:  Allergies    No Known Allergies    Intolerances        LABS:                        13.1   4.7   )-----------( 286      ( 30 Jun 2017 20:35 )             39.3     06-30    142  |  98  |  11  ----------------------------<  142<H>  4.2   |  28  |  0.80    Ca    9.2      30 Jun 2017 20:35    TPro  7.7  /  Alb  3.9  /  TBili  0.2  /  DBili  x   /  AST  53<H>  /  ALT  33  /  AlkPhos  78  06-30          RADIOLOGY & ADDITIONAL TESTS:   INTERVAL HPI/OVERNIGHT EVENTS:    SUBJECTIVE: Patient seen and examined at bedside.     ROS:  CV: Denies chest pain  Resp: Denies SOB  GI: Denies abdominal pain, constipation, diarrhea, nausea, vomiting  : Denies dysuria  ID: Denies fevers, chills  MSK: Denies joint pain     OBJECTIVE:    VITAL SIGNS:  ICU Vital Signs Last 24 Hrs  T(C): 36.6 (01 Jul 2017 12:56), Max: 37.5 (01 Jul 2017 00:48)  T(F): 97.9 (01 Jul 2017 12:56), Max: 99.5 (01 Jul 2017 00:48)  HR: 92 (01 Jul 2017 13:00) (90 - 138)  BP: 169/80 (01 Jul 2017 13:00) (128/89 - 182/89)  BP(mean): 108 (01 Jul 2017 13:00) (94 - 120)  ABP: --  ABP(mean): --  RR: 17 (01 Jul 2017 13:00) (14 - 25)  SpO2: 96% (01 Jul 2017 13:00) (94% - 100%)        06-30 @ 07:01  -  07-01 @ 07:00  --------------------------------------------------------  IN: 250 mL / OUT: 0 mL / NET: 250 mL    07-01 @ 07:01 - 07-01 @ 14:07  --------------------------------------------------------  IN: 1045 mL / OUT: 1250 mL / NET: -205 mL      CAPILLARY BLOOD GLUCOSE  137 (01 Jul 2017 06:00)          PHYSICAL EXAM:    General: NAD  HEENT: NC/AT; PERRL, clear conjunctiva  Neck: supple  Respiratory: CTAB  Cardiovascular: +S1/S2; RRR  Abdomen: soft, NT/ND; +BS x4  Extremities: WWP, 2+ peripheral pulses b/l; no LE edema  Skin: normal color and turgor; no rash  Neurological:     MEDICATIONS:  MEDICATIONS  (STANDING):  multivitamin/thiamine/folic acid in sodium chloride 0.9% 1000 milliLiter(s) (125 mL/Hr) IV Continuous <Continuous>  folic acid 1 milliGRAM(s) Oral daily  thiamine 100 milliGRAM(s) Oral daily  pantoprazole  Injectable 40 milliGRAM(s) IV Push daily  multivitamin/minerals 1 Tablet(s) Oral daily  heparin  Injectable 7500 Unit(s) SubCutaneous every 8 hours  losartan 50 milliGRAM(s) Oral daily  atorvastatin 80 milliGRAM(s) Oral at bedtime  buDESOnide 160 MICROgram(s)/formoterol 4.5 MICROgram(s) Inhaler 2 Puff(s) Inhalation two times a day  amLODIPine   Tablet 10 milliGRAM(s) Oral daily  insulin lispro (HumaLOG) corrective regimen sliding scale   SubCutaneous three times a day before meals  dextrose 5%. 1000 milliLiter(s) (50 mL/Hr) IV Continuous <Continuous>  dextrose 50% Injectable 12.5 Gram(s) IV Push once  dextrose 50% Injectable 25 Gram(s) IV Push once  dextrose 50% Injectable 25 Gram(s) IV Push once  chlordiazePOXIDE 50 milliGRAM(s) Oral every 8 hours    MEDICATIONS  (PRN):  LORazepam   Injectable 1 milliGRAM(s) IV Push every 4 hours PRN Agitation  dextrose Gel 1 Dose(s) Oral once PRN Blood Glucose LESS THAN 70 milliGRAM(s)/deciliter  glucagon  Injectable 1 milliGRAM(s) IntraMuscular once PRN Glucose LESS THAN 70 milligrams/deciliter  benzocaine 15 mG/menthol 3.6 mG Lozenge 1 Lozenge Oral every 4 hours PRN Sore Throat      ALLERGIES:  Allergies    No Known Allergies    Intolerances        LABS:                        13.1   4.7   )-----------( 286      ( 30 Jun 2017 20:35 )             39.3     06-30    142  |  98  |  11  ----------------------------<  142<H>  4.2   |  28  |  0.80    Ca    9.2      30 Jun 2017 20:35    TPro  7.7  /  Alb  3.9  /  TBili  0.2  /  DBili  x   /  AST  53<H>  /  ALT  33  /  AlkPhos  78  06-30          RADIOLOGY & ADDITIONAL TESTS:     EXAM:  XR CHEST 1 VIEW PORT URGENT                          PROCEDURE DATE:  07/01/2017           INTERPRETATION:  Clinical History: Tachycardia    Portable examination of the chest demonstrates cardiomegaly. No acute   infiltrates. Degenerative changes thoracic spine. Hardware overlying   cervical spine.    Impression: No acute infiltrates      JOELLE MOORE M.D., ATTENDING RADIOLOGIST  This document has been electronically signed. Jul 1 2017 10:01AM      ASSESSMENT/PLAN:   57yo M with heavy alcohol abuse of 5 pints of vodka daily, HTN, DM, neuropathy, R eye blindness, recently discharged 6/29 from ICU with alcohol intoxication/ withdrawal requiring intubation, presented with alcohol intoxication and withdrawal.     #NEURO - Toxic metabolic encephalopathy 2/2 alcohol intoxication. Patient lethargic, but easily arousable and protecting airway. Showing signs of alcohol withdrawal. CIWA 20.   -CIWA Q2h   -Ativan 1mg IV Q4h PRN  -c/w Banana bag, thiamine, folic acid, MVI    #CARDIO - tachycardic 2/2 etoh withdrawal. EKG with sinus tachy.  HTN - c/w Losartan and Norvasc  HLD - c/w Lipitor    #PULM - maintain O2 sat >94%    #ENDOCRINE   DM - holding home insulin  -insulin sliding scale, check finger sticks and adjust, goals -1810    FEN - banana bag; replete lytes prn; NPO    PPx - HSQ, SCDs, Protonix    CODE - FULL.    DISPO - Transfer to floors Transfer Note:  Hospital Course:   55 YO M w/ hx of heavy alcohol use (5 pints/day) and seizures, HTN, DM, neuropathy, R. eye blindness, recently discharge from ICU on 6/29, admitted overnight for alcohol intoxication and withdrawal after drinking 6 pints of vodka. Denied emesis but reported nausea, abdominal, and chest pain. In the ED, vital signs were /59  RR 18 T 99.1, and patient was given Librium 50mg, Ativan 1mg IV x2, NS 3L bolus and a banana bag, and admitted to MICU due to high risk of DTs/seizures. In MICU, patient continued on banana bag, thiamine, folic acid, MVI. Transfer  today.      INTERVAL HPI/OVERNIGHT EVENTS:  No acute overnight events.    SUBJECTIVE: Patient seen and examined at bedside.     OBJECTIVE:    VITAL SIGNS:  ICU Vital Signs Last 24 Hrs  T(C): 36.6 (01 Jul 2017 12:56), Max: 37.5 (01 Jul 2017 00:48)  T(F): 97.9 (01 Jul 2017 12:56), Max: 99.5 (01 Jul 2017 00:48)  HR: 92 (01 Jul 2017 13:00) (90 - 138)  BP: 169/80 (01 Jul 2017 13:00) (128/89 - 182/89)  BP(mean): 108 (01 Jul 2017 13:00) (94 - 120)  ABP: --  ABP(mean): --  RR: 17 (01 Jul 2017 13:00) (14 - 25)  SpO2: 96% (01 Jul 2017 13:00) (94% - 100%)        06-30 @ 07:01 - 07-01 @ 07:00  --------------------------------------------------------  IN: 250 mL / OUT: 0 mL / NET: 250 mL    07-01 @ 07:01 - 07-01 @ 14:07  --------------------------------------------------------  IN: 1045 mL / OUT: 1250 mL / NET: -205 mL      CAPILLARY BLOOD GLUCOSE  137 (01 Jul 2017 06:00)      PHYSICAL EXAM:    General: Lethargic, laying in bed.  HEENT: PERRL in L eye, R eye is false, erythematous conjunctiva, tongue fasciculations  Neck: supple  Respiratory: CTAB  Cardiovascular: +S1/S2; tachycardic, regular rhythm, no murmurs   Abdomen: soft, NT/ND; +BS x4  Extremities: WWP, 2+ peripheral pulses b/l; no LE edema, bilateral hand tremor on extension  Skin: normal color and turgor; no rash  Neurological: AAOx1, lethargic but easily arousable to voice, moves all extremities spontaneously    MEDICATIONS:  MEDICATIONS  (STANDING):  multivitamin/thiamine/folic acid in sodium chloride 0.9% 1000 milliLiter(s) (125 mL/Hr) IV Continuous <Continuous>  folic acid 1 milliGRAM(s) Oral daily  thiamine 100 milliGRAM(s) Oral daily  pantoprazole  Injectable 40 milliGRAM(s) IV Push daily  multivitamin/minerals 1 Tablet(s) Oral daily  heparin  Injectable 7500 Unit(s) SubCutaneous every 8 hours  losartan 50 milliGRAM(s) Oral daily  atorvastatin 80 milliGRAM(s) Oral at bedtime  buDESOnide 160 MICROgram(s)/formoterol 4.5 MICROgram(s) Inhaler 2 Puff(s) Inhalation two times a day  amLODIPine   Tablet 10 milliGRAM(s) Oral daily  insulin lispro (HumaLOG) corrective regimen sliding scale   SubCutaneous three times a day before meals  dextrose 5%. 1000 milliLiter(s) (50 mL/Hr) IV Continuous <Continuous>  dextrose 50% Injectable 12.5 Gram(s) IV Push once  dextrose 50% Injectable 25 Gram(s) IV Push once  dextrose 50% Injectable 25 Gram(s) IV Push once  chlordiazePOXIDE 50 milliGRAM(s) Oral every 8 hours    MEDICATIONS  (PRN):  LORazepam   Injectable 1 milliGRAM(s) IV Push every 4 hours PRN Agitation  dextrose Gel 1 Dose(s) Oral once PRN Blood Glucose LESS THAN 70 milliGRAM(s)/deciliter  glucagon  Injectable 1 milliGRAM(s) IntraMuscular once PRN Glucose LESS THAN 70 milligrams/deciliter  benzocaine 15 mG/menthol 3.6 mG Lozenge 1 Lozenge Oral every 4 hours PRN Sore Throat      ALLERGIES:  Allergies    No Known Allergies    Intolerances        LABS:                        13.1   4.7   )-----------( 286      ( 30 Jun 2017 20:35 )             39.3     06-30    142  |  98  |  11  ----------------------------<  142<H>  4.2   |  28  |  0.80    Ca    9.2      30 Jun 2017 20:35    TPro  7.7  /  Alb  3.9  /  TBili  0.2  /  DBili  x   /  AST  53<H>  /  ALT  33  /  AlkPhos  78  06-30        RADIOLOGY & ADDITIONAL TESTS:   RADIOLOGY & ADDITIONAL TESTS:     EXAM:  XR CHEST 1 VIEW PORT URGENT                          PROCEDURE DATE:  07/01/2017           INTERPRETATION:  Clinical History: Tachycardia    Portable examination of the chest demonstrates cardiomegaly. No acute   infiltrates. Degenerative changes thoracic spine. Hardware overlying   cervical spine.    Impression: No acute infiltrates      JOELLE MOORE M.D., ATTENDING RADIOLOGIST  This document has been electronically signed. Jul 1 2017 10:01AM      ASSESSMENT/PLAN:   55yo M with heavy alcohol abuse of 5 pints of vodka daily, HTN, DM, neuropathy, R eye blindness, recently discharged 6/29 from ICU with alcohol intoxication/ withdrawal requiring intubation, presented with alcohol intoxication and withdrawal.     #NEURO - Toxic metabolic encephalopathy 2/2 alcohol intoxication. Patient lethargic, but easily arousable and protecting airway. Showing signs of alcohol withdrawal. CIWA 20.   -CIWA Q2h   -Ativan 1mg IV Q4h PRN  -c/w Banana bag, thiamine, folic acid, MVI    #CARDIO - tachycardic 2/2 etoh withdrawal. EKG with sinus tachy.  HTN - c/w Losartan and Norvasc  HLD - c/w Lipitor    #PULM - maintain O2 sat >94%    #ENDOCRINE   DM - holding home insulin  -insulin sliding scale, check finger sticks and adjust, goals -1810    FEN - banana bag; replete lytes prn; NPO    PPx - HSQ, SCDs, Protonix    CODE - FULL.    DISPO - Transfer to floors Transfer Note:  Hospital Course:   55 YO M w/ hx of heavy alcohol use (5 pints/day) and seizures, HTN, DM, neuropathy, R. eye blindness, recently discharge from ICU on 6/29, admitted overnight for alcohol intoxication and withdrawal after drinking 6 pints of vodka. Denied emesis but reported nausea, abdominal, and chest pain. In the ED, vital signs were /59  RR 18 T 99.1, and patient was given Librium 50mg, Ativan 1mg IV x2, NS 3L bolus and a banana bag, and admitted to MICU due to high risk of DTs/seizures. In MICU, patient continued on banana bag, thiamine, folic acid, MVI. Transfer  today.      INTERVAL HPI/OVERNIGHT EVENTS:  No acute overnight events.    SUBJECTIVE: Patient seen and examined at bedside.     OBJECTIVE:    VITAL SIGNS:  ICU Vital Signs Last 24 Hrs  T(C): 36.6 (01 Jul 2017 12:56), Max: 37.5 (01 Jul 2017 00:48)  T(F): 97.9 (01 Jul 2017 12:56), Max: 99.5 (01 Jul 2017 00:48)  HR: 92 (01 Jul 2017 13:00) (90 - 138)  BP: 169/80 (01 Jul 2017 13:00) (128/89 - 182/89)  BP(mean): 108 (01 Jul 2017 13:00) (94 - 120)  ABP: --  ABP(mean): --  RR: 17 (01 Jul 2017 13:00) (14 - 25)  SpO2: 96% (01 Jul 2017 13:00) (94% - 100%)        06-30 @ 07:01 - 07-01 @ 07:00  --------------------------------------------------------  IN: 250 mL / OUT: 0 mL / NET: 250 mL    07-01 @ 07:01 - 07-01 @ 14:07  --------------------------------------------------------  IN: 1045 mL / OUT: 1250 mL / NET: -205 mL      CAPILLARY BLOOD GLUCOSE  137 (01 Jul 2017 06:00)      PHYSICAL EXAM:    General: Lethargic, laying in bed.  HEENT: PERRL in L eye, R eye is false, erythematous conjunctiva, tongue fasciculations  Neck: supple  Respiratory: CTAB  Cardiovascular: +S1/S2; tachycardic, regular rhythm, no murmurs   Abdomen: soft, NT/ND; +BS x4  Extremities/Vasc: WWP, 2+ peripheral pulses b/l; no LE edema, bilateral hand tremor on extension  MSK:no joint swelling  Skin: normal color and turgor; no rash  Neurological: AAOx1, lethargic but easily arousable to voice, moves all extremities spontaneously    MEDICATIONS:  MEDICATIONS  (STANDING):  multivitamin/thiamine/folic acid in sodium chloride 0.9% 1000 milliLiter(s) (125 mL/Hr) IV Continuous <Continuous>  folic acid 1 milliGRAM(s) Oral daily  thiamine 100 milliGRAM(s) Oral daily  pantoprazole  Injectable 40 milliGRAM(s) IV Push daily  multivitamin/minerals 1 Tablet(s) Oral daily  heparin  Injectable 7500 Unit(s) SubCutaneous every 8 hours  losartan 50 milliGRAM(s) Oral daily  atorvastatin 80 milliGRAM(s) Oral at bedtime  buDESOnide 160 MICROgram(s)/formoterol 4.5 MICROgram(s) Inhaler 2 Puff(s) Inhalation two times a day  amLODIPine   Tablet 10 milliGRAM(s) Oral daily  insulin lispro (HumaLOG) corrective regimen sliding scale   SubCutaneous three times a day before meals  dextrose 5%. 1000 milliLiter(s) (50 mL/Hr) IV Continuous <Continuous>  dextrose 50% Injectable 12.5 Gram(s) IV Push once  dextrose 50% Injectable 25 Gram(s) IV Push once  dextrose 50% Injectable 25 Gram(s) IV Push once  chlordiazePOXIDE 50 milliGRAM(s) Oral every 8 hours    MEDICATIONS  (PRN):  LORazepam   Injectable 1 milliGRAM(s) IV Push every 4 hours PRN Agitation  dextrose Gel 1 Dose(s) Oral once PRN Blood Glucose LESS THAN 70 milliGRAM(s)/deciliter  glucagon  Injectable 1 milliGRAM(s) IntraMuscular once PRN Glucose LESS THAN 70 milligrams/deciliter  benzocaine 15 mG/menthol 3.6 mG Lozenge 1 Lozenge Oral every 4 hours PRN Sore Throat      ALLERGIES:  Allergies    No Known Allergies    Intolerances        LABS:                        13.1   4.7   )-----------( 286      ( 30 Jun 2017 20:35 )             39.3     06-30    142  |  98  |  11  ----------------------------<  142<H>  4.2   |  28  |  0.80    Ca    9.2      30 Jun 2017 20:35    TPro  7.7  /  Alb  3.9  /  TBili  0.2  /  DBili  x   /  AST  53<H>  /  ALT  33  /  AlkPhos  78  06-30        RADIOLOGY & ADDITIONAL TESTS:   RADIOLOGY & ADDITIONAL TESTS:     EXAM:  XR CHEST 1 VIEW PORT URGENT                          PROCEDURE DATE:  07/01/2017           INTERPRETATION:  Clinical History: Tachycardia    Portable examination of the chest demonstrates cardiomegaly. No acute   infiltrates. Degenerative changes thoracic spine. Hardware overlying   cervical spine.    Impression: No acute infiltrates      JOELLE MOORE M.D., ATTENDING RADIOLOGIST  This document has been electronically signed. Jul 1 2017 10:01AM      ASSESSMENT/PLAN:   55yo M with heavy alcohol abuse of 5 pints of vodka daily, HTN, DM, neuropathy, R eye blindness, recently discharged 6/29 from ICU with alcohol intoxication/ withdrawal requiring intubation, presented with alcohol intoxication and withdrawal.     #NEURO - Toxic metabolic encephalopathy 2/2 alcohol intoxication. Patient lethargic, but easily arousable and protecting airway. Showing signs of alcohol withdrawal. CIWA 20.   -CIWA Q2h   -Ativan 1mg IV Q4h PRN  -c/w Banana bag, thiamine, folic acid, MVI    #CARDIO - tachycardic 2/2 etoh withdrawal. EKG with sinus tachy.    #ID - patient had positive BC for B. fragilis that came back after discharge. To reculture, start IV flagyl and obtain CT of abdomen to look for source.    HTN - c/w Losartan and Norvasc  HLD - c/w Lipitor    #PULM - maintain O2 sat >94%    #ENDOCRINE   DM - holding home insulin  -insulin sliding scale, check finger sticks and adjust, goals -1810    FEN - banana bag; replete lytes prn; NPO    PPx - HSQ, SCDs, Protonix    CODE - FULL.    DISPO - Transfer to floors

## 2017-07-01 NOTE — H&P ADULT - ASSESSMENT
57yo M with heavy alcohol abuse of 5 pints of vodka daily, HTN, DM, neuropathy, R eye blindness, recently discharged 6/29 from ICU with alcohol intoxication/ withdrawal requiring intubation, presented with alcohol intoxication and withdrawal.     #NEURO - Toxic metabolic encephalopathy 2/2 alcohol intoxication. Patient is lethargic, but easily arousable and protecting airway. Showing signs of alcohol withdrawal. CIWA 20.   -CIWA Q2h   -Ativan 1mg IV Q4h PRN  -c/w Banana bag, thiamine, folic acid, MVI    #CARDIO - tachycardic 2/2 etoh withdrawal. EKG with sinus tachy.  HTN - c/w Losartan and Norvasc  HLD - c/w Lipitor    #PULM - maintain O2 sat >94%    #ENDOCRINE   DM - holding home insulin  -insulin sliding scale, check finger sticks and adjust, goals -1810    FEN - banana bag; replete lytes prn; NPO    PPx - HSQ, SCDs, Protonix    CODE - FULL.    DISPO - MICU as patient is high risk for DTs/seizures.

## 2017-07-01 NOTE — PROGRESS NOTE ADULT - ASSESSMENT
56M w/ heavy alcohol use (5 pints/day) and EtOH induced seizures, recently discharge from mICU on 6/29 for acute alcohol intoxication, re-admitted overnight for alcohol intoxication after drinking 6 pints of vodka post-discharge, found to be bacteremic from BCx during previous admission.

## 2017-07-01 NOTE — PROGRESS NOTE ADULT - ATTENDING COMMENTS
pt seen and examined; reviewed vs, labs, above chart note  pt readmitted after recent discharge for ETOH intoxication/ withdrawl, found to +blood ctxs for bacteroides fragiles   pt at bedside reports having anxiety/sweats/ n/v/ diarrhea and abdominal pain. Pt reports having a productive cough as well. Denies hallucinations. Pt reports having  2 BMs today; pt reports swelling of his hands and legs.   PE  gen: awake, alert, appears slightly anxious, obese, +sweating ; on NC  heent: +R ; L pupil reactive  , no photophobia ; +tongue fasciculations   cvs: faint LUSB systolic murmur  lungs: decreased breath sounds at R base w/ scattered rhonchi b/l   abd: distended abdomen w/ high pitched but infrequent bowel sounds; pt reports mild epigastric tenderness on palpation  ext: b/l lower ext edema R>L; +hand tremors  a/p:  1. abdominal pain/distension: abdominal examination findings appears to have changed, CT a/p pending; willl obtain AXR, CBC/CMP/lipase/lactate stat. will most likely consult surgery pending AXR and/or CT a/p findings  2. ETOH withdrawl: c/w librium standing and ativan prn; banana bag dcd earlier by house staff, pt appears fluid overloaded, ok to keep off fluids nows;  will monitor  3. bacteremia: searching for source, CT a/p pending ; given murmur will order ECHO; follow up ctxs  4. lower ext edema: negative dopplers on 6/28  5. DM: plan as above  6. HTN: plan as above pt seen and examined; reviewed vs, labs, above chart note  pt readmitted after recent discharge for ETOH intoxication/ withdrawl, found to +blood ctxs for bacteroides fragiles   pt at bedside reports having anxiety/sweats/ n/v/ diarrhea and abdominal pain. Pt reports having a productive cough as well. Denies hallucinations. Pt reports having  2 BMs today; pt reports swelling of his hands and legs.   PE  gen: awake, alert, appears slightly anxious, obese, +sweating ; on NC  heent: +R eye prosthesis  ; L pupil reactive  , no photophobia ; +tongue fasciculations , MMM  cvs: faint LUSB systolic murmur  lungs: decreased breath sounds at R base w/ scattered rhonchi b/l   abd: distended abdomen w/ high pitched but infrequent bowel sounds; pt reports mild epigastric tenderness on palpation  ext: b/l lower ext edema R>L; +hand tremors  a/p:  1. abdominal pain/distension: abdominal examination findings appears to have changed, CT a/p pending; willl obtain AXR, CBC/CMP/lipase/lactate stat. will most likely consult surgery pending AXR and/or CT a/p findings  2. ETOH withdrawl: c/w librium standing and ativan prn; banana bag dcd earlier by house staff, pt appears fluid overloaded, ok to keep off fluids nows;  will monitor  3. bacteremia: searching for source, CT a/p pending ; given murmur will order ECHO; follow up ctxs  4. lower ext edema: negative dopplers on 6/28  5. DM: plan as above  6. HTN: plan as above

## 2017-07-01 NOTE — H&P ADULT - NSHPPHYSICALEXAM_GEN_ALL_CORE
General: lethargic, NAD, obese, laying in bed  HEENT: NC/AT; PERRL in L eye, R eye is false, erythematous conjunctiva, tongue fasciculations  Neck: supple  Respiratory: CTA b/l  Cardiovascular: +S1/S2; tachycardic, regular rate, no murmues  Abdomen: soft, NT/ND; +BS x4  Extremities: WWP, 2+ peripheral pulses b/l; no LE edema, bilateral hand tremor on extension  Skin: normal color and turgor; no rash  Neurological: AAOx1, lethargic but easily arousable to voice, moves all extremities spontaneously General: lethargic, NAD, obese, laying in bed  HEENT: NC/AT; PERRL in L eye, R eye is false, erythematous conjunctiva, tongue fasciculations  Neck: supple  Respiratory: CTA b/l  Cardiovascular: +S1/S2; tachycardic, regular rate, no murmues  Abdomen: soft, NT/ND; +BS x4  Extremities/Vasc: WWP, 2+ peripheral pulses b/l; no LE edema, bilateral hand tremor on extension  MSK: no joint swelling  Skin: normal color and turgor; no rash  Neurological: AAOx1, lethargic but easily arousable to voice, moves all extremities spontaneously

## 2017-07-01 NOTE — H&P ADULT - NSHPSOCIALHISTORY_GEN_ALL_CORE
Patient lives at [facility]. Denies illicit drug use or tobacco use. Alcohol history for 40 years, drinks 5 pints of vodka daily.

## 2017-07-01 NOTE — ED ADULT NURSE REASSESSMENT NOTE - NS ED NURSE REASSESS COMMENT FT1
pt. is sleeping, responsive to all stimuli, c/o cough and headache, remains tachycardic, MD Hadley was notified. VS documented, pt. had some juice and water, ambulated to the bathroom as per pt. he urinated and had BM, no diarrhea reported, no blood in stool or urine. no c.o chest pain at present time, will monitor.

## 2017-07-02 DIAGNOSIS — R63.8 OTHER SYMPTOMS AND SIGNS CONCERNING FOOD AND FLUID INTAKE: ICD-10-CM

## 2017-07-02 LAB
ANION GAP SERPL CALC-SCNC: 11 MMOL/L — SIGNIFICANT CHANGE UP (ref 5–17)
BASOPHILS NFR BLD AUTO: 0.6 % — SIGNIFICANT CHANGE UP (ref 0–2)
BUN SERPL-MCNC: 9 MG/DL — SIGNIFICANT CHANGE UP (ref 7–23)
CALCIUM SERPL-MCNC: 9.2 MG/DL — SIGNIFICANT CHANGE UP (ref 8.4–10.5)
CHLORIDE SERPL-SCNC: 95 MMOL/L — LOW (ref 96–108)
CO2 SERPL-SCNC: 31 MMOL/L — SIGNIFICANT CHANGE UP (ref 22–31)
CREAT SERPL-MCNC: 0.9 MG/DL — SIGNIFICANT CHANGE UP (ref 0.5–1.3)
CRP SERPL-MCNC: 0.7 MG/DL — HIGH (ref 0–0.4)
EOSINOPHIL NFR BLD AUTO: 2.8 % — SIGNIFICANT CHANGE UP (ref 0–6)
ERYTHROCYTE [SEDIMENTATION RATE] IN BLOOD: 6 MM/HR — SIGNIFICANT CHANGE UP
GLUCOSE SERPL-MCNC: 134 MG/DL — HIGH (ref 70–99)
HCT VFR BLD CALC: 38.5 % — LOW (ref 39–50)
HGB BLD-MCNC: 12.7 G/DL — LOW (ref 13–17)
LYMPHOCYTES # BLD AUTO: 14.6 % — SIGNIFICANT CHANGE UP (ref 13–44)
MAGNESIUM SERPL-MCNC: 1.8 MG/DL — SIGNIFICANT CHANGE UP (ref 1.6–2.6)
MCHC RBC-ENTMCNC: 30.9 PG — SIGNIFICANT CHANGE UP (ref 27–34)
MCHC RBC-ENTMCNC: 33 G/DL — SIGNIFICANT CHANGE UP (ref 32–36)
MCV RBC AUTO: 93.7 FL — SIGNIFICANT CHANGE UP (ref 80–100)
MONOCYTES NFR BLD AUTO: 11.1 % — SIGNIFICANT CHANGE UP (ref 2–14)
NEUTROPHILS NFR BLD AUTO: 70.9 % — SIGNIFICANT CHANGE UP (ref 43–77)
PHOSPHATE SERPL-MCNC: 3.2 MG/DL — SIGNIFICANT CHANGE UP (ref 2.5–4.5)
PLATELET # BLD AUTO: 233 K/UL — SIGNIFICANT CHANGE UP (ref 150–400)
POTASSIUM SERPL-MCNC: 3.6 MMOL/L — SIGNIFICANT CHANGE UP (ref 3.5–5.3)
POTASSIUM SERPL-SCNC: 3.6 MMOL/L — SIGNIFICANT CHANGE UP (ref 3.5–5.3)
RBC # BLD: 4.11 M/UL — LOW (ref 4.2–5.8)
RBC # FLD: 14.4 % — SIGNIFICANT CHANGE UP (ref 10.3–16.9)
SODIUM SERPL-SCNC: 137 MMOL/L — SIGNIFICANT CHANGE UP (ref 135–145)
WBC # BLD: 5.9 K/UL — SIGNIFICANT CHANGE UP (ref 3.8–10.5)
WBC # FLD AUTO: 5.9 K/UL — SIGNIFICANT CHANGE UP (ref 3.8–10.5)

## 2017-07-02 PROCEDURE — 99233 SBSQ HOSP IP/OBS HIGH 50: CPT

## 2017-07-02 PROCEDURE — 71010: CPT | Mod: 26

## 2017-07-02 PROCEDURE — 74177 CT ABD & PELVIS W/CONTRAST: CPT | Mod: 26

## 2017-07-02 RX ORDER — METRONIDAZOLE 500 MG
500 TABLET ORAL EVERY 8 HOURS
Qty: 0 | Refills: 0 | Status: DISCONTINUED | OUTPATIENT
Start: 2017-07-02 | End: 2017-07-03

## 2017-07-02 RX ORDER — HYDROCORTISONE 1 %
1 OINTMENT (GRAM) TOPICAL DAILY
Qty: 0 | Refills: 0 | Status: DISCONTINUED | OUTPATIENT
Start: 2017-07-02 | End: 2017-07-03

## 2017-07-02 RX ORDER — IPRATROPIUM/ALBUTEROL SULFATE 18-103MCG
3 AEROSOL WITH ADAPTER (GRAM) INHALATION EVERY 4 HOURS
Qty: 0 | Refills: 0 | Status: DISCONTINUED | OUTPATIENT
Start: 2017-07-02 | End: 2017-07-03

## 2017-07-02 RX ORDER — PANTOPRAZOLE SODIUM 20 MG/1
40 TABLET, DELAYED RELEASE ORAL
Qty: 0 | Refills: 0 | Status: DISCONTINUED | OUTPATIENT
Start: 2017-07-02 | End: 2017-07-03

## 2017-07-02 RX ORDER — POTASSIUM CHLORIDE 20 MEQ
40 PACKET (EA) ORAL ONCE
Qty: 0 | Refills: 0 | Status: COMPLETED | OUTPATIENT
Start: 2017-07-02 | End: 2017-07-02

## 2017-07-02 RX ORDER — MAGNESIUM SULFATE 500 MG/ML
1 VIAL (ML) INJECTION ONCE
Qty: 0 | Refills: 0 | Status: COMPLETED | OUTPATIENT
Start: 2017-07-02 | End: 2017-07-02

## 2017-07-02 RX ADMIN — Medication 500 MILLIGRAM(S): at 15:55

## 2017-07-02 RX ADMIN — ATORVASTATIN CALCIUM 80 MILLIGRAM(S): 80 TABLET, FILM COATED ORAL at 21:43

## 2017-07-02 RX ADMIN — HEPARIN SODIUM 7500 UNIT(S): 5000 INJECTION INTRAVENOUS; SUBCUTANEOUS at 00:36

## 2017-07-02 RX ADMIN — Medication 3 MILLILITER(S): at 11:49

## 2017-07-02 RX ADMIN — Medication 100 MILLIGRAM(S): at 11:08

## 2017-07-02 RX ADMIN — Medication 100 MILLIGRAM(S): at 07:18

## 2017-07-02 RX ADMIN — AMLODIPINE BESYLATE 10 MILLIGRAM(S): 2.5 TABLET ORAL at 07:18

## 2017-07-02 RX ADMIN — ATORVASTATIN CALCIUM 80 MILLIGRAM(S): 80 TABLET, FILM COATED ORAL at 00:35

## 2017-07-02 RX ADMIN — HEPARIN SODIUM 7500 UNIT(S): 5000 INJECTION INTRAVENOUS; SUBCUTANEOUS at 13:18

## 2017-07-02 RX ADMIN — BUDESONIDE AND FORMOTEROL FUMARATE DIHYDRATE 2 PUFF(S): 160; 4.5 AEROSOL RESPIRATORY (INHALATION) at 18:08

## 2017-07-02 RX ADMIN — Medication 40 MILLIEQUIVALENT(S): at 08:45

## 2017-07-02 RX ADMIN — Medication 100 MILLIGRAM(S): at 00:36

## 2017-07-02 RX ADMIN — Medication 50 MILLIGRAM(S): at 02:00

## 2017-07-02 RX ADMIN — Medication 3 MILLILITER(S): at 16:23

## 2017-07-02 RX ADMIN — Medication 25 MILLIGRAM(S): at 18:08

## 2017-07-02 RX ADMIN — Medication 3 MILLILITER(S): at 19:18

## 2017-07-02 RX ADMIN — Medication 50 MILLIGRAM(S): at 10:02

## 2017-07-02 RX ADMIN — BUDESONIDE AND FORMOTEROL FUMARATE DIHYDRATE 2 PUFF(S): 160; 4.5 AEROSOL RESPIRATORY (INHALATION) at 07:18

## 2017-07-02 RX ADMIN — LOSARTAN POTASSIUM 50 MILLIGRAM(S): 100 TABLET, FILM COATED ORAL at 07:18

## 2017-07-02 RX ADMIN — Medication 3 MILLILITER(S): at 08:34

## 2017-07-02 RX ADMIN — Medication 1 MILLIGRAM(S): at 11:49

## 2017-07-02 RX ADMIN — Medication 100 GRAM(S): at 08:45

## 2017-07-02 RX ADMIN — PANTOPRAZOLE SODIUM 40 MILLIGRAM(S): 20 TABLET, DELAYED RELEASE ORAL at 08:45

## 2017-07-02 RX ADMIN — Medication 2: at 22:22

## 2017-07-02 RX ADMIN — Medication 1 TABLET(S): at 11:08

## 2017-07-02 RX ADMIN — HEPARIN SODIUM 7500 UNIT(S): 5000 INJECTION INTRAVENOUS; SUBCUTANEOUS at 21:43

## 2017-07-02 RX ADMIN — Medication 1 MILLIGRAM(S): at 11:08

## 2017-07-02 RX ADMIN — HEPARIN SODIUM 7500 UNIT(S): 5000 INJECTION INTRAVENOUS; SUBCUTANEOUS at 07:19

## 2017-07-02 RX ADMIN — Medication 2: at 09:07

## 2017-07-02 NOTE — PROGRESS NOTE ADULT - ASSESSMENT
56M w/ heavy alcohol use (5 pints/day) and EtOH induced seizures, HTN, DM, neuropathy, R. eye blindness, recently discharge from mICU on 6/29 for acute alcohol intoxication, re-admitted overnight for alcohol intoxication after drinking 6 pints of vodka post-discharge and admitted to mICU due to high risk of DTs/seizures.  Upon admission the patient was noted to have positive blood cultures w/ bacteroides from previous admission, which is being treated with flagyl.

## 2017-07-02 NOTE — PROGRESS NOTE ADULT - SUBJECTIVE AND OBJECTIVE BOX
INTERVAL HPI/OVERNIGHT EVENTS: BECKY. The patient is complaining of SOB that is no worse than yesterday. The patient does not have chest pain, abdominal pain, fevers, chills.    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  ENMT: moist mucous membranes, no mucosal pallor, clear throat, uvula midline  Neck: supple  Respiratory: CTABL, no rales, no crackles, no wheezing  Cardiovascular: +S1, +S2, no murmurs, rubs or gallops, regular rhythm, increased rate  Gastrointestinal: abdomen soft, non distended (per patient this is his normal abdominal girth), diffusely tender to palpation  Extremities: b/l UE/LE mild edema   Vascular: cap refill <3s in all extremities  Neurological: AAO x3  Musculoskeletal: no weakness  Skin: no rashes    MEDICATIONS  (STANDING):  folic acid 1 milliGRAM(s) Oral daily  thiamine 100 milliGRAM(s) Oral daily  multivitamin/minerals 1 Tablet(s) Oral daily  heparin  Injectable 7500 Unit(s) SubCutaneous every 8 hours  losartan 50 milliGRAM(s) Oral daily  atorvastatin 80 milliGRAM(s) Oral at bedtime  buDESOnide 160 MICROgram(s)/formoterol 4.5 MICROgram(s) Inhaler 2 Puff(s) Inhalation two times a day  amLODIPine   Tablet 10 milliGRAM(s) Oral daily  insulin lispro (HumaLOG) corrective regimen sliding scale   SubCutaneous Before meals and at bedtime  dextrose 5%. 1000 milliLiter(s) (50 mL/Hr) IV Continuous <Continuous>  dextrose 50% Injectable 12.5 Gram(s) IV Push once  dextrose 50% Injectable 25 Gram(s) IV Push once  dextrose 50% Injectable 25 Gram(s) IV Push once  chlordiazePOXIDE 50 milliGRAM(s) Oral every 8 hours  metroNIDAZOLE  IVPB   IV Intermittent   metroNIDAZOLE  IVPB 500 milliGRAM(s) IV Intermittent every 6 hours  ALBUTerol/ipratropium for Nebulization 3 milliLiter(s) Nebulizer every 4 hours  pantoprazole    Tablet 40 milliGRAM(s) Oral before breakfast    MEDICATIONS  (PRN):  LORazepam   Injectable 1 milliGRAM(s) IV Push every 4 hours PRN Agitation  dextrose Gel 1 Dose(s) Oral once PRN Blood Glucose LESS THAN 70 milliGRAM(s)/deciliter  glucagon  Injectable 1 milliGRAM(s) IntraMuscular once PRN Glucose LESS THAN 70 milligrams/deciliter  benzocaine 15 mG/menthol 3.6 mG Lozenge 1 Lozenge Oral every 4 hours PRN Sore Throat      Allergies    No Known Allergies    Intolerances        LABS:                        12.7   5.9   )-----------( 233      ( 02 Jul 2017 06:11 )             38.5     07-02    137  |  95<L>  |  9   ----------------------------<  134<H>  3.6   |  31  |  0.90    Ca    9.2      02 Jul 2017 06:11  Phos  3.2     07-02  Mg     1.8     07-02    TPro  7.7  /  Alb  3.9  /  TBili  0.2  /  DBili  x   /  AST  53<H>  /  ALT  33  /  AlkPhos  78  06-30          RADIOLOGY & ADDITIONAL TESTS: Reviewed.

## 2017-07-02 NOTE — DISCHARGE NOTE ADULT - PLAN OF CARE
stop using alcohol normal cholesterol levels please continue taking your home medication: lipitor.Follow up with a primary medical doctor in the next few days. Please continue taking your home medications: amlodipine and losartan. Follow up with a primary medical doctor in the next few days. normal blood pressures You underwent alcohol withdrawal while you were at the hospital and were treated. Please seek help to continue to avoid alcohol use. cure infection Continue taking your medication as prescribed to make sure the infection is treated. Continue your home diabetes medication as prescribed. please continue taking your home medication: lipitor. Follow up with a primary medical doctor in the next few days. You underwent alcohol withdrawal while you were at the hospital and were treated. Please continue medications as prescribed. Please seek help to continue to avoid alcohol use.

## 2017-07-02 NOTE — DISCHARGE NOTE ADULT - CARE PROVIDER_API CALL
Nickie Cordoba), Internal Medicine  [address] [place] [address]  Phone: [phone]  Fax: [phone]

## 2017-07-02 NOTE — PROGRESS NOTE ADULT - PROBLEM SELECTOR PLAN 3
- C/w ISS, FSG checks; dose lantus accordingly. - C/w ISS, FSG checks; holding lantus due to low insulin requirements.

## 2017-07-02 NOTE — DISCHARGE NOTE ADULT - NSTOBACCOHOTLINE_GEN_A_NCS
Deepthi Simpson is a 89 year old female presenting with Follow-up (review labs  hypertension)   .  Medications:  medications verified and updated  denies latex allergy or sensitivity.  Patient would like communication of their results via:   Home Phone: [phone] (home).  Okay to leave a message containing results? Yes  Health Maintenance Summary     Topic Due On Due Status Completed On Postpone Until Reason    Immunization - Td/Tdap Jun 29, 1943 Postponed  Nov 1, 2017 Insurance or Financial    Immunization-Zoster  Completed Nov 3, 2009      Immunization - Pneumococcal Aug 17, 2017 Not Due Aug 17, 2016      Medicare Wellness Visit Aug 17, 2017 Not Due Aug 17, 2016      Immunization-Influenza  Completed Sep 22, 2016          Patient is up to date, no discussion needed .             [facility] Smokers Quitline ([id number])

## 2017-07-02 NOTE — DISCHARGE NOTE ADULT - HOSPITAL COURSE
56M w/ heavy alcohol use (5 pints/day) and EtOH induced seizures, HTN, DM, neuropathy, R. eye blindness, recently discharge from mICU on 6/29 for acute alcohol intoxication, re-admitted overnight on 6/30 for alcohol intoxication after drinking 6 pints of vodka post-discharge and admitted to mICU due to high risk of DTs/seizures.  Upon admission the patient was noted to have positive blood cultures w/ bacteroides from previous admission, which was treated with flagyl. the patient was transferred to the medical floor on 7/1. The patient's CIWA scores were monitored through out his hospital course and he received a librium taper with ativan PRN. 56M w/ heavy alcohol use (5 pints/day) and EtOH induced seizures, HTN, DM, neuropathy, R. eye blindness, recently discharge from MICU on 6/29 for acute alcohol intoxication, re-admitted overnight on 6/30 for alcohol intoxication after drinking 6 pints of vodka post-discharge and admitted to MICU due to high risk of DTs/seizures.  Upon admission the patient was noted to have positive blood cultures w/ bacteroides from previous admission, which was treated with flagyl. the patient was transferred to the medical floor on 7/1. The patient's CIWA scores were monitored through out his hospital course and he received a librium taper with ativan PRN. His CIWA scores slowly trended down throughout his stay. 56M w/ heavy alcohol use (5 pints/day) and EtOH induced seizures, HTN, DM, neuropathy, R. eye blindness, recently discharge from MICU on 6/29 for acute alcohol intoxication, re-admitted overnight on 6/30 for alcohol intoxication after drinking 6 pints of vodka post-discharge and admitted to MICU due to high risk of DTs/seizures.  Upon admission the patient was noted to have positive blood cultures w/ bacteroides from previous admission, which was treated with flagyl. the patient was transferred to the medical floor on 7/1. The patient's CIWA scores were monitored through out his hospital course and he received a librium taper with ativan PRN. His CIWA scores slowly trended down throughout his stay. Patient was discharged and instructed to continue taking librium and other medications as prescribed and follow up with the [facility].

## 2017-07-02 NOTE — DISCHARGE NOTE ADULT - ADDITIONAL INSTRUCTIONS
Follow up at [facility]  [address], [place], [place]   Phone: [phone] You will be contacted to follow up at [facility]  [address], [place], [place]   Phone: [phone]

## 2017-07-02 NOTE — PROGRESS NOTE ADULT - ATTENDING COMMENTS
Patient was seen and examined by me at bedside. I agree with resident's note, subjective, objective physical exam, assessment and plan with following modifications/additions.     1) EtOH withdrawal, recurrent --recently admitted.  2) Etoh abuse  3) Bacteremia 2/2 bacteroides unknown source.  4) NIDDM-2  5) Essential HTN.    Plan: c/w Librium taper, now 25 q8h. Switch Flagyl to PO. CT A/P wnl. no source of bacteremia. F/u surveillance cx, currently negative.   Est DC 7/3

## 2017-07-02 NOTE — DISCHARGE NOTE ADULT - MEDICATION SUMMARY - MEDICATIONS TO TAKE
I will START or STAY ON the medications listed below when I get home from the hospital:    metroNIDAZOLE 500 mg oral tablet  -- 1 tab(s) by mouth every 8 hours  -- Indication: For Bacteremia of undetermined etiology    losartan 50 mg oral tablet  -- 1 tab(s) by mouth once a day  -- Indication: For Hypertension    gabapentin 300 mg oral capsule  -- 1 cap(s) by mouth every 8 hours  -- Indication: For Neuropathy    metFORMIN 1000 mg oral tablet  -- 1 tab(s) by mouth 2 times a day  -- Check with your doctor before becoming pregnant.  Do not drink alcoholic beverages when taking this medication.  It is very important that you take or use this exactly as directed.  Do not skip doses or discontinue unless directed by your doctor.  Obtain medical advice before taking any non-prescription drugs as some may affect the action of this medication.  Take with food or milk.    -- Indication: For Diabetes    atorvastatin 80 mg oral tablet  -- 1 tab(s) by mouth once a day (at bedtime)  -- Indication: For HLD (hyperlipidemia)    albuterol-ipratropium 2.5 mg-0.5 mg/3 mL inhalation solution  -- 3 milliliter(s) inhaled every 4 hours  -- Indication: For COPD    Symbicort 160 mcg-4.5 mcg/inh inhalation aerosol  -- 2 puff(s) inhaled 2 times a day  -- Indication: For COPD    Norvasc 10 mg oral tablet  -- 1 tab(s) by mouth once a day  -- Indication: For Hypertension    hydrocortisone 1% topical cream  -- 1 application on skin 2 times a day to buttocks x 1 week  -- Indication: For Need for prophylactic measure    polyethylene glycol 3350 oral powder for reconstitution  -- 17 gram(s) by mouth once a day, As needed, Constipation  -- Indication: For Need for prophylactic measure    Multiple Vitamins oral tablet  -- 1 tab(s) by mouth once a day  -- Indication: For Need for prophylactic measure    folic acid 1 mg oral tablet  -- 1 tab(s) by mouth once a day  -- Indication: For Need for prophylactic measure    thiamine 100 mg oral tablet  -- 1 tab(s) by mouth once a day  -- Indication: For Need for prophylactic measure

## 2017-07-02 NOTE — PROGRESS NOTE ADULT - PROBLEM SELECTOR PLAN 2
Plan: BCx(+) for B. fragilis, resulted post-discharge.  No obvious source of bacteremia.  - C/w IV Flagyl  - CT Abd/pelvis for source hunt  - F/u surv. cultures. Plan: BCx(+) for B. fragilis, resulted post-discharge.  No obvious source of bacteremia.  - C/w IV Flagyl  - CT Abd/pelvis did not show source of infection  - F/u cultures no growth at 12 hours, continue to monitor

## 2017-07-02 NOTE — DISCHARGE NOTE ADULT - PATIENT PORTAL LINK FT
“You can access the [facility] Patient Portal, offered by [facility], by registering with the following website: [email or web address]”

## 2017-07-03 VITALS
TEMPERATURE: 98 F | OXYGEN SATURATION: 95 % | HEART RATE: 93 BPM | HEIGHT: 65 IN | RESPIRATION RATE: 16 BRPM | DIASTOLIC BLOOD PRESSURE: 88 MMHG | SYSTOLIC BLOOD PRESSURE: 150 MMHG

## 2017-07-03 DIAGNOSIS — J96.92 RESPIRATORY FAILURE, UNSPECIFIED WITH HYPERCAPNIA: ICD-10-CM

## 2017-07-03 DIAGNOSIS — I10 ESSENTIAL (PRIMARY) HYPERTENSION: ICD-10-CM

## 2017-07-03 DIAGNOSIS — F10.239 ALCOHOL DEPENDENCE WITH WITHDRAWAL, UNSPECIFIED: ICD-10-CM

## 2017-07-03 DIAGNOSIS — J39.8 OTHER SPECIFIED DISEASES OF UPPER RESPIRATORY TRACT: ICD-10-CM

## 2017-07-03 DIAGNOSIS — K64.9 UNSPECIFIED HEMORRHOIDS: ICD-10-CM

## 2017-07-03 DIAGNOSIS — F10.229 ALCOHOL DEPENDENCE WITH INTOXICATION, UNSPECIFIED: ICD-10-CM

## 2017-07-03 DIAGNOSIS — R60.9 EDEMA, UNSPECIFIED: ICD-10-CM

## 2017-07-03 DIAGNOSIS — R07.9 CHEST PAIN, UNSPECIFIED: ICD-10-CM

## 2017-07-03 DIAGNOSIS — G47.30 SLEEP APNEA, UNSPECIFIED: ICD-10-CM

## 2017-07-03 DIAGNOSIS — H54.41 BLINDNESS, RIGHT EYE, NORMAL VISION LEFT EYE: ICD-10-CM

## 2017-07-03 DIAGNOSIS — Z79.84 LONG TERM (CURRENT) USE OF ORAL HYPOGLYCEMIC DRUGS: ICD-10-CM

## 2017-07-03 DIAGNOSIS — E78.5 HYPERLIPIDEMIA, UNSPECIFIED: ICD-10-CM
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
## 2017-07-03 DIAGNOSIS — E11.40 TYPE 2 DIABETES MELLITUS WITH DIABETIC NEUROPATHY, UNSPECIFIED: ICD-10-CM

## 2017-07-03 LAB
ANION GAP SERPL CALC-SCNC: 11 MMOL/L — SIGNIFICANT CHANGE UP (ref 5–17)
BUN SERPL-MCNC: 12 MG/DL — SIGNIFICANT CHANGE UP (ref 7–23)
CALCIUM SERPL-MCNC: 9.1 MG/DL — SIGNIFICANT CHANGE UP (ref 8.4–10.5)
CHLORIDE SERPL-SCNC: 97 MMOL/L — SIGNIFICANT CHANGE UP (ref 96–108)
CO2 SERPL-SCNC: 30 MMOL/L — SIGNIFICANT CHANGE UP (ref 22–31)
CREAT SERPL-MCNC: 0.7 MG/DL — SIGNIFICANT CHANGE UP (ref 0.5–1.3)
GLUCOSE SERPL-MCNC: 141 MG/DL — HIGH (ref 70–99)
HCT VFR BLD CALC: 39 % — SIGNIFICANT CHANGE UP (ref 39–50)
HGB BLD-MCNC: 12.7 G/DL — LOW (ref 13–17)
MAGNESIUM SERPL-MCNC: 2 MG/DL — SIGNIFICANT CHANGE UP (ref 1.6–2.6)
MCHC RBC-ENTMCNC: 30.5 PG — SIGNIFICANT CHANGE UP (ref 27–34)
MCHC RBC-ENTMCNC: 32.6 G/DL — SIGNIFICANT CHANGE UP (ref 32–36)
MCV RBC AUTO: 93.8 FL — SIGNIFICANT CHANGE UP (ref 80–100)
PLATELET # BLD AUTO: 259 K/UL — SIGNIFICANT CHANGE UP (ref 150–400)
POTASSIUM SERPL-MCNC: 4 MMOL/L — SIGNIFICANT CHANGE UP (ref 3.5–5.3)
POTASSIUM SERPL-SCNC: 4 MMOL/L — SIGNIFICANT CHANGE UP (ref 3.5–5.3)
RBC # BLD: 4.16 M/UL — LOW (ref 4.2–5.8)
RBC # FLD: 14.6 % — SIGNIFICANT CHANGE UP (ref 10.3–16.9)
SODIUM SERPL-SCNC: 138 MMOL/L — SIGNIFICANT CHANGE UP (ref 135–145)
WBC # BLD: 5.4 K/UL — SIGNIFICANT CHANGE UP (ref 3.8–10.5)
WBC # FLD AUTO: 5.4 K/UL — SIGNIFICANT CHANGE UP (ref 3.8–10.5)

## 2017-07-03 PROCEDURE — 99239 HOSP IP/OBS DSCHRG MGMT >30: CPT

## 2017-07-03 PROCEDURE — 93306 TTE W/DOPPLER COMPLETE: CPT | Mod: 26

## 2017-07-03 RX ORDER — DOCUSATE SODIUM 100 MG
100 CAPSULE ORAL ONCE
Qty: 0 | Refills: 0 | Status: DISCONTINUED | OUTPATIENT
Start: 2017-07-03 | End: 2017-07-03

## 2017-07-03 RX ORDER — SENNA PLUS 8.6 MG/1
1 TABLET ORAL ONCE
Qty: 0 | Refills: 0 | Status: COMPLETED | OUTPATIENT
Start: 2017-07-03 | End: 2017-07-03

## 2017-07-03 RX ORDER — ATORVASTATIN CALCIUM 80 MG/1
1 TABLET, FILM COATED ORAL
Qty: 0 | Refills: 0 | COMMUNITY
Start: 2017-07-03

## 2017-07-03 RX ORDER — ACETAMINOPHEN 500 MG
650 TABLET ORAL ONCE
Qty: 0 | Refills: 0 | Status: COMPLETED | OUTPATIENT
Start: 2017-07-03 | End: 2017-07-03

## 2017-07-03 RX ORDER — IPRATROPIUM/ALBUTEROL SULFATE 18-103MCG
3 AEROSOL WITH ADAPTER (GRAM) INHALATION
Qty: 0 | Refills: 0 | COMMUNITY
Start: 2017-07-03

## 2017-07-03 RX ORDER — METRONIDAZOLE 500 MG
1 TABLET ORAL
Qty: 36 | Refills: 0 | OUTPATIENT
Start: 2017-07-03 | End: 2017-07-15

## 2017-07-03 RX ORDER — METRONIDAZOLE 500 MG
1 TABLET ORAL
Qty: 0 | Refills: 0 | COMMUNITY
Start: 2017-07-03

## 2017-07-03 RX ADMIN — SENNA PLUS 1 TABLET(S): 8.6 TABLET ORAL at 06:38

## 2017-07-03 RX ADMIN — BENZOCAINE AND MENTHOL 1 LOZENGE: 5; 1 LIQUID ORAL at 00:11

## 2017-07-03 RX ADMIN — Medication 2: at 07:54

## 2017-07-03 RX ADMIN — Medication 650 MILLIGRAM(S): at 00:24

## 2017-07-03 RX ADMIN — Medication 500 MILLIGRAM(S): at 06:38

## 2017-07-03 RX ADMIN — BUDESONIDE AND FORMOTEROL FUMARATE DIHYDRATE 2 PUFF(S): 160; 4.5 AEROSOL RESPIRATORY (INHALATION) at 07:54

## 2017-07-03 RX ADMIN — Medication 1 MILLIGRAM(S): at 00:24

## 2017-07-03 RX ADMIN — Medication 650 MILLIGRAM(S): at 01:00

## 2017-07-03 RX ADMIN — Medication 1 TABLET(S): at 11:29

## 2017-07-03 RX ADMIN — HEPARIN SODIUM 7500 UNIT(S): 5000 INJECTION INTRAVENOUS; SUBCUTANEOUS at 06:39

## 2017-07-03 RX ADMIN — AMLODIPINE BESYLATE 10 MILLIGRAM(S): 2.5 TABLET ORAL at 06:38

## 2017-07-03 RX ADMIN — Medication 100 MILLIGRAM(S): at 11:29

## 2017-07-03 RX ADMIN — Medication 10 MILLIGRAM(S): at 11:59

## 2017-07-03 RX ADMIN — Medication 1 SUPPOSITORY(S): at 00:11

## 2017-07-03 RX ADMIN — Medication 3 MILLILITER(S): at 11:29

## 2017-07-03 RX ADMIN — Medication 500 MILLIGRAM(S): at 00:10

## 2017-07-03 RX ADMIN — PANTOPRAZOLE SODIUM 40 MILLIGRAM(S): 20 TABLET, DELAYED RELEASE ORAL at 07:54

## 2017-07-03 RX ADMIN — Medication 3 MILLILITER(S): at 00:11

## 2017-07-03 RX ADMIN — Medication 3 MILLILITER(S): at 07:54

## 2017-07-03 RX ADMIN — LOSARTAN POTASSIUM 50 MILLIGRAM(S): 100 TABLET, FILM COATED ORAL at 06:38

## 2017-07-03 RX ADMIN — Medication 1 MILLIGRAM(S): at 11:29

## 2017-07-03 RX ADMIN — Medication 25 MILLIGRAM(S): at 02:50

## 2017-07-03 NOTE — PROGRESS NOTE ADULT - PROBLEM SELECTOR PLAN 1
CIWA score slowly trending down.   -continue to track CIWA score  -librium decreased to 25mg Q12 hrs  -ativan PRN  -c/w thiamine, folic acid, multivitamin
5-6 pints of vodka daily, post-discharge for alcohol intoxication requiring intubation.  Has hx of seizures from intoxication, unclear if has had withdrawal/DTs.  - C/w banana bag/IVF  - C/w librium 50mg q8hrs  - C/w ativan 1mg q4hr PRN
-ativan PRN  -tracking CIWA score  -librium 50mg Q8 hrs  -thiamine, folic acid, multivitamin

## 2017-07-03 NOTE — PROGRESS NOTE ADULT - PROBLEM SELECTOR PLAN 6
DASH/TLC diet Patient reports a history of hemorrhoids and requested topical medication.  - hydrocortisone suppository PRN

## 2017-07-03 NOTE — PROGRESS NOTE ADULT - PROBLEM SELECTOR PROBLEM 1
Alcohol withdrawal syndrome, with unspecified complication

## 2017-07-03 NOTE — PROGRESS NOTE ADULT - PROBLEM SELECTOR PLAN 2
Plan: BCx(+) for B. fragilis, resulted post-discharge.  No obvious source of bacteremia. CT Abd/pelvis did not show source of infection.  - C/w IV Flagyl  - continue to f/u cultures, no growth at 24h

## 2017-07-03 NOTE — PROGRESS NOTE ADULT - ATTENDING COMMENTS
Patient was seen and examined by me at bedside. I agree with resident's note, subjective, objective physical exam, assessment and plan with following modifications/additions.     1) EtOH withdrawal, recurrent --recently admitted.  2) Etoh abuse  3) Bacteremia 2/2 bacteroides unknown source.  4) NIDDM-2  5) Essential HTN.    Plan: c/w Librium taper, now 25 q12h x 2 days. c/w Flagyl PO, total 14 days. F/u surveillance cx, currently negative.   DC home outpatient follow up.

## 2017-07-03 NOTE — PROGRESS NOTE ADULT - PROBLEM SELECTOR PLAN 4
c/w amlodipine and losartan, monitor VS
c/w amlodipine and losartan, monitor VS
- C/w losartan  - C/w norvasc

## 2017-07-03 NOTE — PROGRESS NOTE ADULT - SUBJECTIVE AND OBJECTIVE BOX
OVERNIGHT EVENTS: Patient requested medication for his hemorrhoids No other acute events.    SUBJECTIVE:  Patient continues to report a mild headache and stomach ache that have not changed since yesterday.  He denies vomiting and reports eating well with a good apatite. He reports chills and night sweats but denies having a fever. He also reports having a cough productive of yellow sputum since he was intubated on last admission. He reports moving his bowels well and denies diarrhea.    Vital Signs Last 12 Hrs  T(F): 98.4 (07-03-17 @ 08:42), Max: 98.8 (07-03-17 @ 06:00)  HR: 93 (07-03-17 @ 08:42) (92 - 101)  BP: 150/88 (07-03-17 @ 08:42) (119/73 - 155/83)  BP(mean): --  RR: 16 (07-03-17 @ 08:42) (16 - 21)  SpO2: 95% (07-03-17 @ 08:42) (95% - 97%)  I&O's Summary    02 Jul 2017 07:01  -  03 Jul 2017 07:00  --------------------------------------------------------  IN: 340 mL / OUT: 580 mL / NET: -240 mL    PHYSICAL EXAM:  Constitutional: NAD, AAOx3, sitting in bed, appearing slightly agitated.   Respiratory: Normal rate, rhythm, depth, effort. decreased air flow on left side.    Cardiovascular: RRR, normal S1 and S2, no m/r/g.   Gastrointestinal: +BS, soft NT, distended.   Extremities: wwp, slight edema. Slight flapping tremor with arms extended.   Vascular: Pulses equal and strong throughout.   Neurological: Cranial nerves grossly intact, strength and sensation intact throughout.   Skin: No gross skin abnormalities.     LABS:                     12.7   5.4   )-----------( 259      ( 03 Jul 2017 05:50 )             39.0     07-03    138  |  97  |  12  ----------------------------<  141<H>  4.0   |  30  |  0.70    Ca    9.1      03 Jul 2017 05:49  Phos  3.2     07-02  Mg     2.0     07-03    RADIOLOGY & ADDITIONAL TESTS:  Xray Chest 1 View AP-PORTABLE IMMEDIATE (07.02.17 @ 08:21)  INTERPRETATION:  Clinical History: Shortness of breath  Frontal examination the chest demonstrates the heart to be within normal   limits in transverse diameter. No acute infiltrates. Degenerative changes   thoracic spine.  Impression: No acute infiltrates    MEDICATIONS  (STANDING):  folic acid 1 milliGRAM(s) Oral daily  thiamine 100 milliGRAM(s) Oral daily  multivitamin/minerals 1 Tablet(s) Oral daily  heparin  Injectable 7500 Unit(s) SubCutaneous every 8 hours  losartan 50 milliGRAM(s) Oral daily  atorvastatin 80 milliGRAM(s) Oral at bedtime  buDESOnide 160 MICROgram(s)/formoterol 4.5 MICROgram(s) Inhaler 2 Puff(s) Inhalation two times a day  amLODIPine   Tablet 10 milliGRAM(s) Oral daily  insulin lispro (HumaLOG) corrective regimen sliding scale   SubCutaneous Before meals and at bedtime  dextrose 5%. 1000 milliLiter(s) (50 mL/Hr) IV Continuous <Continuous>  dextrose 50% Injectable 12.5 Gram(s) IV Push once  dextrose 50% Injectable 25 Gram(s) IV Push once  dextrose 50% Injectable 25 Gram(s) IV Push once  ALBUTerol/ipratropium for Nebulization 3 milliLiter(s) Nebulizer every 4 hours  pantoprazole    Tablet 40 milliGRAM(s) Oral before breakfast  metroNIDAZOLE    Tablet 500 milliGRAM(s) Oral every 8 hours  chlordiazePOXIDE 25 milliGRAM(s) Oral every 12 hours    MEDICATIONS  (PRN):  dextrose Gel 1 Dose(s) Oral once PRN Blood Glucose LESS THAN 70 milliGRAM(s)/deciliter  glucagon  Injectable 1 milliGRAM(s) IntraMuscular once PRN Glucose LESS THAN 70 milligrams/deciliter  benzocaine 15 mG/menthol 3.6 mG Lozenge 1 Lozenge Oral every 4 hours PRN Sore Throat  LORazepam     Tablet 1 milliGRAM(s) Oral every 4 hours PRN CIWA > 8  hydrocortisone hemorrhoidal Suppository 1 Suppository(s) Rectal daily PRN hemorrhoids  docusate sodium 100 milliGRAM(s) Oral once PRN Constipation

## 2017-07-03 NOTE — PROGRESS NOTE ADULT - PROBLEM SELECTOR PROBLEM 2
Bacteremia of undetermined etiology

## 2017-07-06 LAB
CULTURE RESULTS: SIGNIFICANT CHANGE UP
SPECIMEN SOURCE: SIGNIFICANT CHANGE UP

## 2017-07-07 DIAGNOSIS — Z79.82 LONG TERM (CURRENT) USE OF ASPIRIN: ICD-10-CM

## 2017-07-07 DIAGNOSIS — E78.5 HYPERLIPIDEMIA, UNSPECIFIED: ICD-10-CM

## 2017-07-07 DIAGNOSIS — Z90.49 ACQUIRED ABSENCE OF OTHER SPECIFIED PARTS OF DIGESTIVE TRACT: ICD-10-CM

## 2017-07-07 DIAGNOSIS — R60.0 LOCALIZED EDEMA: ICD-10-CM

## 2017-07-07 DIAGNOSIS — F10.239 ALCOHOL DEPENDENCE WITH WITHDRAWAL, UNSPECIFIED: ICD-10-CM

## 2017-07-07 DIAGNOSIS — Z97.0 PRESENCE OF ARTIFICIAL EYE: ICD-10-CM

## 2017-07-07 DIAGNOSIS — Z79.899 OTHER LONG TERM (CURRENT) DRUG THERAPY: ICD-10-CM

## 2017-07-07 DIAGNOSIS — R01.1 CARDIAC MURMUR, UNSPECIFIED: ICD-10-CM

## 2017-07-07 DIAGNOSIS — Z59.0 HOMELESSNESS: ICD-10-CM

## 2017-07-07 DIAGNOSIS — Z98.1 ARTHRODESIS STATUS: ICD-10-CM

## 2017-07-07 DIAGNOSIS — F10.129 ALCOHOL ABUSE WITH INTOXICATION, UNSPECIFIED: ICD-10-CM
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
## 2017-07-07 DIAGNOSIS — F10.229 ALCOHOL DEPENDENCE WITH INTOXICATION, UNSPECIFIED: ICD-10-CM

## 2017-07-07 DIAGNOSIS — E11.9 TYPE 2 DIABETES MELLITUS WITHOUT COMPLICATIONS: ICD-10-CM

## 2017-07-07 DIAGNOSIS — E66.9 OBESITY, UNSPECIFIED: ICD-10-CM

## 2017-07-07 DIAGNOSIS — B96.6 BACTEROIDES FRAGILIS [B. FRAGILIS] AS THE CAUSE OF DISEASES CLASSIFIED ELSEWHERE: ICD-10-CM

## 2017-07-07 DIAGNOSIS — R78.81 BACTEREMIA: ICD-10-CM

## 2017-07-07 DIAGNOSIS — Z79.1 LONG TERM (CURRENT) USE OF NON-STEROIDAL ANTI-INFLAMMATORIES (NSAID): ICD-10-CM

## 2017-07-07 DIAGNOSIS — G92 TOXIC ENCEPHALOPATHY: ICD-10-CM

## 2017-07-07 DIAGNOSIS — H54.41 BLINDNESS, RIGHT EYE, NORMAL VISION LEFT EYE: ICD-10-CM

## 2017-07-07 DIAGNOSIS — G62.9 POLYNEUROPATHY, UNSPECIFIED: ICD-10-CM

## 2017-07-07 DIAGNOSIS — I10 ESSENTIAL (PRIMARY) HYPERTENSION: ICD-10-CM

## 2017-07-07 DIAGNOSIS — K64.9 UNSPECIFIED HEMORRHOIDS: ICD-10-CM

## 2017-07-07 SDOH — ECONOMIC STABILITY - HOUSING INSECURITY: HOMELESSNESS: Z59.0

## 2017-07-23 PROCEDURE — 84100 ASSAY OF PHOSPHORUS: CPT

## 2017-07-23 PROCEDURE — 87040 BLOOD CULTURE FOR BACTERIA: CPT

## 2017-07-23 PROCEDURE — 83690 ASSAY OF LIPASE: CPT

## 2017-07-23 PROCEDURE — 94640 AIRWAY INHALATION TREATMENT: CPT

## 2017-07-23 PROCEDURE — 36415 COLL VENOUS BLD VENIPUNCTURE: CPT

## 2017-07-23 PROCEDURE — 84484 ASSAY OF TROPONIN QUANT: CPT

## 2017-07-23 PROCEDURE — 83036 HEMOGLOBIN GLYCOSYLATED A1C: CPT

## 2017-07-23 PROCEDURE — 96374 THER/PROPH/DIAG INJ IV PUSH: CPT

## 2017-07-23 PROCEDURE — 93306 TTE W/DOPPLER COMPLETE: CPT

## 2017-07-23 PROCEDURE — 85652 RBC SED RATE AUTOMATED: CPT

## 2017-07-23 PROCEDURE — 71045 X-RAY EXAM CHEST 1 VIEW: CPT

## 2017-07-23 PROCEDURE — 99285 EMERGENCY DEPT VISIT HI MDM: CPT | Mod: 25

## 2017-07-23 PROCEDURE — 85027 COMPLETE CBC AUTOMATED: CPT

## 2017-07-23 PROCEDURE — 80053 COMPREHEN METABOLIC PANEL: CPT

## 2017-07-23 PROCEDURE — 83605 ASSAY OF LACTIC ACID: CPT

## 2017-07-23 PROCEDURE — 96375 TX/PRO/DX INJ NEW DRUG ADDON: CPT

## 2017-07-23 PROCEDURE — 85025 COMPLETE CBC W/AUTO DIFF WBC: CPT

## 2017-07-23 PROCEDURE — 80048 BASIC METABOLIC PNL TOTAL CA: CPT

## 2017-07-23 PROCEDURE — 80307 DRUG TEST PRSMV CHEM ANLYZR: CPT

## 2017-07-23 PROCEDURE — 86140 C-REACTIVE PROTEIN: CPT

## 2017-07-23 PROCEDURE — 96376 TX/PRO/DX INJ SAME DRUG ADON: CPT

## 2017-07-23 PROCEDURE — 74177 CT ABD & PELVIS W/CONTRAST: CPT

## 2017-07-23 PROCEDURE — 74018 RADEX ABDOMEN 1 VIEW: CPT

## 2017-07-23 PROCEDURE — 93005 ELECTROCARDIOGRAM TRACING: CPT

## 2017-07-23 PROCEDURE — 83735 ASSAY OF MAGNESIUM: CPT

## 2017-12-15 PROCEDURE — 84100 ASSAY OF PHOSPHORUS: CPT

## 2017-12-15 PROCEDURE — 85610 PROTHROMBIN TIME: CPT

## 2017-12-15 PROCEDURE — 83880 ASSAY OF NATRIURETIC PEPTIDE: CPT

## 2017-12-15 PROCEDURE — G0378: CPT

## 2017-12-15 PROCEDURE — 87086 URINE CULTURE/COLONY COUNT: CPT

## 2017-12-15 PROCEDURE — 87150 DNA/RNA AMPLIFIED PROBE: CPT

## 2017-12-15 PROCEDURE — 99291 CRITICAL CARE FIRST HOUR: CPT | Mod: 25

## 2017-12-15 PROCEDURE — 82803 BLOOD GASES ANY COMBINATION: CPT

## 2017-12-15 PROCEDURE — 71045 X-RAY EXAM CHEST 1 VIEW: CPT

## 2017-12-15 PROCEDURE — 81001 URINALYSIS AUTO W/SCOPE: CPT

## 2017-12-15 PROCEDURE — 84484 ASSAY OF TROPONIN QUANT: CPT

## 2017-12-15 PROCEDURE — 83735 ASSAY OF MAGNESIUM: CPT

## 2017-12-15 PROCEDURE — 93005 ELECTROCARDIOGRAM TRACING: CPT | Mod: XU

## 2017-12-15 PROCEDURE — 93970 EXTREMITY STUDY: CPT

## 2017-12-15 PROCEDURE — 36415 COLL VENOUS BLD VENIPUNCTURE: CPT

## 2017-12-15 PROCEDURE — 70450 CT HEAD/BRAIN W/O DYE: CPT

## 2017-12-15 PROCEDURE — 51702 INSERT TEMP BLADDER CATH: CPT | Mod: XU

## 2017-12-15 PROCEDURE — 94640 AIRWAY INHALATION TREATMENT: CPT | Mod: XU

## 2017-12-15 PROCEDURE — 87040 BLOOD CULTURE FOR BACTERIA: CPT

## 2017-12-15 PROCEDURE — 80307 DRUG TEST PRSMV CHEM ANLYZR: CPT

## 2017-12-15 PROCEDURE — 80053 COMPREHEN METABOLIC PANEL: CPT

## 2017-12-15 PROCEDURE — 85379 FIBRIN DEGRADATION QUANT: CPT

## 2017-12-15 PROCEDURE — 85025 COMPLETE CBC W/AUTO DIFF WBC: CPT

## 2017-12-15 PROCEDURE — 82550 ASSAY OF CK (CPK): CPT

## 2017-12-15 PROCEDURE — 83605 ASSAY OF LACTIC ACID: CPT

## 2017-12-15 PROCEDURE — 80048 BASIC METABOLIC PNL TOTAL CA: CPT

## 2017-12-15 PROCEDURE — 96374 THER/PROPH/DIAG INJ IV PUSH: CPT | Mod: XU

## 2017-12-15 PROCEDURE — 83690 ASSAY OF LIPASE: CPT

## 2017-12-15 PROCEDURE — 31500 INSERT EMERGENCY AIRWAY: CPT

## 2017-12-15 PROCEDURE — 96375 TX/PRO/DX INJ NEW DRUG ADDON: CPT | Mod: XU

## 2017-12-15 PROCEDURE — 85730 THROMBOPLASTIN TIME PARTIAL: CPT

## 2017-12-15 PROCEDURE — 85027 COMPLETE CBC AUTOMATED: CPT

## 2018-01-29 NOTE — PROVIDER CONTACT NOTE (OTHER) - ACTION/TREATMENT ORDERED:
Chief complaint:   Chief Complaint   Patient presents with   • Ear Fullness     poss ear infection and fever        Vitals:  Visit Vitals  Pulse 128   Temp 100 °F (37.8 °C)   Resp 24   Ht 2' 9.86\" (0.86 m)   Wt 11.7 kg   BMI 15.79 kg/m²       HISTORY OF PRESENT ILLNESS     He has had diarrhea for the past week, about 2-3 times per day.  He has had rhinorrhea, nasal congestion and cough for the past week. He has been using albuterol bid, last dose 1 hour ago. He was having fevers tmax 100.8. Last night he spiked a temp 104 (rectal). He took ibuprofen and it went down to 102. Last dose of tylenol/ibuprofen 10 hours ago.    Sister has pneumonia, mom has ear infection.             Other significant problems:  Patient Active Problem List    Diagnosis Date Noted   • Recurrent otitis media bilateral 09/06/2017     Priority: Low     8/17/17 ELIE Edwards - recommend bilateral ventilation tube insertion, ears are clear today following removal of cerumen, discussed scheduling surgery out 4-6 weeks and we will see him in clinic one week prior to surgery, we can always cancel if he has not had any more ear infections.     • OM list 09/16/2016     Priority: Low     06/23/15-10/22/15--NO OM  10/23/15--Audrey--Left TM erythematous, pus behind, bulging. Right TM WNL--Amox prescribed  12/18/15--Agustín--Left serous fluid, mild erythema--Amox prescribed  07/14/16--LORY Brown, Walk-in--Left tympanic membrane intact with erythema and decreased landmarks--Amox prescribed--Switched to Augmentin after ER visit  09/14/16--Agustín--Left ear with mucoid fluid and erythema. Right ear clear--azithromycin  10/1/16, 10/21/16 Clear  12/5/16 SR - Rt TM Fluid/Erythema. Amox  12/22/16 LK - Clear  12/28/16 SR - Clear  1/3/17 SR - Lt Erythema and Mucoid fluid, Rt Cerumen in adhered to canal. Amox  2/9/17 LK - Rt Serous fluid behind TM and erythema. Amox  2/13/17 LK - Clear, Resolved  2/24/17 LK - Jose TM, erythema, pus behind and pulging. Aug 
ES     • Frequent infections 09/14/2016     Priority: Low     9/2016--hand-foot-and-mouth, strep, viral and otitis media. we discussed his  exposure, the fact that they combine classes ([employer] love)as a significant increase in his exposures,  There are no other options at this time--will make otitis media list  1/2017--discussed again, mom is able to work from home, will likely remove him from      • GERD (gastroesophageal reflux disease) 2015     Priority: Low     12/7/15--ELIE Curiel--Switch the acid supression to Prilosec 7 mg QAM. Continue neocate. Try to mix 15 mL of prune juice into this rice cereal. He is significantly improved in 1-2 months the family may want to re-try the hydrolysate formula. Return to clinic in 1-2 months.   2/2/16 ELIE Curiel-- start weaning off of PPI by giving the medicine every other day to see if he has increased problems. No need to return for f/u with GI if he is able to wean off PPI in the next month. If he is still on medicine in 2 months he can return at that time.      • Hemangioma 2015     Priority: Low     Left cheek   11/12/15--Daisy Bergman MD-diagnosis of deep infantile hemangioma. Decided to initiate treatment with timolol GFS one drop twice daily. Discussed possibly starting propranolol at next visit. May also consider referral to opthalmology if hemangioma appears to be affecting his vision. Follow up Dec. 7th.  1/21/16 MD Dre- Mixed superficial and deep hemangioma. Now stabilized and no longer growing. Timolol would be continued until hemangioma starts to involute at ~ 12 months of age. F/u 3 months.   10/4/16- Bharath Kolb MD- involuting as we would expect, off the timolol for about 2 months, return in about 1 year.      • Chronic cough 2015     Priority: Low     Dad with significant childhood asthma  10/2015- started Pulmicort, improved significantly--  Was doing blow by Pulmicort with symptoms, changed to QVAR     • 
Milk protein allergy 2015     Priority: Low     @ 2 weeks- mom to decide about removing raw dairy vs switching to Alimentum/Nutramigen  @ 4 months- on Elecare     • Heart murmur 2015     Priority: Low     @ 2 weeks- normal pulse ox and BP     • Gestational age, 36 weeks 2015     Priority: Low     36 1/7 weeks gestation. EDC: 2015     • Single liveborn, born in hospital, delivered by  section 2014     Priority: Low     Repeat . History of septate uterus/oligohydramnios/IUGR with non-reassuring FHT's.     •  infant, 2,000-2,499 grams 2015     Priority: Low     Birth weight 2450 grams.   Micrognathia, wide nasal bridge, borderline low set ears, large ears, probable mild microcephaly  [facility] obtained (during hospitalization) , micro-ray negative, all testing negative, follow up as needed           PAST MEDICAL, FAMILY AND SOCIAL HISTORY     Medications:  Current Outpatient Prescriptions   Medication   • DISPENSE   • albuterol (VENTOLIN) (2.5 MG/3ML) 0.083% nebulizer solution   • Spacer/Aero-Holding Chambers (AEROCHAMBER PLUS W/MASK) Misc   • beclomethasone diprop (QVAR) 40 MCG/ACT inhaler   • budesonide (PULMICORT) 0.5 MG/2ML nebulizer suspension     No current facility-administered medications for this visit.        Allergies:  ALLERGIES:   Allergen Reactions   • Milk GI UPSET       Past Medical  History/Surgeries:  Past Medical History:   Diagnosis Date   • Dysmorphic craniofacial features 2015    Micrognathia, wide nasal bridge, borderline low set ears, large ears, probable mild microcephaly [facility]etics [facility] obtained (during hospitalization) , micro-ray negative, all testing negative, follow up as needed    • GERD (gastroesophageal reflux disease) 2015    12/7/15--ELIE Curiel--Switch the acid supression to Prilosec 7 mg QAM. Continue neocate. Try to mix 15 mL of prune juice into this rice cereal. He is 
10 mg oxycodone PO given as per md order
significantly improved in 1-2 months the family may want to re-try the hydrolysate formula. Return to clinic in 1-2 months.  16 ELIE Curiel-- start weaning off of PPI by giving the medicine every other day to see if he has increased problems. No need to return for f/u with GI if he is able to wean off PPI in the next month. If he is still on medicine in 2 months he can return at that time.     • Gestational age, 36 weeks 2015    36 1/7 weeks gestation. EDC: 2015    • Hemangioma 2015    11/12/15--Daisy Bergman MD-diagnosis of deep infantile hemangioma. Decided to initiate treatment with timolol GFS one drop twice daily. Discussed possibly starting propranolol at next visit. May also consider referral to opthalmology if hemangioma appears to be affecting his vision. Follow up Dec. 7th. 16 MD Dre- Mixed superficial and deep hemangioma. Now stabilized and no longer growing. Timolol would be continued until hemangioma starts to involute at ~ 12 months of age. F/u 3 months.      •  infant, 2,000-2,499 grams 2015    Birth weight 2450 grams.         History reviewed. No pertinent surgical history.    Family History:  Family History   Problem Relation Age of Onset   • Anemia Mother    • Cancer Other      aunt- breast   • Cancer Other      grandmother- cervical       Social History:  Social History   Substance Use Topics   • Smoking status: Never Smoker   • Smokeless tobacco: Never Used   • Alcohol use Not on file       REVIEW OF SYSTEMS     Review of Systems   Constitutional: Positive for activity change, appetite change and fever. Negative for crying and irritability.        Good po fluid intake   HENT: Positive for congestion and rhinorrhea. Negative for ear pain and sore throat.    Respiratory: Positive for cough.    Gastrointestinal: Positive for diarrhea. Negative for abdominal pain, nausea and vomiting.   Genitourinary: Negative for decreased urine volume.   Skin: Negative for 
rash.   Neurological: Negative for weakness.       PHYSICAL EXAM     Physical Exam   Constitutional: He appears well-developed and well-nourished. He is active.  Non-toxic appearance. No distress.   HENT:   Head: No cranial deformity.   Right Ear: Tympanic membrane, external ear, pinna and canal normal. No drainage, swelling or tenderness. No middle ear effusion.   Left Ear: Tympanic membrane, external ear, pinna and canal normal. No drainage, swelling or tenderness.  No middle ear effusion.   Nose: No nasal discharge or congestion.   Mouth/Throat: Mucous membranes are moist. No gingival swelling or oral lesions. No oropharyngeal exudate, pharynx swelling, pharynx erythema, pharynx petechiae or pharyngeal vesicles. No tonsillar exudate. Oropharynx is clear. Pharynx is normal.   Eyes: Conjunctivae and lids are normal. Right eye exhibits no discharge, no exudate and no erythema. Left eye exhibits no discharge, no exudate and no erythema. Right conjunctiva is not injected. Left conjunctiva is not injected. No periorbital edema or erythema on the right side. No periorbital edema or erythema on the left side.   Neck: Normal range of motion. Neck supple.   Cardiovascular: Normal rate, regular rhythm, S1 normal and S2 normal.  Exam reveals no S3, no S4 and no friction rub.    No murmur heard.  Pulmonary/Chest: Effort normal. There is normal air entry. No accessory muscle usage, stridor or grunting. No respiratory distress. He has wheezes. He has no rhonchi. He has no rales. He exhibits no deformity.   Occasional wheezing in upper lobes, good AE   Abdominal: Soft. He exhibits no distension and no mass. There is no hepatosplenomegaly. There is no tenderness. There is no rigidity.   Musculoskeletal: Normal range of motion. He exhibits no edema or deformity.   Neurological: He is alert. He has normal strength.   Skin: Skin is warm and moist. Capillary refill takes less than 3 seconds. No rash noted. No cyanosis. No jaundice. 
      ASSESSMENT/PLAN     (R05) Cough  (primary encounter diagnosis)  (R50.9) Fever, unspecified fever cause  Comment:  R/o pneumonia  Plan: XR CHEST PA AND LATERAL, amoxicillin (AMOXIL)         400 MG/5ML suspension        CXR negative, continue albuterol bid    (J01.90) Acute sinusitis, recurrence not specified, unspecified location  Comment: fever worsening, sx x1 week  Plan: amoxicillin (AMOXIL) 400 MG/5ML suspension        Antibiotics as prescribed. Parent to follow-up if symptoms continue for 5-7 days, symptoms worsen, or if the parent has any further concerns.         
PA aware. warm packs and blood work ordered. will continue to monitor.
oxycodone given 10 mg as per md order. Will continue to monitor
Continue to monitor patient closely
Attending Attestation (For Attendings USE Only)...

## 2018-03-14 ENCOUNTER — EMERGENCY (EMERGENCY)
Facility: HOSPITAL | Age: 58
LOS: 1 days | Discharge: HOME | End: 2018-03-14
Attending: EMERGENCY MEDICINE | Admitting: EMERGENCY MEDICINE
Payer: MEDICAID

## 2018-03-14 VITALS
RESPIRATION RATE: 20 BRPM | DIASTOLIC BLOOD PRESSURE: 91 MMHG | TEMPERATURE: 97 F | HEART RATE: 102 BPM | OXYGEN SATURATION: 91 % | SYSTOLIC BLOOD PRESSURE: 151 MMHG

## 2018-03-14 DIAGNOSIS — I10 ESSENTIAL (PRIMARY) HYPERTENSION: ICD-10-CM

## 2018-03-14 DIAGNOSIS — Z79.84 LONG TERM (CURRENT) USE OF ORAL HYPOGLYCEMIC DRUGS: ICD-10-CM

## 2018-03-14 DIAGNOSIS — F10.129 ALCOHOL ABUSE WITH INTOXICATION, UNSPECIFIED: ICD-10-CM

## 2018-03-14 DIAGNOSIS — E11.9 TYPE 2 DIABETES MELLITUS WITHOUT COMPLICATIONS: ICD-10-CM

## 2018-03-14 DIAGNOSIS — R41.82 ALTERED MENTAL STATUS, UNSPECIFIED: ICD-10-CM

## 2018-03-14 DIAGNOSIS — Z98.890 OTHER SPECIFIED POSTPROCEDURAL STATES: Chronic | ICD-10-CM

## 2018-03-14 DIAGNOSIS — Z79.899 OTHER LONG TERM (CURRENT) DRUG THERAPY: ICD-10-CM

## 2018-03-14 DIAGNOSIS — E78.5 HYPERLIPIDEMIA, UNSPECIFIED: ICD-10-CM

## 2018-03-14 PROCEDURE — 99284 EMERGENCY DEPT VISIT MOD MDM: CPT | Mod: 25

## 2018-03-14 NOTE — ED PROVIDER NOTE - PROGRESS NOTE DETAILS
patient is awake, more coherent, and speaking in full sentences. notes he is heavy smoker, and takes metformin for NIDDM. Has not taken it one week and requesting a refill. patient otherwise has no acute medical complaints. patient ambulatory with normal gait, and no ss withdrawl.

## 2018-03-14 NOTE — ED PROVIDER NOTE - OBJECTIVE STATEMENT
BIB EMS for ETOH intoxication, admits to ETOH today, no other complaints, unable to cooperate with remainder of history

## 2018-03-15 VITALS
HEART RATE: 99 BPM | DIASTOLIC BLOOD PRESSURE: 83 MMHG | TEMPERATURE: 98 F | SYSTOLIC BLOOD PRESSURE: 139 MMHG | RESPIRATION RATE: 16 BRPM | OXYGEN SATURATION: 96 %

## 2018-03-15 RX ORDER — METFORMIN HYDROCHLORIDE 850 MG/1
500 TABLET ORAL ONCE
Qty: 0 | Refills: 0 | Status: COMPLETED | OUTPATIENT
Start: 2018-03-15 | End: 2018-03-15

## 2018-03-15 RX ADMIN — METFORMIN HYDROCHLORIDE 500 MILLIGRAM(S): 850 TABLET ORAL at 03:59

## 2018-03-19 ENCOUNTER — EMERGENCY (EMERGENCY)
Facility: HOSPITAL | Age: 58
LOS: 0 days | Discharge: HOME | End: 2018-03-19
Attending: EMERGENCY MEDICINE
Payer: MEDICAID

## 2018-03-19 VITALS
RESPIRATION RATE: 17 BRPM | TEMPERATURE: 97 F | HEART RATE: 103 BPM | HEIGHT: 65 IN | SYSTOLIC BLOOD PRESSURE: 130 MMHG | DIASTOLIC BLOOD PRESSURE: 86 MMHG | OXYGEN SATURATION: 96 % | WEIGHT: 225.09 LBS

## 2018-03-19 VITALS
HEART RATE: 106 BPM | OXYGEN SATURATION: 99 % | RESPIRATION RATE: 18 BRPM | DIASTOLIC BLOOD PRESSURE: 81 MMHG | TEMPERATURE: 98 F | SYSTOLIC BLOOD PRESSURE: 152 MMHG

## 2018-03-19 DIAGNOSIS — R06.02 SHORTNESS OF BREATH: ICD-10-CM

## 2018-03-19 DIAGNOSIS — R09.81 NASAL CONGESTION: ICD-10-CM

## 2018-03-19 DIAGNOSIS — Z98.890 OTHER SPECIFIED POSTPROCEDURAL STATES: Chronic | ICD-10-CM

## 2018-03-19 DIAGNOSIS — R05 COUGH: ICD-10-CM

## 2018-03-19 DIAGNOSIS — R07.9 CHEST PAIN, UNSPECIFIED: ICD-10-CM

## 2018-03-19 DIAGNOSIS — I10 ESSENTIAL (PRIMARY) HYPERTENSION: ICD-10-CM

## 2018-03-19 DIAGNOSIS — J44.9 CHRONIC OBSTRUCTIVE PULMONARY DISEASE, UNSPECIFIED: ICD-10-CM

## 2018-03-19 DIAGNOSIS — J40 BRONCHITIS, NOT SPECIFIED AS ACUTE OR CHRONIC: ICD-10-CM

## 2018-03-19 LAB
ALBUMIN SERPL ELPH-MCNC: 3.3 G/DL — SIGNIFICANT CHANGE UP (ref 3.3–5)
ALP SERPL-CCNC: 117 U/L — SIGNIFICANT CHANGE UP (ref 40–120)
ALT FLD-CCNC: 58 U/L — SIGNIFICANT CHANGE UP (ref 12–78)
ANION GAP SERPL CALC-SCNC: 7 MMOL/L — SIGNIFICANT CHANGE UP (ref 5–17)
APPEARANCE UR: CLEAR — SIGNIFICANT CHANGE UP
AST SERPL-CCNC: 53 U/L — HIGH (ref 15–37)
BACTERIA # UR AUTO: ABNORMAL
BASOPHILS # BLD AUTO: 0.07 K/UL — SIGNIFICANT CHANGE UP (ref 0–0.2)
BASOPHILS NFR BLD AUTO: 0.8 % — SIGNIFICANT CHANGE UP (ref 0–2)
BILIRUB SERPL-MCNC: 0.2 MG/DL — SIGNIFICANT CHANGE UP (ref 0.2–1.2)
BILIRUB UR-MCNC: NEGATIVE — SIGNIFICANT CHANGE UP
BUN SERPL-MCNC: 12 MG/DL — SIGNIFICANT CHANGE UP (ref 7–23)
CALCIUM SERPL-MCNC: 8.1 MG/DL — LOW (ref 8.5–10.1)
CHLORIDE SERPL-SCNC: 96 MMOL/L — SIGNIFICANT CHANGE UP (ref 96–108)
CK MB BLD-MCNC: 0.3 % — SIGNIFICANT CHANGE UP (ref 0–3.5)
CK MB CFR SERPL CALC: 3.3 NG/ML — SIGNIFICANT CHANGE UP (ref 0.5–3.6)
CK MB CFR SERPL CALC: 4.1 NG/ML — HIGH (ref 0.5–3.6)
CK SERPL-CCNC: 1151 U/L — HIGH (ref 26–308)
CK SERPL-CCNC: 1173 U/L — HIGH (ref 26–308)
CO2 SERPL-SCNC: 33 MMOL/L — HIGH (ref 22–31)
COLOR SPEC: YELLOW — SIGNIFICANT CHANGE UP
CREAT SERPL-MCNC: 0.69 MG/DL — SIGNIFICANT CHANGE UP (ref 0.5–1.3)
DIFF PNL FLD: NEGATIVE — SIGNIFICANT CHANGE UP
EOSINOPHIL # BLD AUTO: 0.11 K/UL — SIGNIFICANT CHANGE UP (ref 0–0.5)
EOSINOPHIL NFR BLD AUTO: 1.3 % — SIGNIFICANT CHANGE UP (ref 0–6)
EPI CELLS # UR: SIGNIFICANT CHANGE UP
FLUAV SPEC QL CULT: NEGATIVE — SIGNIFICANT CHANGE UP
FLUBV AG SPEC QL IA: NEGATIVE — SIGNIFICANT CHANGE UP
GLUCOSE SERPL-MCNC: 132 MG/DL — HIGH (ref 70–99)
GLUCOSE UR QL: NEGATIVE MG/DL — SIGNIFICANT CHANGE UP
HCT VFR BLD CALC: 35.8 % — LOW (ref 39–50)
HGB BLD-MCNC: 11.7 G/DL — LOW (ref 13–17)
IMM GRANULOCYTES NFR BLD AUTO: 0.5 % — SIGNIFICANT CHANGE UP (ref 0–1.5)
INR BLD: 1.13 RATIO — SIGNIFICANT CHANGE UP (ref 0.88–1.16)
KETONES UR-MCNC: NEGATIVE — SIGNIFICANT CHANGE UP
LACTATE SERPL-SCNC: 1.9 MMOL/L — SIGNIFICANT CHANGE UP (ref 0.7–2)
LEUKOCYTE ESTERASE UR-ACNC: NEGATIVE — SIGNIFICANT CHANGE UP
LYMPHOCYTES # BLD AUTO: 2.09 K/UL — SIGNIFICANT CHANGE UP (ref 1–3.3)
LYMPHOCYTES # BLD AUTO: 24.3 % — SIGNIFICANT CHANGE UP (ref 13–44)
MCHC RBC-ENTMCNC: 28 PG — SIGNIFICANT CHANGE UP (ref 27–34)
MCHC RBC-ENTMCNC: 32.7 GM/DL — SIGNIFICANT CHANGE UP (ref 32–36)
MCV RBC AUTO: 85.6 FL — SIGNIFICANT CHANGE UP (ref 80–100)
MONOCYTES # BLD AUTO: 0.93 K/UL — HIGH (ref 0–0.9)
MONOCYTES NFR BLD AUTO: 10.8 % — SIGNIFICANT CHANGE UP (ref 2–14)
NEUTROPHILS # BLD AUTO: 5.36 K/UL — SIGNIFICANT CHANGE UP (ref 1.8–7.4)
NEUTROPHILS NFR BLD AUTO: 62.3 % — SIGNIFICANT CHANGE UP (ref 43–77)
NITRITE UR-MCNC: NEGATIVE — SIGNIFICANT CHANGE UP
NRBC # BLD: 0 /100 WBCS — SIGNIFICANT CHANGE UP (ref 0–0)
PH UR: 7 — SIGNIFICANT CHANGE UP (ref 5–8)
PLATELET # BLD AUTO: 395 K/UL — SIGNIFICANT CHANGE UP (ref 150–400)
POTASSIUM SERPL-MCNC: 3.3 MMOL/L — LOW (ref 3.5–5.3)
POTASSIUM SERPL-SCNC: 3.3 MMOL/L — LOW (ref 3.5–5.3)
PROT SERPL-MCNC: 7.8 GM/DL — SIGNIFICANT CHANGE UP (ref 6–8.3)
PROT UR-MCNC: 15 MG/DL
PROTHROM AB SERPL-ACNC: 12.3 SEC — SIGNIFICANT CHANGE UP (ref 9.8–12.7)
RBC # BLD: 4.18 M/UL — LOW (ref 4.2–5.8)
RBC # FLD: 16 % — HIGH (ref 10.3–14.5)
SODIUM SERPL-SCNC: 136 MMOL/L — SIGNIFICANT CHANGE UP (ref 135–145)
SP GR SPEC: 1.01 — SIGNIFICANT CHANGE UP (ref 1.01–1.02)
TROPONIN I SERPL-MCNC: <.015 NG/ML — SIGNIFICANT CHANGE UP (ref 0.01–0.04)
TROPONIN I SERPL-MCNC: <.015 NG/ML — SIGNIFICANT CHANGE UP (ref 0.01–0.04)
UROBILINOGEN FLD QL: NEGATIVE MG/DL — SIGNIFICANT CHANGE UP
WBC # BLD: 8.6 K/UL — SIGNIFICANT CHANGE UP (ref 3.8–10.5)
WBC # FLD AUTO: 8.6 K/UL — SIGNIFICANT CHANGE UP (ref 3.8–10.5)
WBC UR QL: SIGNIFICANT CHANGE UP

## 2018-03-19 PROCEDURE — 71045 X-RAY EXAM CHEST 1 VIEW: CPT | Mod: 26

## 2018-03-19 PROCEDURE — 99285 EMERGENCY DEPT VISIT HI MDM: CPT | Mod: 25

## 2018-03-19 PROCEDURE — 71275 CT ANGIOGRAPHY CHEST: CPT | Mod: 26

## 2018-03-19 PROCEDURE — 93010 ELECTROCARDIOGRAM REPORT: CPT

## 2018-03-19 PROCEDURE — 93971 EXTREMITY STUDY: CPT | Mod: 26,RT

## 2018-03-19 RX ORDER — AZITHROMYCIN 500 MG/1
500 TABLET, FILM COATED ORAL ONCE
Qty: 0 | Refills: 0 | Status: COMPLETED | OUTPATIENT
Start: 2018-03-19 | End: 2018-03-19

## 2018-03-19 RX ORDER — IPRATROPIUM/ALBUTEROL SULFATE 18-103MCG
3 AEROSOL WITH ADAPTER (GRAM) INHALATION
Qty: 0 | Refills: 0 | Status: COMPLETED | OUTPATIENT
Start: 2018-03-19 | End: 2018-03-19

## 2018-03-19 RX ORDER — ALBUTEROL 90 UG/1
2 AEROSOL, METERED ORAL
Qty: 1 | Refills: 0 | OUTPATIENT
Start: 2018-03-19 | End: 2018-04-17

## 2018-03-19 RX ORDER — SODIUM CHLORIDE 9 MG/ML
3 INJECTION INTRAMUSCULAR; INTRAVENOUS; SUBCUTANEOUS ONCE
Qty: 0 | Refills: 0 | Status: COMPLETED | OUTPATIENT
Start: 2018-03-19 | End: 2018-03-19

## 2018-03-19 RX ORDER — IBUPROFEN 200 MG
1 TABLET ORAL
Qty: 20 | Refills: 0 | OUTPATIENT
Start: 2018-03-19 | End: 2018-03-23

## 2018-03-19 RX ORDER — AZITHROMYCIN 500 MG/1
1 TABLET, FILM COATED ORAL
Qty: 4 | Refills: 0 | OUTPATIENT
Start: 2018-03-19 | End: 2018-03-22

## 2018-03-19 RX ORDER — ALBUTEROL 90 UG/1
2 AEROSOL, METERED ORAL ONCE
Qty: 0 | Refills: 0 | Status: COMPLETED | OUTPATIENT
Start: 2018-03-19 | End: 2018-03-19

## 2018-03-19 RX ORDER — SODIUM CHLORIDE 9 MG/ML
1000 INJECTION INTRAMUSCULAR; INTRAVENOUS; SUBCUTANEOUS ONCE
Qty: 0 | Refills: 0 | Status: COMPLETED | OUTPATIENT
Start: 2018-03-19 | End: 2018-03-19

## 2018-03-19 RX ADMIN — ALBUTEROL 2 PUFF(S): 90 AEROSOL, METERED ORAL at 13:53

## 2018-03-19 RX ADMIN — SODIUM CHLORIDE 1000 MILLILITER(S): 9 INJECTION INTRAMUSCULAR; INTRAVENOUS; SUBCUTANEOUS at 12:55

## 2018-03-19 RX ADMIN — AZITHROMYCIN 500 MILLIGRAM(S): 500 TABLET, FILM COATED ORAL at 12:55

## 2018-03-19 RX ADMIN — Medication 3 MILLILITER(S): at 05:51

## 2018-03-19 RX ADMIN — SODIUM CHLORIDE 3 MILLILITER(S): 9 INJECTION INTRAMUSCULAR; INTRAVENOUS; SUBCUTANEOUS at 04:10

## 2018-03-19 RX ADMIN — Medication 125 MILLIGRAM(S): at 12:55

## 2018-03-19 RX ADMIN — Medication 3 MILLILITER(S): at 05:40

## 2018-03-19 RX ADMIN — Medication 50 MILLIGRAM(S): at 07:11

## 2018-03-19 RX ADMIN — Medication 3 MILLILITER(S): at 05:32

## 2018-03-19 NOTE — ED PROVIDER NOTE - MEDICAL DECISION MAKING DETAILS
pending imaging. Patient care transitioned to incoming team.  All decisions regarding the progression of care will be made at their discretion. pending imaging. Patient care transitioned to incoming team.  All decisions regarding the progression of care will be made at their discretion.    CK persistent elevation but pt without body aches, NS 1L did not go in as line was temperamental, no change  otherwise given azithromycin, albuterol and prednisone for bronchitis and PMD follow up given.

## 2018-03-19 NOTE — ED PROVIDER NOTE - OBJECTIVE STATEMENT
Pertinent PMH/PSH/FHx/SHx and Review of Systems contained within:  58 yo m with PMH of COPD and HTN presents in ED c/o subjective fevers, nasal congestion, sob, wheezing, cough, chest pain and rle swelling. No aggravating or relieving factors, No fever/chills, No photophobia/eye pain/changes in vision, No ear pain/sore throat/dysphagia, No palpitations, no stridor, No abdominal pain, No N/V/D, no dysuria/frequency/discharge, No neck/back pain, no rash, no changes in neurological status/function.

## 2018-03-19 NOTE — ED ADULT NURSE NOTE - ED STAT RN HANDOFF DETAILS
Report given to Meri,  very hard tick , unable to get a good line even after a few attempts by Md Mae

## 2018-03-19 NOTE — ED ADULT NURSE REASSESSMENT NOTE - NS ED NURSE REASSESS COMMENT FT1
This nurse taking report from ongoing nurse Lilly, stating patients IV lines are not flushing/not working. MD Mae aware.

## 2018-03-19 NOTE — ED ADULT TRIAGE NOTE - SPO2 (%)
Patient presents ambulatory to the ED with complaint of generalized abdominal pain, anorexia and diarrhea for several weeks. Patient reports stool is at times bright red and others tarry and dark. Patient in no acute distress. 96

## 2018-03-20 LAB
CULTURE RESULTS: SIGNIFICANT CHANGE UP
SPECIMEN SOURCE: SIGNIFICANT CHANGE UP

## 2018-03-24 LAB
CULTURE RESULTS: SIGNIFICANT CHANGE UP
CULTURE RESULTS: SIGNIFICANT CHANGE UP
SPECIMEN SOURCE: SIGNIFICANT CHANGE UP
SPECIMEN SOURCE: SIGNIFICANT CHANGE UP

## 2018-10-04 ENCOUNTER — EMERGENCY (EMERGENCY)
Facility: HOSPITAL | Age: 58
LOS: 1 days | Discharge: HOME | End: 2018-10-04
Attending: EMERGENCY MEDICINE | Admitting: EMERGENCY MEDICINE
Payer: MEDICAID

## 2018-10-04 VITALS
SYSTOLIC BLOOD PRESSURE: 159 MMHG | TEMPERATURE: 99 F | OXYGEN SATURATION: 98 % | DIASTOLIC BLOOD PRESSURE: 88 MMHG | RESPIRATION RATE: 18 BRPM | HEART RATE: 104 BPM

## 2018-10-04 VITALS
RESPIRATION RATE: 19 BRPM | SYSTOLIC BLOOD PRESSURE: 149 MMHG | OXYGEN SATURATION: 97 % | DIASTOLIC BLOOD PRESSURE: 79 MMHG | HEART RATE: 88 BPM | TEMPERATURE: 98 F

## 2018-10-04 DIAGNOSIS — E78.5 HYPERLIPIDEMIA, UNSPECIFIED: ICD-10-CM

## 2018-10-04 DIAGNOSIS — E11.9 TYPE 2 DIABETES MELLITUS WITHOUT COMPLICATIONS: ICD-10-CM

## 2018-10-04 DIAGNOSIS — F10.10 ALCOHOL ABUSE, UNCOMPLICATED: ICD-10-CM

## 2018-10-04 DIAGNOSIS — R06.02 SHORTNESS OF BREATH: ICD-10-CM

## 2018-10-04 DIAGNOSIS — Z79.1 LONG TERM (CURRENT) USE OF NON-STEROIDAL ANTI-INFLAMMATORIES (NSAID): ICD-10-CM

## 2018-10-04 DIAGNOSIS — I10 ESSENTIAL (PRIMARY) HYPERTENSION: ICD-10-CM

## 2018-10-04 DIAGNOSIS — F17.200 NICOTINE DEPENDENCE, UNSPECIFIED, UNCOMPLICATED: ICD-10-CM

## 2018-10-04 DIAGNOSIS — Z79.52 LONG TERM (CURRENT) USE OF SYSTEMIC STEROIDS: ICD-10-CM

## 2018-10-04 DIAGNOSIS — Z79.899 OTHER LONG TERM (CURRENT) DRUG THERAPY: ICD-10-CM

## 2018-10-04 DIAGNOSIS — Z98.890 OTHER SPECIFIED POSTPROCEDURAL STATES: Chronic | ICD-10-CM

## 2018-10-04 DIAGNOSIS — J45.901 UNSPECIFIED ASTHMA WITH (ACUTE) EXACERBATION: ICD-10-CM

## 2018-10-04 LAB
ALBUMIN SERPL ELPH-MCNC: 3.4 G/DL — SIGNIFICANT CHANGE UP (ref 3.4–5)
ALP SERPL-CCNC: 107 U/L — SIGNIFICANT CHANGE UP (ref 40–120)
ALT FLD-CCNC: 36 U/L — SIGNIFICANT CHANGE UP (ref 12–42)
AMYLASE P1 CFR SERPL: 95 U/L — SIGNIFICANT CHANGE UP (ref 25–115)
ANION GAP SERPL CALC-SCNC: 5 MMOL/L — LOW (ref 9–16)
APPEARANCE UR: CLEAR — SIGNIFICANT CHANGE UP
APTT BLD: 33.2 SEC — SIGNIFICANT CHANGE UP (ref 27.5–36.5)
AST SERPL-CCNC: 92 U/L — HIGH (ref 15–37)
BILIRUB SERPL-MCNC: 0.6 MG/DL — SIGNIFICANT CHANGE UP (ref 0.2–1.2)
BILIRUB UR-MCNC: NEGATIVE — SIGNIFICANT CHANGE UP
BUN SERPL-MCNC: 14 MG/DL — SIGNIFICANT CHANGE UP (ref 7–23)
CALCIUM SERPL-MCNC: 8.7 MG/DL — SIGNIFICANT CHANGE UP (ref 8.5–10.5)
CHLORIDE SERPL-SCNC: 101 MMOL/L — SIGNIFICANT CHANGE UP (ref 96–108)
CO2 SERPL-SCNC: 28 MMOL/L — SIGNIFICANT CHANGE UP (ref 22–31)
COLOR SPEC: YELLOW — SIGNIFICANT CHANGE UP
CREAT SERPL-MCNC: 0.73 MG/DL — SIGNIFICANT CHANGE UP (ref 0.5–1.3)
DIFF PNL FLD: NEGATIVE — SIGNIFICANT CHANGE UP
ETHANOL SERPL-MCNC: <3 MG/DL — SIGNIFICANT CHANGE UP
GLUCOSE SERPL-MCNC: 112 MG/DL — HIGH (ref 70–99)
GLUCOSE UR QL: NEGATIVE — SIGNIFICANT CHANGE UP
HCT VFR BLD CALC: 41.3 % — SIGNIFICANT CHANGE UP (ref 39–50)
HGB BLD-MCNC: 13.5 G/DL — SIGNIFICANT CHANGE UP (ref 13–17)
INR BLD: 0.96 — SIGNIFICANT CHANGE UP (ref 0.88–1.16)
KETONES UR-MCNC: NEGATIVE — SIGNIFICANT CHANGE UP
LACTATE SERPL-SCNC: 1.7 MMOL/L — SIGNIFICANT CHANGE UP (ref 0.4–2)
LACTATE SERPL-SCNC: 2.1 MMOL/L — HIGH (ref 0.4–2)
LEUKOCYTE ESTERASE UR-ACNC: NEGATIVE — SIGNIFICANT CHANGE UP
LIDOCAIN IGE QN: 111 U/L — SIGNIFICANT CHANGE UP (ref 73–393)
MCHC RBC-ENTMCNC: 29.6 PG — SIGNIFICANT CHANGE UP (ref 27–34)
MCHC RBC-ENTMCNC: 32.7 G/DL — SIGNIFICANT CHANGE UP (ref 32–36)
MCV RBC AUTO: 90.6 FL — SIGNIFICANT CHANGE UP (ref 80–100)
NITRITE UR-MCNC: NEGATIVE — SIGNIFICANT CHANGE UP
NT-PROBNP SERPL-SCNC: 64 PG/ML — SIGNIFICANT CHANGE UP
PCP SPEC-MCNC: SIGNIFICANT CHANGE UP
PH UR: 5.5 — SIGNIFICANT CHANGE UP (ref 5–8)
PLATELET # BLD AUTO: 333 K/UL — SIGNIFICANT CHANGE UP (ref 150–400)
POTASSIUM SERPL-MCNC: 3.9 MMOL/L — SIGNIFICANT CHANGE UP (ref 3.5–5.3)
POTASSIUM SERPL-MCNC: SIGNIFICANT CHANGE UP MMOL/L (ref 3.5–5.3)
POTASSIUM SERPL-SCNC: 3.9 MMOL/L — SIGNIFICANT CHANGE UP (ref 3.5–5.3)
POTASSIUM SERPL-SCNC: SIGNIFICANT CHANGE UP MMOL/L (ref 3.5–5.3)
PROT SERPL-MCNC: 9.3 G/DL — HIGH (ref 6.4–8.2)
PROT UR-MCNC: NEGATIVE MG/DL — SIGNIFICANT CHANGE UP
PROTHROM AB SERPL-ACNC: 10.5 SEC — SIGNIFICANT CHANGE UP (ref 9.8–12.7)
RBC # BLD: 4.56 M/UL — SIGNIFICANT CHANGE UP (ref 4.2–5.8)
RBC # FLD: 16.5 % — HIGH (ref 10.3–14.5)
SODIUM SERPL-SCNC: 134 MMOL/L — SIGNIFICANT CHANGE UP (ref 132–145)
SP GR SPEC: 1.02 — SIGNIFICANT CHANGE UP (ref 1–1.03)
UROBILINOGEN FLD QL: 0.2 E.U./DL — SIGNIFICANT CHANGE UP
WBC # BLD: 7.1 K/UL — SIGNIFICANT CHANGE UP (ref 3.8–10.5)
WBC # FLD AUTO: 7.1 K/UL — SIGNIFICANT CHANGE UP (ref 3.8–10.5)

## 2018-10-04 PROCEDURE — 99285 EMERGENCY DEPT VISIT HI MDM: CPT | Mod: 25

## 2018-10-04 PROCEDURE — 71046 X-RAY EXAM CHEST 2 VIEWS: CPT | Mod: 26

## 2018-10-04 PROCEDURE — 74176 CT ABD & PELVIS W/O CONTRAST: CPT | Mod: 26

## 2018-10-04 PROCEDURE — 71250 CT THORAX DX C-: CPT | Mod: 26

## 2018-10-04 RX ORDER — IPRATROPIUM/ALBUTEROL SULFATE 18-103MCG
3 AEROSOL WITH ADAPTER (GRAM) INHALATION ONCE
Qty: 0 | Refills: 0 | Status: COMPLETED | OUTPATIENT
Start: 2018-10-04 | End: 2018-10-04

## 2018-10-04 RX ORDER — ALBUTEROL 90 UG/1
2 AEROSOL, METERED ORAL
Qty: 1 | Refills: 0 | OUTPATIENT
Start: 2018-10-04 | End: 2018-11-02

## 2018-10-04 RX ORDER — SODIUM CHLORIDE 9 MG/ML
1000 INJECTION INTRAMUSCULAR; INTRAVENOUS; SUBCUTANEOUS ONCE
Qty: 0 | Refills: 0 | Status: COMPLETED | OUTPATIENT
Start: 2018-10-04 | End: 2018-10-04

## 2018-10-04 RX ORDER — IPRATROPIUM BROMIDE 0.2 MG/ML
500 SOLUTION, NON-ORAL INHALATION ONCE
Qty: 0 | Refills: 0 | Status: COMPLETED | OUTPATIENT
Start: 2018-10-04 | End: 2018-10-04

## 2018-10-04 RX ORDER — AZITHROMYCIN 500 MG/1
500 TABLET, FILM COATED ORAL ONCE
Qty: 0 | Refills: 0 | Status: COMPLETED | OUTPATIENT
Start: 2018-10-04 | End: 2018-10-04

## 2018-10-04 RX ORDER — CEFTRIAXONE 500 MG/1
1 INJECTION, POWDER, FOR SOLUTION INTRAMUSCULAR; INTRAVENOUS ONCE
Qty: 0 | Refills: 0 | Status: COMPLETED | OUTPATIENT
Start: 2018-10-04 | End: 2018-10-04

## 2018-10-04 RX ORDER — ALBUTEROL 90 UG/1
2.5 AEROSOL, METERED ORAL
Qty: 0 | Refills: 0 | Status: COMPLETED | OUTPATIENT
Start: 2018-10-04 | End: 2018-10-04

## 2018-10-04 RX ORDER — AZITHROMYCIN 500 MG/1
1 TABLET, FILM COATED ORAL
Qty: 4 | Refills: 0 | OUTPATIENT
Start: 2018-10-04 | End: 2018-10-07

## 2018-10-04 RX ORDER — KETOROLAC TROMETHAMINE 30 MG/ML
30 SYRINGE (ML) INJECTION ONCE
Qty: 0 | Refills: 0 | Status: DISCONTINUED | OUTPATIENT
Start: 2018-10-04 | End: 2018-10-04

## 2018-10-04 RX ORDER — BUDESONIDE AND FORMOTEROL FUMARATE DIHYDRATE 160; 4.5 UG/1; UG/1
2 AEROSOL RESPIRATORY (INHALATION)
Qty: 1 | Refills: 0
Start: 2018-10-04 | End: 2018-11-02

## 2018-10-04 RX ORDER — ACETAMINOPHEN 500 MG
975 TABLET ORAL ONCE
Qty: 0 | Refills: 0 | Status: COMPLETED | OUTPATIENT
Start: 2018-10-04 | End: 2018-10-04

## 2018-10-04 RX ORDER — SODIUM CHLORIDE 9 MG/ML
1000 INJECTION, SOLUTION INTRAVENOUS
Qty: 0 | Refills: 0 | Status: DISCONTINUED | OUTPATIENT
Start: 2018-10-04 | End: 2018-10-08

## 2018-10-04 RX ORDER — MORPHINE SULFATE 50 MG/1
4 CAPSULE, EXTENDED RELEASE ORAL ONCE
Qty: 0 | Refills: 0 | Status: DISCONTINUED | OUTPATIENT
Start: 2018-10-04 | End: 2018-10-04

## 2018-10-04 RX ORDER — ALBUTEROL 90 UG/1
2.5 AEROSOL, METERED ORAL ONCE
Qty: 0 | Refills: 0 | Status: COMPLETED | OUTPATIENT
Start: 2018-10-04 | End: 2018-10-04

## 2018-10-04 RX ADMIN — SODIUM CHLORIDE 1000 MILLILITER(S): 9 INJECTION INTRAMUSCULAR; INTRAVENOUS; SUBCUTANEOUS at 06:23

## 2018-10-04 RX ADMIN — ALBUTEROL 2.5 MILLIGRAM(S): 90 AEROSOL, METERED ORAL at 09:06

## 2018-10-04 RX ADMIN — Medication 30 MILLILITER(S): at 06:44

## 2018-10-04 RX ADMIN — Medication 500 MICROGRAM(S): at 05:21

## 2018-10-04 RX ADMIN — ALBUTEROL 2.5 MILLIGRAM(S): 90 AEROSOL, METERED ORAL at 05:22

## 2018-10-04 RX ADMIN — Medication 3 MILLILITER(S): at 04:33

## 2018-10-04 RX ADMIN — Medication 30 MILLIGRAM(S): at 10:27

## 2018-10-04 RX ADMIN — MORPHINE SULFATE 4 MILLIGRAM(S): 50 CAPSULE, EXTENDED RELEASE ORAL at 06:08

## 2018-10-04 RX ADMIN — SODIUM CHLORIDE 500 MILLILITER(S): 9 INJECTION INTRAMUSCULAR; INTRAVENOUS; SUBCUTANEOUS at 05:22

## 2018-10-04 RX ADMIN — SODIUM CHLORIDE 1000 MILLILITER(S): 9 INJECTION, SOLUTION INTRAVENOUS at 07:44

## 2018-10-04 RX ADMIN — Medication 125 MILLIGRAM(S): at 05:21

## 2018-10-04 RX ADMIN — CEFTRIAXONE 100 GRAM(S): 500 INJECTION, POWDER, FOR SOLUTION INTRAMUSCULAR; INTRAVENOUS at 05:22

## 2018-10-04 RX ADMIN — AZITHROMYCIN 255 MILLIGRAM(S): 500 TABLET, FILM COATED ORAL at 06:08

## 2018-10-04 RX ADMIN — Medication 975 MILLIGRAM(S): at 10:27

## 2018-10-04 RX ADMIN — ALBUTEROL 2.5 MILLIGRAM(S): 90 AEROSOL, METERED ORAL at 05:21

## 2018-10-04 RX ADMIN — SODIUM CHLORIDE 1000 MILLILITER(S): 9 INJECTION, SOLUTION INTRAVENOUS at 06:44

## 2018-10-04 RX ADMIN — Medication 25 MILLIGRAM(S): at 06:23

## 2018-10-04 RX ADMIN — ALBUTEROL 2.5 MILLIGRAM(S): 90 AEROSOL, METERED ORAL at 06:09

## 2018-10-04 RX ADMIN — Medication 25 MILLIGRAM(S): at 05:21

## 2018-10-04 NOTE — ED PROVIDER NOTE - CARE PLAN
Principal Discharge DX:	Acute asthma exacerbation  Secondary Diagnosis:	AA (alcohol abuse)  Secondary Diagnosis:	DM (diabetes mellitus)

## 2018-10-04 NOTE — ED PROVIDER NOTE - MEDICAL DECISION MAKING DETAILS
short of breath, alcohol withdrawal, asthma, cough, abd pain, will order labs, CT, lipase, lactate, Librium, nebs, and reassess, will admit to CDU

## 2018-10-04 NOTE — ED PROVIDER NOTE - OBJECTIVE STATEMENT
short of breath, alcohol withdrawal, asthma, cough, abd pain, will order labs, CT, lipase, lactate, Librium, nebs, and reassess

## 2018-10-04 NOTE — ED BEHAVIORAL HEALTH NOTE - BEHAVIORAL HEALTH NOTE
Sw was consulted to the Ed to speak with the patient. The patient is a 57 year old male currently residing at the [facility] on [address] Per the provider the patient was brought over to this ER from [place] last night to be seen and treated. Per the conservation with the patient he just got to the [facility] last night and was sent to the Er to be treated as he was feeling un well, but he plans on returning there with his d/c paperwork when he is discharged. The patient also has A CM with [facility] ([phone]), a substance abuse team who is working with him on trying to get him his permanent housing. The patient asked that they be notified as to his location as he was concerned  that he did not know where he was. The team was contacted and told where he was. The patient was notified that they were aware and he was grateful. Worker made available for any further assistance needed.

## 2018-10-04 NOTE — ED PROVIDER NOTE - PROGRESS NOTE DETAILS
Breathing better, CT chest stable. Will need fu (will give copy of report). Will d/c on Azithro/Pred. Going back to [facility].

## 2018-10-04 NOTE — ED ADULT TRIAGE NOTE - CHIEF COMPLAINT QUOTE
Pt brought in by EMS for SOB, cough and yellow phlegm X 2 days. Requesting nebulizer treatments and steroids. States history of asthma. Pt also complains of upper abdominal pain with nausea and vomiting.

## 2018-10-04 NOTE — ED PROVIDER NOTE - SEVERE SEPSIS ALERT DETAILS
Patient afebrile, IV fluids and antibiotics given because crackles heard and no wheeze on ausultation. CXR and lactic acid and blood cultures sent.  Patient here for abdominal pain, asthma and alcohol withdrawal tremor.  CT abdomen ordered.

## 2018-10-04 NOTE — SBIRT NOTE. - NSSBIRTSERVICES_GEN_A_ED_FT
Provided SBIRT services. Full Screen Positive. Brief Intervention Performed. Screening results were reviewed with the patient and patient was provided information about healthy guidelines and potential negative consequences associated with level of risk.   Motivation and readiness to reduce or stop use was discussed and goals and activities to make changes were suggested/offered.    Audit Score :12    Dast Score :0    Duration : 15 minutes

## 2018-10-04 NOTE — ED ADULT NURSE REASSESSMENT NOTE - NS ED NURSE REASSESS COMMENT FT1
received pt from night shift RN. pt awaiting radiology result. pt in NAD and will continue to monitor.

## 2018-10-05 ENCOUNTER — EMERGENCY (EMERGENCY)
Facility: HOSPITAL | Age: 58
LOS: 1 days | Discharge: HOME | End: 2018-10-05
Attending: EMERGENCY MEDICINE | Admitting: EMERGENCY MEDICINE
Payer: MEDICAID

## 2018-10-05 VITALS
HEART RATE: 118 BPM | RESPIRATION RATE: 18 BRPM | OXYGEN SATURATION: 94 % | SYSTOLIC BLOOD PRESSURE: 136 MMHG | TEMPERATURE: 98 F | DIASTOLIC BLOOD PRESSURE: 75 MMHG

## 2018-10-05 DIAGNOSIS — Z79.1 LONG TERM (CURRENT) USE OF NON-STEROIDAL ANTI-INFLAMMATORIES (NSAID): ICD-10-CM

## 2018-10-05 DIAGNOSIS — Z79.52 LONG TERM (CURRENT) USE OF SYSTEMIC STEROIDS: ICD-10-CM

## 2018-10-05 DIAGNOSIS — R10.84 GENERALIZED ABDOMINAL PAIN: ICD-10-CM

## 2018-10-05 DIAGNOSIS — E78.5 HYPERLIPIDEMIA, UNSPECIFIED: ICD-10-CM

## 2018-10-05 DIAGNOSIS — Z98.890 OTHER SPECIFIED POSTPROCEDURAL STATES: Chronic | ICD-10-CM

## 2018-10-05 DIAGNOSIS — E11.9 TYPE 2 DIABETES MELLITUS WITHOUT COMPLICATIONS: ICD-10-CM

## 2018-10-05 DIAGNOSIS — Z79.2 LONG TERM (CURRENT) USE OF ANTIBIOTICS: ICD-10-CM

## 2018-10-05 DIAGNOSIS — Z79.899 OTHER LONG TERM (CURRENT) DRUG THERAPY: ICD-10-CM

## 2018-10-05 DIAGNOSIS — I10 ESSENTIAL (PRIMARY) HYPERTENSION: ICD-10-CM

## 2018-10-05 DIAGNOSIS — R11.2 NAUSEA WITH VOMITING, UNSPECIFIED: ICD-10-CM

## 2018-10-05 PROCEDURE — 99285 EMERGENCY DEPT VISIT HI MDM: CPT | Mod: 25

## 2018-10-05 PROCEDURE — 93010 ELECTROCARDIOGRAM REPORT: CPT

## 2018-10-06 VITALS
SYSTOLIC BLOOD PRESSURE: 134 MMHG | OXYGEN SATURATION: 96 % | TEMPERATURE: 98 F | HEART RATE: 88 BPM | RESPIRATION RATE: 18 BRPM | DIASTOLIC BLOOD PRESSURE: 78 MMHG

## 2018-10-06 LAB
ALBUMIN SERPL ELPH-MCNC: 3 G/DL — LOW (ref 3.4–5)
ALP SERPL-CCNC: 77 U/L — SIGNIFICANT CHANGE UP (ref 40–120)
ALT FLD-CCNC: 26 U/L — SIGNIFICANT CHANGE UP (ref 12–42)
ANION GAP SERPL CALC-SCNC: 11 MMOL/L — SIGNIFICANT CHANGE UP (ref 9–16)
AST SERPL-CCNC: 24 U/L — SIGNIFICANT CHANGE UP (ref 15–37)
BASOPHILS NFR BLD AUTO: 1 % — SIGNIFICANT CHANGE UP (ref 0–2)
BILIRUB SERPL-MCNC: 0.1 MG/DL — LOW (ref 0.2–1.2)
BUN SERPL-MCNC: 12 MG/DL — SIGNIFICANT CHANGE UP (ref 7–23)
CALCIUM SERPL-MCNC: 8.2 MG/DL — LOW (ref 8.5–10.5)
CHLORIDE SERPL-SCNC: 106 MMOL/L — SIGNIFICANT CHANGE UP (ref 96–108)
CO2 SERPL-SCNC: 27 MMOL/L — SIGNIFICANT CHANGE UP (ref 22–31)
CREAT SERPL-MCNC: 0.8 MG/DL — SIGNIFICANT CHANGE UP (ref 0.5–1.3)
EOSINOPHIL NFR BLD AUTO: 1.1 % — SIGNIFICANT CHANGE UP (ref 0–6)
ETHANOL SERPL-MCNC: 82 MG/DL — HIGH
GLUCOSE SERPL-MCNC: 113 MG/DL — HIGH (ref 70–99)
HCT VFR BLD CALC: 37.1 % — LOW (ref 39–50)
HGB BLD-MCNC: 12.3 G/DL — LOW (ref 13–17)
IMM GRANULOCYTES NFR BLD AUTO: 0.4 % — SIGNIFICANT CHANGE UP (ref 0–1.5)
LIDOCAIN IGE QN: 108 U/L — SIGNIFICANT CHANGE UP (ref 73–393)
LYMPHOCYTES # BLD AUTO: 43.9 % — SIGNIFICANT CHANGE UP (ref 13–44)
MCHC RBC-ENTMCNC: 29.7 PG — SIGNIFICANT CHANGE UP (ref 27–34)
MCHC RBC-ENTMCNC: 33.2 G/DL — SIGNIFICANT CHANGE UP (ref 32–36)
MCV RBC AUTO: 89.6 FL — SIGNIFICANT CHANGE UP (ref 80–100)
MONOCYTES NFR BLD AUTO: 8.2 % — SIGNIFICANT CHANGE UP (ref 2–14)
NEUTROPHILS NFR BLD AUTO: 45.4 % — SIGNIFICANT CHANGE UP (ref 43–77)
PLATELET # BLD AUTO: 302 K/UL — SIGNIFICANT CHANGE UP (ref 150–400)
POTASSIUM SERPL-MCNC: 3.2 MMOL/L — LOW (ref 3.5–5.3)
POTASSIUM SERPL-SCNC: 3.2 MMOL/L — LOW (ref 3.5–5.3)
PROT SERPL-MCNC: 7.1 G/DL — SIGNIFICANT CHANGE UP (ref 6.4–8.2)
RBC # BLD: 4.14 M/UL — LOW (ref 4.2–5.8)
RBC # FLD: 16.6 % — HIGH (ref 10.3–14.5)
SODIUM SERPL-SCNC: 144 MMOL/L — SIGNIFICANT CHANGE UP (ref 132–145)
TROPONIN I SERPL-MCNC: <0.017 NG/ML — LOW (ref 0.02–0.06)
WBC # BLD: 5.3 K/UL — SIGNIFICANT CHANGE UP (ref 3.8–10.5)
WBC # FLD AUTO: 5.3 K/UL — SIGNIFICANT CHANGE UP (ref 3.8–10.5)

## 2018-10-06 PROCEDURE — 74176 CT ABD & PELVIS W/O CONTRAST: CPT | Mod: 26

## 2018-10-06 RX ORDER — SODIUM CHLORIDE 9 MG/ML
1000 INJECTION INTRAMUSCULAR; INTRAVENOUS; SUBCUTANEOUS ONCE
Qty: 0 | Refills: 0 | Status: DISCONTINUED | OUTPATIENT
Start: 2018-10-06 | End: 2018-10-06

## 2018-10-06 RX ORDER — IOHEXOL 300 MG/ML
30 INJECTION, SOLUTION INTRAVENOUS ONCE
Qty: 0 | Refills: 0 | Status: COMPLETED | OUTPATIENT
Start: 2018-10-06 | End: 2018-10-06

## 2018-10-06 RX ORDER — ONDANSETRON 8 MG/1
4 TABLET, FILM COATED ORAL ONCE
Qty: 0 | Refills: 0 | Status: DISCONTINUED | OUTPATIENT
Start: 2018-10-06 | End: 2018-10-06

## 2018-10-06 RX ORDER — MORPHINE SULFATE 50 MG/1
2 CAPSULE, EXTENDED RELEASE ORAL ONCE
Qty: 0 | Refills: 0 | Status: COMPLETED | OUTPATIENT
Start: 2018-10-06 | End: 2018-10-06

## 2018-10-06 RX ORDER — ONDANSETRON 8 MG/1
4 TABLET, FILM COATED ORAL ONCE
Qty: 0 | Refills: 0 | Status: COMPLETED | OUTPATIENT
Start: 2018-10-06 | End: 2018-10-06

## 2018-10-06 RX ORDER — MORPHINE SULFATE 50 MG/1
2 CAPSULE, EXTENDED RELEASE ORAL ONCE
Qty: 0 | Refills: 0 | Status: DISCONTINUED | OUTPATIENT
Start: 2018-10-06 | End: 2018-10-06

## 2018-10-06 RX ADMIN — ONDANSETRON 4 MILLIGRAM(S): 8 TABLET, FILM COATED ORAL at 04:16

## 2018-10-06 RX ADMIN — IOHEXOL 30 MILLILITER(S): 300 INJECTION, SOLUTION INTRAVENOUS at 04:16

## 2018-10-06 RX ADMIN — MORPHINE SULFATE 2 MILLIGRAM(S): 50 CAPSULE, EXTENDED RELEASE ORAL at 04:17

## 2018-10-06 NOTE — ED PROVIDER NOTE - CARE PROVIDER_API CALL
Garth Valiente), Gastroenterology  [address] [address]  Phone: [phone]  Fax: [phone]

## 2018-10-06 NOTE — ED ADULT NURSE NOTE - CAS EDN DISCHARGE ASSESSMENT
Alert and oriented to person, place and time/Awake/Symptoms improved/received pt from night shift RN, pt awaiting d/c, states he feels better and ready to go

## 2018-10-06 NOTE — ED PROVIDER NOTE - OBJECTIVE STATEMENT
58 y/o male pt, regula alcohol drinking, presents w abd pain, difuse w nausea, vomiting x 2. +alcohol driniking tonight. no chest pain, no sob, no fever, no chills

## 2018-10-06 NOTE — ED ADULT NURSE NOTE - OBJECTIVE STATEMENT
Pt is a 57y male complaining of abdominal pain and chest pain. Pt unable to recall when pain started. Pt states that he had chronic asthma and is feeling sob. Duonebs given. Pt speaking in full sentences at this time appears to be in no distress.  Pt admits to " drinking a lot of alcohol" today. Pt denies fever and chills.

## 2018-10-10 ENCOUNTER — EMERGENCY (EMERGENCY)
Facility: HOSPITAL | Age: 58
LOS: 1 days | Discharge: HOME | End: 2018-10-10
Attending: EMERGENCY MEDICINE | Admitting: EMERGENCY MEDICINE
Payer: MEDICAID

## 2018-10-10 VITALS
SYSTOLIC BLOOD PRESSURE: 160 MMHG | TEMPERATURE: 98 F | HEART RATE: 112 BPM | DIASTOLIC BLOOD PRESSURE: 80 MMHG | OXYGEN SATURATION: 96 % | RESPIRATION RATE: 22 BRPM

## 2018-10-10 VITALS
RESPIRATION RATE: 20 BRPM | OXYGEN SATURATION: 93 % | DIASTOLIC BLOOD PRESSURE: 69 MMHG | SYSTOLIC BLOOD PRESSURE: 129 MMHG | TEMPERATURE: 98 F | HEART RATE: 120 BPM

## 2018-10-10 DIAGNOSIS — F10.129 ALCOHOL ABUSE WITH INTOXICATION, UNSPECIFIED: ICD-10-CM

## 2018-10-10 DIAGNOSIS — Z79.2 LONG TERM (CURRENT) USE OF ANTIBIOTICS: ICD-10-CM

## 2018-10-10 DIAGNOSIS — J45.909 UNSPECIFIED ASTHMA, UNCOMPLICATED: ICD-10-CM

## 2018-10-10 DIAGNOSIS — Z79.52 LONG TERM (CURRENT) USE OF SYSTEMIC STEROIDS: ICD-10-CM

## 2018-10-10 DIAGNOSIS — Z79.899 OTHER LONG TERM (CURRENT) DRUG THERAPY: ICD-10-CM

## 2018-10-10 DIAGNOSIS — Z79.84 LONG TERM (CURRENT) USE OF ORAL HYPOGLYCEMIC DRUGS: ICD-10-CM

## 2018-10-10 DIAGNOSIS — Z98.890 OTHER SPECIFIED POSTPROCEDURAL STATES: Chronic | ICD-10-CM

## 2018-10-10 DIAGNOSIS — Z79.1 LONG TERM (CURRENT) USE OF NON-STEROIDAL ANTI-INFLAMMATORIES (NSAID): ICD-10-CM

## 2018-10-10 PROCEDURE — 99284 EMERGENCY DEPT VISIT MOD MDM: CPT | Mod: 25

## 2018-10-10 RX ORDER — IPRATROPIUM/ALBUTEROL SULFATE 18-103MCG
3 AEROSOL WITH ADAPTER (GRAM) INHALATION ONCE
Qty: 0 | Refills: 0 | Status: COMPLETED | OUTPATIENT
Start: 2018-10-10 | End: 2018-10-10

## 2018-10-10 RX ORDER — OXYMETAZOLINE HYDROCHLORIDE 0.5 MG/ML
2 SPRAY NASAL ONCE
Qty: 0 | Refills: 0 | Status: COMPLETED | OUTPATIENT
Start: 2018-10-10 | End: 2018-10-10

## 2018-10-10 RX ORDER — ALBUTEROL 90 UG/1
2 AEROSOL, METERED ORAL ONCE
Qty: 0 | Refills: 0 | Status: COMPLETED | OUTPATIENT
Start: 2018-10-10 | End: 2018-10-10

## 2018-10-10 RX ADMIN — ALBUTEROL 2 PUFF(S): 90 AEROSOL, METERED ORAL at 06:17

## 2018-10-10 RX ADMIN — Medication 3 MILLILITER(S): at 01:28

## 2018-10-10 RX ADMIN — Medication 50 MILLIGRAM(S): at 05:49

## 2018-10-10 RX ADMIN — Medication 3 MILLILITER(S): at 01:29

## 2018-10-10 RX ADMIN — OXYMETAZOLINE HYDROCHLORIDE 2 SPRAY(S): 0.5 SPRAY NASAL at 06:38

## 2018-10-10 RX ADMIN — Medication 60 MILLIGRAM(S): at 01:28

## 2018-10-10 NOTE — ED ADULT NURSE NOTE - NSIMPLEMENTINTERV_GEN_ALL_ED
Implemented All Universal Safety Interventions:  [place] to call system. Call bell, personal items and telephone within reach. Instruct patient to call for assistance. Room bathroom lighting operational. Non-slip footwear when patient is off stretcher. Physically safe environment: no spills, clutter or unnecessary equipment. Stretcher in lowest position, wheels locked, appropriate side rails in place.

## 2018-10-10 NOTE — ED ADULT NURSE NOTE - OBJECTIVE STATEMENT
Pt is a 57y male brought in by EMS for altered mental status. Pt admits to drinking alcohol.  Pt has history of copd and asthma. Pt has bilaterally wheezes. Pt denies chest pain, nausea, vomiting, fever, and chills.

## 2018-10-10 NOTE — ED ADULT NURSE NOTE - CHIEF COMPLAINT QUOTE
BIBA for etoh use from [facility]. Patient is awake alert oriented, no distress, no injury.

## 2018-10-10 NOTE — ED PROVIDER NOTE - UNABLE TO OBTAIN
patient arrives intoxicated, unable to provide a history or cooperate with physical exam. Urgent need for Intervention

## 2018-11-06 NOTE — H&P ADULT. - RS GEN HX ROS MEA POS PC
Pt remains a&oX4, resting comfortably in bed in no apparent distress at this time. VSS. Pt ambulated to bathroom without issue. Awaiting bed assignment. Pt remains on 3L NC and on cardiac monitor. Safety maintained. wheezing/sputum production/cough/dyspnea/pleuritic chest pain

## 2019-03-22 NOTE — PATIENT PROFILE ADULT. - URINARY CATHETER
I have seen this patient with a scribe. Please see documentation of my history and physical examination and plan. I agree with scribe documentation except as indicated in my notes.
no

## 2019-04-16 NOTE — ED ADULT NURSE NOTE - TEMPLATE
childhood series) 03/19/2008    HPV vaccine (2 - Female 2-dose series) 10/16/2019    DTaP/Tdap/Td vaccine (2 - Td) 05/14/2019    Flu vaccine (Season Ended) 09/01/2019    Meningococcal (ACWY) Vaccine (2 - 2-dose series) 03/19/2023    Pneumococcal 0-64 years Vaccine  Aged Out       Subjective:      Review of Systems   Constitutional: Negative for activity change, appetite change, fatigue and fever. HENT: Negative for congestion, ear pain, postnasal drip and rhinorrhea. Eyes: Negative for pain and discharge. Respiratory: Negative for apnea and shortness of breath. Cardiovascular: Negative for chest pain. Gastrointestinal: Negative for abdominal distention and constipation. Genitourinary: Negative for difficulty urinating, frequency and hematuria. Musculoskeletal: Negative for arthralgias and joint swelling. Skin: Negative for rash. Neurological: Negative for dizziness, speech difficulty and light-headedness. Hematological: Negative for adenopathy. Psychiatric/Behavioral: Negative for agitation and behavioral problems. Objective:     /70   Pulse 101   Ht 5' 3\" (1.6 m)   Wt 129 lb 12.8 oz (58.9 kg)   SpO2 98%   BMI 22.99 kg/m²   Physical Exam   Constitutional: She appears well-developed and well-nourished. HENT:   Mouth/Throat: Mucous membranes are moist.   Eyes: Pupils are equal, round, and reactive to light. Conjunctivae and EOM are normal.   Neck: Normal range of motion. Cardiovascular: Normal rate and regular rhythm. Pulmonary/Chest: Effort normal and breath sounds normal. There is normal air entry. Abdominal: Soft. Bowel sounds are normal. She exhibits no distension. There is no tenderness. Musculoskeletal: Normal range of motion. Neurological: She is alert. Skin: Skin is warm. Assessment:       Diagnosis Orders   1. Encounter for routine child health examination without abnormal findings     2.  Need for viral immunization  Tdap (age 10y-63y) IM (ADACEL)    Meningococcal MCV4P (age 7m-55y) IM (Menactra)    HPV Vaccine 9-valent IM        Plan:    adacel today  Menactra today  Gardasil today    Return in about 1 year (around 4/16/2020). Orders Placed This Encounter   Procedures    Tdap (age 10y-63y) IM (ADACEL)    Meningococcal MCV4P (age 7m-55y) IM (Menactra)    HPV Vaccine 9-valent IM     No orders of the defined types were placed in this encounter. Patient given educationalmaterials - see patient instructions. Discussed use, benefit, and side effectsof prescribed medications. All patient questions answered. Pt voiced understanding. Reviewed health maintenance. Instructed to continue current medications, diet andexercise. Patient agreed with treatment plan. Follow up as directed.      Electronicallysigned by Skyler Jackson MD on 4/17/2019 at 9:35 AM Respiratory

## 2019-06-06 NOTE — PRE-OP CHECKLIST - DENTURES
PA was never received.  Completed verbal PA for Brittany.  Approved from 5/7/19 to 6/5/22. Case ID#: [id number].  Copay $711.15.  Patient assistance is required.   no

## 2020-11-16 NOTE — ED ADULT NURSE NOTE - NEURO ASSESSMENT
Pt's wife will bring his CPAP to the waiting room desk.       Felicita Anthony RN  11/15/20 1761 WDL

## 2021-11-05 NOTE — ED ADULT TRIAGE NOTE - NS ED TRIAGE CLINICAL UPGRADE
Never Deteriorating patient status - Patient was clinically upgraded due to deteriorating patient status.

## 2022-03-21 NOTE — DISCHARGE NOTE ADULT - FUNCTIONAL SCREEN CURRENT LEVEL: BATHING, MLM
Lab Results   Component Value Date    WBC 6 76 12/09/2021    HGB 8 6 (L) 12/09/2021    HCT 31 2 (L) 12/09/2021    MCV 70 (L) 12/09/2021     12/09/2021     Repeat CBC, iron level  Restart iron supplement (0) independent

## 2022-09-20 NOTE — ED PROVIDER NOTE - CROS ED CARDIOVAS ALL NEG
Psychiatry Progress Note [facility] 56 y o  male MRN: [id number]  Unit/Bed#: RADHIKA OG [facility] 101-01 Encounter: [id number]  Code Status: Level 1 - Full Code    PCP: Raphael Paulino MD    Date of Admission:  4/1/2022 1127   Date of Service:  09/20/22    Patient Active Problem List   Diagnosis    Schizoaffective disorder ([facility] )    Hypothyroidism    HTN (hypertension)    Diabetes ([facility] )    Chest pain    Hypertriglyceridemia    Environmental allergies    Iron deficiency anemia    Gastroesophageal reflux disease    Abnormal CT of the chest    Type 2 diabetes mellitus without complication, without long-term current use of insulin (HCC)    Neuropathy    Acute metabolic encephalopathy    Acute kidney injury ([facility] )    Anemia    Thrombocytopenia (HCC)    Right ankle pain    Medical clearance for psychiatric admission    Vitamin D deficiency    External hemorrhoids    Right foot pain    Elevated CK     Review of systems:  Refused Topamax vitamin D3  and Januvia and also Seroquel  yesterday including Accu-Chek coverage for blood sugar 179 afternoon  otherwise unremarkable   Diagnosis:  Bipolar depressed  Assessment   Overall Status:  Remains in depressed phase of bipolar disorder being withdrawn isolated staying on bed not running around or making inappropriate comments and refusing certain medications like Topamax vitamin D3 Seroquel and Januvia not complying with insulin coverage for Accu-Chek being high   Certification Statement:  Will continue to require additional inpatient hospital stay due to ongoing rapid cycling with mood swings unless he maintains a period of stability at least for a month before he can be placed in a supervised setting with an act team    PLAN;   Medications:  Zyprexa 30 mg at bedtime for bipolar disorder,   lithium 900 mg at bedtime with, Seroquel  being tapered off ,and Tegretol XR 1600 mg a day also for bipolar depression, prozac 10 g a day , vraylar 1 5 mg po daily  Medication changes:  Topamax discontinued due to refuse Seroquel being tapered off due to refusal and lack of response and Vraylar added for bipolar depression   Medication Education : Risks, benefits precautions discussed with him including risk for suicidal thoughts including need for compliance with medications as prescribed, also reviewed need to drink enough fluids at least 2 litres a day while on Topamax   Justification for dual anti-psychotics: due to lack of response to sigle antipsychotics  Side effects from treatment: none  Understanding of medications:  Has some understanding  Non-pharmacological treatments  Continue with individual, group, milieu and occupational therapy using recovery principles and psycho-education about accepting illness and the need for treatment  Also counseled to take meds consiistently  Safety   Safety and communication plan established to target dynamic risk factors discussed above  Discharge Plan    To a supervised setting with ACT team again once mood is stabilized but due to lack of stability of affect and frequent highs and lows a referral being made to [address] to appear sad depressed withdrawn isolated with no good mood reactivity preferring to lay back on bed and not running around or making inappropriate sexual comments  He sleeps in too much and refusing certain medications including insulin coverage and Januvia    Not agitated threatening destructive was self-abusive on the unit hardly attending groups    Appetite:  Good  sleep:  Sleeping  Compliance with medication:  Compliant  Significant events:  Depressed  Group attendance :  Attending minimal 3/9, 60% last week   Acceptance by patient:  Not fully accepting  Hopefulness in recovery:  Living at a group home  Involved in reintegration process:  Talking with friends in community who speak his language   Trusting relationship with psychiatrist to some extent     Mental Status Exam  Appearance: casually dressed, dressed appropriately, adequate grooming attended team, feeling sad, wearing a facial mask,    Behavior: cooperative, mildly anxious appearing sad, friendly    Speech: normal rate and volume, fluent, coherent, loud, monotone  Mood: depressed, anxious     Affect: mood-congruent appropriate to thought content    Thought Process: organized, coherent, goal directed   Thought Content: no overt delusions, no current s/h thoughts intent or plans, no distorted body perceptions, no phobias or obsessions or compulsions    Perceptual Disturbances: no auditory hallucinations, no visual hallucinations not appearing to respond    Hx Risk Factors: chronic mood disorder  Sensorium:  Oriented to 3 spheres and to situation   Cognition: recent and remote memory grossly intact  Consciousness: alert and awake    Attention: attention span and concentration are age appropriate  Intellect: appears to be of average intelligence  Insight: improving  Judgement: limited  Motor Activity: no abnormal movements     Vitals  Temp:  [97 9 °F (36 6 °C)] 97 9 °F (36 6 °C)  HR:  [] 98  Resp:  [16-18] 16  BP: (145-154)/(89-99) 146/98  No intake or output data in the 24 hours ending 09/20/22 0516    Lab Results:  Tr[facility] Admission Reviewed      Current Facility-Administered Medications   Medication Dose Route Frequency Provider Last Rate    acetaminophen  650 mg Oral Q6H PRN [place] III, DO      acetaminophen  650 mg Oral Q4H PRN Marina [place] III, DO      acetaminophen  975 mg Oral Q6H PRN Marina [place] III, DO      aluminum-magnesium hydroxide-simethicone  30 mL Oral Q4H PRN Marina Evelin III, DO      ammonium lactate   Topical BID PRN Rosalie Galindo CRNP      atorvastatin  80 mg Oral QPM Marina [place] III, DO      haloperidol lactate  2 5 mg Intramuscular Q6H PRN Max 4/day Marina [place] III, DO      And    LORazepam  1 mg Intramuscular Q6H PRN Max 4/day Sarah Neil III, DO      And    benztropine  0 5 mg Intramuscular Q6H PRN Max 4/day Sarah Neil III, DO      haloperidol lactate  5 mg Intramuscular Q4H PRN Max 4/day Sarah Neil III, DO      And    LORazepam  2 mg Intramuscular Q4H PRN Max 4/day Sarah Neil III, DO      And    benztropine  1 mg Intramuscular Q4H PRN Max 4/day Sarah Neil III, DO      benztropine  1 mg Oral Q6H PRN Sarah Neil III, DO      carBAMazepine  800 mg Oral BID Amanuel Johnson MD      cariprazine  1 5 mg Oral Daily Amanuel Johnson MD      cholecalciferol  1,000 Units Oral Daily Sarah Neil III, DO      Diclofenac Sodium  2 g Topical 4x Daily PRN Oumou Reddish, JASMEET      glimepiride  4 mg Oral Daily With Breakfast MELECIO PettitC      haloperidol  2 mg Oral Q4H PRN Max 6/day Sarah Neil III, DO      haloperidol  5 mg Oral Q6H PRN Max 4/day Sarah Neil III, DO      haloperidol  5 mg Oral Q4H PRN Max 4/day Sarah Neil III, DO      hydrOXYzine HCL  100 mg Oral Q6H PRN Max 4/day Sarah Neil III, DO      hydrOXYzine HCL  50 mg Oral Q6H PRN Max 4/day Sarah Neil III, DO      insulin glargine  5 Units Subcutaneous HS [facility] Lucho, PA-C      insulin lispro  1-6 Units Subcutaneous HS Sarah Neil III, DO      insulin lispro  1-6 Units Subcutaneous TID AC Fanny Pancoast, PA-C      ketoconazole  1 application Topical Daily PRN Lanrigoe Javier, MURTAZA      levothyroxine  50 mcg Oral Early Morning Fanny Pancoast, PA-C      lidocaine  3 patch Topical Daily PRN Oumou Reddish, JASMEET      lithium carbonate  900 mg Oral HS Amanuel Johnson MD      LORazepam  0 5 mg Oral Q6H PRN LanMURTAZA Velez      Or    LORazepam  1 mg Intravenous Q6H PRN Lansasha Herrera, CRNP      magnesium hydroxide  30 mL Oral Daily PRN LesTrivoli Frias, DO      metoprolol tartrate  25 mg Oral Q12H [facility] Sarah Neil III, DO      nicotine  1 patch Transdermal Daily PRN Lansasha Herrera CRNP      OLANZapine  30 mg Oral HS MURTAZA Conklin      ondansetron  4 mg Oral Q6H PRN Kael Altaf III, DO      pantoprazole  40 mg Oral BID AC Brad Starks MD      polyethylene glycol  17 g Oral Daily hSerry Villatoro PA-C      polyethylene glycol  17 g Oral BID PRN MURTAZA Conklin      propranolol  10 mg Oral Q8H PRN MURTAZA Conklin      [START ON 9/21/2022] QUEtiapine  100 mg Oral HS Brad Starks MD      QUEtiapine  200 mg Oral HS Brad Starks MD      sitaGLIPtin  100 mg Oral Daily Macel Altaf III, DO      topiramate  25 mg Oral BID Brad Starks MD      white petrolatum-mineral oil  1 application Topical TID PRN Michael Salazar DO         Counseling / Coordination of Care: Total floor / unit time spent today 15 minutes  Greater than 50% of total time was spent with the patient and / or family counseling and / or somewhat receptive to supportive listening and teaching positive coping skills to deal with symptom mangement  Patient's Rights, confidentiality and exceptions to confidentiality, use of automated medical record, [facility] staff access to medical record, and consent to treatment reviewed  This note has been dictated and hence there may be problems with syntax, grammar and spelling and please contact Dr David Prescott directly with any problems   - - -

## 2022-11-02 NOTE — H&P ADULT - PMH
AA (alcohol abuse)    Detached retina, right  Right eye blind  DM (diabetes mellitus)    DM (diabetes mellitus), type 2    H/O: HTN (hypertension)    HLD (hyperlipidemia)    HTN (hypertension)    Seizure  secondary to alcohol abuse  Tracheal stenosis No

## 2022-12-15 NOTE — H&P ADULT. - PRESSURE ULCER(S)
Problem: End-of-Life Care  Goal: Comfort, Peace and Preserved Dignity  Intervention: Promote Peace and Maintain Dignity  Recent Flowsheet Documentation  Taken 12/14/2022 1617 by Lauren Preston RN  Complementary Therapy:   aromatherapy utilized   essential oils utilized   massage therapy performed   Goal Outcome Evaluation:      Plan of Care Reviewed With: patient      Patient had lots of visitors all day. Had scheduled ativan, still disoriented to time,place and situation. Denies pain, appetite good.            no

## 2023-05-23 NOTE — ED ADULT NURSE NOTE - CHIEF COMPLAINT
The patient is a 56y Male complaining of altered mental status. Additional Notes: Recheck at follow up Render Risk Assessment In Note?: no Detail Level: Zone

## 2023-10-04 NOTE — ED PROVIDER NOTE - UNABLE TO OBTAIN
Urgent need for Intervention patient is altered, unable to provide a history or cooperate with physical exam. No

## 2024-10-23 NOTE — ED ADULT NURSE REASSESSMENT NOTE - NS ED NURSE REASSESS COMMENT FT1
Expiratory wheezing noted, placed on continuous o2 sat monitoring and duoneb initiated. Patient reports hx COPD, asthma. "Rock"

## 2025-06-20 NOTE — PATIENT PROFILE ADULT. - COMFORT LEVEL, ACCEPTABLE
Appears there is small area of abrasion to your conjunctiva. Use erythromycin ointment for this. You can take zyrtec for allergic symptoms, eye itching. Referral placed for eye doctor, follow up.     Return to ER with new or worsening symptoms.   
1